# Patient Record
Sex: FEMALE | Race: WHITE | NOT HISPANIC OR LATINO | Employment: OTHER | URBAN - METROPOLITAN AREA
[De-identification: names, ages, dates, MRNs, and addresses within clinical notes are randomized per-mention and may not be internally consistent; named-entity substitution may affect disease eponyms.]

---

## 2017-01-23 ENCOUNTER — CONVERSION ENCOUNTER (OUTPATIENT)
Dept: FAMILY MEDICINE CLINIC | Facility: CLINIC | Age: 82
End: 2017-01-23

## 2017-01-23 ENCOUNTER — HOSPITAL ENCOUNTER (OUTPATIENT)
Dept: FAMILY MEDICINE CLINIC | Facility: CLINIC | Age: 82
Setting detail: SPECIMEN
Discharge: HOME | End: 2017-01-23
Attending: FAMILY MEDICINE | Admitting: FAMILY MEDICINE

## 2017-01-23 LAB
ALBUMIN SERPL-MCNC: 3.9 G/DL (ref 3.5–4.8)
ALBUMIN/GLOB SERPL: 1.3 {RATIO} (ref 1–1.7)
ALP SERPL-CCNC: 31 IU/L (ref 32–91)
ALT SERPL-CCNC: 13 IU/L (ref 14–54)
ANION GAP SERPL CALC-SCNC: 9.6 MMOL/L (ref 10–20)
AST SERPL-CCNC: 19 IU/L (ref 15–41)
BILIRUB SERPL-MCNC: 0.9 MG/DL (ref 0.3–1.2)
BUN SERPL-MCNC: 23 MG/DL (ref 8–20)
BUN/CREAT SERPL: 20.9 (ref 5.4–26.2)
CALCIUM SERPL-MCNC: 9.9 MG/DL (ref 8.9–10.3)
CHLORIDE SERPL-SCNC: 106 MMOL/L (ref 101–111)
CHOLEST SERPL-MCNC: 175 MG/DL
CHOLEST/HDLC SERPL: 3.3 {RATIO}
CONV CO2: 29 MMOL/L (ref 22–32)
CONV LDL CHOLESTEROL DIRECT: 96 MG/DL (ref 0–100)
CONV TOTAL PROTEIN: 6.9 G/DL (ref 6.1–7.9)
CREAT UR-MCNC: 1.1 MG/DL (ref 0.4–1)
GLOBULIN UR ELPH-MCNC: 3 G/DL (ref 2.5–3.8)
GLUCOSE SERPL-MCNC: 105 MG/DL (ref 65–99)
HDLC SERPL-MCNC: 53 MG/DL
LDLC/HDLC SERPL: 1.8 {RATIO}
LIPID INTERPRETATION: ABNORMAL
POTASSIUM SERPL-SCNC: 3.6 MMOL/L (ref 3.6–5.1)
SODIUM SERPL-SCNC: 141 MMOL/L (ref 136–144)
TRIGL SERPL-MCNC: 122 MG/DL
TSH SERPL-ACNC: 0.75 UIU/ML (ref 0.34–5.6)
VLDLC SERPL CALC-MCNC: 26 MG/DL

## 2017-07-21 ENCOUNTER — HOSPITAL ENCOUNTER (OUTPATIENT)
Dept: FAMILY MEDICINE CLINIC | Facility: CLINIC | Age: 82
Setting detail: SPECIMEN
Discharge: HOME | End: 2017-07-21
Attending: FAMILY MEDICINE | Admitting: FAMILY MEDICINE

## 2017-07-21 ENCOUNTER — CONVERSION ENCOUNTER (OUTPATIENT)
Dept: FAMILY MEDICINE CLINIC | Facility: CLINIC | Age: 82
End: 2017-07-21

## 2017-07-21 LAB
ALBUMIN SERPL-MCNC: 3.7 G/DL (ref 3.5–4.8)
ALBUMIN/GLOB SERPL: 1.2 {RATIO} (ref 1–1.7)
ALP SERPL-CCNC: 32 IU/L (ref 32–91)
ALT SERPL-CCNC: 13 IU/L (ref 14–54)
ANION GAP SERPL CALC-SCNC: 13.2 MMOL/L (ref 10–20)
AST SERPL-CCNC: 16 IU/L (ref 15–41)
BILIRUB SERPL-MCNC: 0.8 MG/DL (ref 0.3–1.2)
BUN SERPL-MCNC: 19 MG/DL (ref 8–20)
BUN/CREAT SERPL: 15.8 (ref 5.4–26.2)
CALCIUM SERPL-MCNC: 10.1 MG/DL (ref 8.9–10.3)
CHLORIDE SERPL-SCNC: 104 MMOL/L (ref 101–111)
CHOLEST SERPL-MCNC: 166 MG/DL
CHOLEST/HDLC SERPL: 3.1 {RATIO}
CONV CO2: 27 MMOL/L (ref 22–32)
CONV LDL CHOLESTEROL DIRECT: 93 MG/DL (ref 0–100)
CONV TOTAL PROTEIN: 6.9 G/DL (ref 6.1–7.9)
CREAT UR-MCNC: 1.2 MG/DL (ref 0.4–1)
GLOBULIN UR ELPH-MCNC: 3.2 G/DL (ref 2.5–3.8)
GLUCOSE SERPL-MCNC: 108 MG/DL (ref 65–99)
HDLC SERPL-MCNC: 54 MG/DL
LDLC/HDLC SERPL: 1.7 {RATIO}
LIPID INTERPRETATION: NORMAL
POTASSIUM SERPL-SCNC: 4.2 MMOL/L (ref 3.6–5.1)
SODIUM SERPL-SCNC: 140 MMOL/L (ref 136–144)
TRIGL SERPL-MCNC: 92 MG/DL
TSH SERPL-ACNC: 0.77 UIU/ML (ref 0.34–5.6)
VLDLC SERPL CALC-MCNC: 18.9 MG/DL

## 2017-08-11 ENCOUNTER — HOSPITAL ENCOUNTER (OUTPATIENT)
Dept: MAMMOGRAPHY | Facility: HOSPITAL | Age: 82
Discharge: HOME | End: 2017-08-11
Attending: FAMILY MEDICINE | Admitting: FAMILY MEDICINE

## 2017-08-17 ENCOUNTER — HOSPITAL ENCOUNTER (OUTPATIENT)
Dept: MAMMOGRAPHY | Facility: HOSPITAL | Age: 82
Discharge: HOME | End: 2017-08-17
Attending: FAMILY MEDICINE | Admitting: FAMILY MEDICINE

## 2017-08-21 ENCOUNTER — CONVERSION ENCOUNTER (OUTPATIENT)
Dept: FAMILY MEDICINE CLINIC | Facility: CLINIC | Age: 82
End: 2017-08-21

## 2017-08-29 ENCOUNTER — CONVERSION ENCOUNTER (OUTPATIENT)
Dept: FAMILY MEDICINE CLINIC | Facility: CLINIC | Age: 82
End: 2017-08-29

## 2017-09-13 ENCOUNTER — HOSPITAL ENCOUNTER (OUTPATIENT)
Dept: PREADMISSION TESTING | Facility: HOSPITAL | Age: 82
Discharge: HOME | End: 2017-09-13
Attending: SURGERY | Admitting: SURGERY

## 2017-10-03 ENCOUNTER — CONVERSION ENCOUNTER (OUTPATIENT)
Dept: FAMILY MEDICINE CLINIC | Facility: CLINIC | Age: 82
End: 2017-10-03

## 2017-10-12 ENCOUNTER — CONVERSION ENCOUNTER (OUTPATIENT)
Dept: FAMILY MEDICINE CLINIC | Facility: CLINIC | Age: 82
End: 2017-10-12

## 2017-10-19 ENCOUNTER — CONVERSION ENCOUNTER (OUTPATIENT)
Dept: FAMILY MEDICINE CLINIC | Facility: CLINIC | Age: 82
End: 2017-10-19

## 2017-10-26 ENCOUNTER — HOSPITAL ENCOUNTER (OUTPATIENT)
Dept: ONCOLOGY | Facility: CLINIC | Age: 82
Setting detail: INFUSION SERIES
Discharge: HOME | End: 2017-10-26
Attending: INTERNAL MEDICINE | Admitting: INTERNAL MEDICINE

## 2017-10-26 ENCOUNTER — HOSPITAL ENCOUNTER (OUTPATIENT)
Dept: ONCOLOGY | Facility: HOSPITAL | Age: 82
Discharge: HOME | End: 2017-10-26
Attending: INTERNAL MEDICINE | Admitting: INTERNAL MEDICINE

## 2017-10-26 ENCOUNTER — CLINICAL SUPPORT (OUTPATIENT)
Dept: ONCOLOGY | Facility: HOSPITAL | Age: 82
End: 2017-10-26

## 2017-10-26 NOTE — PROGRESS NOTES
PATIENTS ONCOLOGY RECORD LOCATED IN [facility]      Subjective     Name:  ABDON ROSS     Date:  10/26/2017  Address:  [address] [id number]  Home: [phone]  :  1935 AGE:  82 y.o.        RECORDS OBTAINED:  Patients Oncology Record is located in [facility]

## 2017-11-02 ENCOUNTER — CONVERSION ENCOUNTER (OUTPATIENT)
Dept: FAMILY MEDICINE CLINIC | Facility: CLINIC | Age: 82
End: 2017-11-02

## 2017-12-14 ENCOUNTER — CONVERSION ENCOUNTER (OUTPATIENT)
Dept: FAMILY MEDICINE CLINIC | Facility: CLINIC | Age: 82
End: 2017-12-14

## 2018-01-22 ENCOUNTER — HOSPITAL ENCOUNTER (OUTPATIENT)
Dept: FAMILY MEDICINE CLINIC | Facility: CLINIC | Age: 83
Setting detail: SPECIMEN
Discharge: HOME | End: 2018-01-22
Attending: FAMILY MEDICINE | Admitting: FAMILY MEDICINE

## 2018-01-22 ENCOUNTER — CONVERSION ENCOUNTER (OUTPATIENT)
Dept: FAMILY MEDICINE CLINIC | Facility: CLINIC | Age: 83
End: 2018-01-22

## 2018-01-22 LAB
ALBUMIN SERPL-MCNC: 3.7 G/DL (ref 3.5–4.8)
ALBUMIN/GLOB SERPL: 1.2 {RATIO} (ref 1–1.7)
ALP SERPL-CCNC: 33 IU/L (ref 32–91)
ALT SERPL-CCNC: 13 IU/L (ref 14–54)
ANION GAP SERPL CALC-SCNC: 12.1 MMOL/L (ref 10–20)
AST SERPL-CCNC: 19 IU/L (ref 15–41)
BILIRUB SERPL-MCNC: 0.7 MG/DL (ref 0.3–1.2)
BUN SERPL-MCNC: 20 MG/DL (ref 8–20)
BUN/CREAT SERPL: 18.2 (ref 5.4–26.2)
CALCIUM SERPL-MCNC: 10.2 MG/DL (ref 8.9–10.3)
CHLORIDE SERPL-SCNC: 102 MMOL/L (ref 101–111)
CHOLEST SERPL-MCNC: 176 MG/DL
CHOLEST/HDLC SERPL: 3.2 {RATIO}
CONV CO2: 28 MMOL/L (ref 22–32)
CONV LDL CHOLESTEROL DIRECT: 105 MG/DL (ref 0–100)
CONV TOTAL PROTEIN: 6.8 G/DL (ref 6.1–7.9)
CREAT UR-MCNC: 1.1 MG/DL (ref 0.4–1)
GLOBULIN UR ELPH-MCNC: 3.1 G/DL (ref 2.5–3.8)
GLUCOSE SERPL-MCNC: 98 MG/DL (ref 65–99)
HDLC SERPL-MCNC: 54 MG/DL
LDLC/HDLC SERPL: 1.9 {RATIO}
LIPID INTERPRETATION: ABNORMAL
POTASSIUM SERPL-SCNC: 4.1 MMOL/L (ref 3.6–5.1)
SODIUM SERPL-SCNC: 138 MMOL/L (ref 136–144)
TRIGL SERPL-MCNC: 86 MG/DL
VLDLC SERPL CALC-MCNC: 17 MG/DL

## 2018-04-26 ENCOUNTER — CLINICAL SUPPORT (OUTPATIENT)
Dept: ONCOLOGY | Facility: HOSPITAL | Age: 83
End: 2018-04-26

## 2018-04-26 ENCOUNTER — HOSPITAL ENCOUNTER (OUTPATIENT)
Dept: ONCOLOGY | Facility: CLINIC | Age: 83
Setting detail: INFUSION SERIES
Discharge: HOME | End: 2018-04-26
Attending: INTERNAL MEDICINE | Admitting: INTERNAL MEDICINE

## 2018-04-26 NOTE — PROGRESS NOTES
PATIENTS ONCOLOGY RECORD LOCATED IN [facility]      Subjective     Name:  ABDON ROSS     Date:  2018  Address:  [address] [id number]  Home: [phone]  :  1935 AGE:  82 y.o.        RECORDS OBTAINED:  Patients Oncology Record is located in [facility]

## 2018-07-20 ENCOUNTER — HOSPITAL ENCOUNTER (OUTPATIENT)
Dept: FAMILY MEDICINE CLINIC | Facility: CLINIC | Age: 83
Setting detail: SPECIMEN
Discharge: HOME | End: 2018-07-20
Attending: FAMILY MEDICINE | Admitting: FAMILY MEDICINE

## 2018-07-20 ENCOUNTER — CONVERSION ENCOUNTER (OUTPATIENT)
Dept: FAMILY MEDICINE CLINIC | Facility: CLINIC | Age: 83
End: 2018-07-20

## 2018-07-20 LAB
ALBUMIN SERPL-MCNC: 3.9 G/DL (ref 3.5–4.8)
ALBUMIN/GLOB SERPL: 1.3 {RATIO} (ref 1–1.7)
ALP SERPL-CCNC: 32 IU/L (ref 32–91)
ALT SERPL-CCNC: 14 IU/L (ref 14–54)
ANION GAP SERPL CALC-SCNC: 11.6 MMOL/L (ref 10–20)
AST SERPL-CCNC: 17 IU/L (ref 15–41)
BASOPHILS # BLD AUTO: 0 10*3/UL (ref 0–0.2)
BASOPHILS NFR BLD AUTO: 1 % (ref 0–2)
BILIRUB SERPL-MCNC: 0.7 MG/DL (ref 0.3–1.2)
BUN SERPL-MCNC: 15 MG/DL (ref 8–20)
BUN/CREAT SERPL: 13.6 (ref 5.4–26.2)
CALCIUM SERPL-MCNC: 10.3 MG/DL (ref 8.9–10.3)
CHLORIDE SERPL-SCNC: 102 MMOL/L (ref 101–111)
CHOLEST SERPL-MCNC: 160 MG/DL
CHOLEST/HDLC SERPL: 3.1 {RATIO}
CONV CO2: 28 MMOL/L (ref 22–32)
CONV LDL CHOLESTEROL DIRECT: 82 MG/DL (ref 0–100)
CONV TOTAL PROTEIN: 6.9 G/DL (ref 6.1–7.9)
CREAT UR-MCNC: 1.1 MG/DL (ref 0.4–1)
DIFFERENTIAL METHOD BLD: (no result)
EOSINOPHIL # BLD AUTO: 0.2 10*3/UL (ref 0–0.3)
EOSINOPHIL # BLD AUTO: 2 % (ref 0–3)
ERYTHROCYTE [DISTWIDTH] IN BLOOD BY AUTOMATED COUNT: 12.9 % (ref 11.5–14.5)
GLOBULIN UR ELPH-MCNC: 3 G/DL (ref 2.5–3.8)
GLUCOSE SERPL-MCNC: 85 MG/DL (ref 65–99)
HCT VFR BLD AUTO: 38.2 % (ref 35–49)
HDLC SERPL-MCNC: 52 MG/DL
HGB BLD-MCNC: 13.2 G/DL (ref 12–15)
LDLC/HDLC SERPL: 1.6 {RATIO}
LIPID INTERPRETATION: ABNORMAL
LYMPHOCYTES # BLD AUTO: 2.6 10*3/UL (ref 0.8–4.8)
LYMPHOCYTES NFR BLD AUTO: 37 % (ref 18–42)
MCH RBC QN AUTO: 32.3 PG (ref 26–32)
MCHC RBC AUTO-ENTMCNC: 34.5 G/DL (ref 32–36)
MCV RBC AUTO: 93.6 FL (ref 80–94)
MONOCYTES # BLD AUTO: 0.6 10*3/UL (ref 0.1–1.3)
MONOCYTES NFR BLD AUTO: 8 % (ref 2–11)
NEUTROPHILS # BLD AUTO: 3.8 10*3/UL (ref 2.3–8.6)
NEUTROPHILS NFR BLD AUTO: 52 % (ref 50–75)
NRBC BLD AUTO-RTO: 0 /100{WBCS}
NRBC/RBC NFR BLD MANUAL: 0 10*3/UL
PLATELET # BLD AUTO: 178 10*3/UL (ref 150–450)
PMV BLD AUTO: 12.1 FL (ref 7.4–10.4)
POTASSIUM SERPL-SCNC: 3.6 MMOL/L (ref 3.6–5.1)
RBC # BLD AUTO: 4.09 10*6/UL (ref 4–5.4)
SODIUM SERPL-SCNC: 138 MMOL/L (ref 136–144)
TRIGL SERPL-MCNC: 122 MG/DL
VLDLC SERPL CALC-MCNC: 26.6 MG/DL
WBC # BLD AUTO: 7.2 10*3/UL (ref 4.5–11.5)

## 2018-08-30 ENCOUNTER — CONVERSION ENCOUNTER (OUTPATIENT)
Dept: FAMILY MEDICINE CLINIC | Facility: CLINIC | Age: 83
End: 2018-08-30

## 2018-10-26 ENCOUNTER — CONVERSION ENCOUNTER (OUTPATIENT)
Dept: FAMILY MEDICINE CLINIC | Facility: CLINIC | Age: 83
End: 2018-10-26

## 2019-01-18 ENCOUNTER — HOSPITAL ENCOUNTER (OUTPATIENT)
Dept: FAMILY MEDICINE CLINIC | Facility: CLINIC | Age: 84
Setting detail: SPECIMEN
Discharge: HOME | End: 2019-01-18
Attending: FAMILY MEDICINE | Admitting: FAMILY MEDICINE

## 2019-01-18 ENCOUNTER — CONVERSION ENCOUNTER (OUTPATIENT)
Dept: FAMILY MEDICINE CLINIC | Facility: CLINIC | Age: 84
End: 2019-01-18

## 2019-01-18 LAB
ALBUMIN SERPL-MCNC: 3.5 G/DL (ref 3.5–4.8)
ALBUMIN/GLOB SERPL: 1.3 {RATIO} (ref 1–1.7)
ALP SERPL-CCNC: 30 IU/L (ref 32–91)
ALT SERPL-CCNC: 13 IU/L (ref 14–54)
ANION GAP SERPL CALC-SCNC: 12.4 MMOL/L (ref 10–20)
AST SERPL-CCNC: 16 IU/L (ref 15–41)
BASOPHILS # BLD AUTO: 0 10*3/UL (ref 0–0.2)
BASOPHILS NFR BLD AUTO: 0 % (ref 0–2)
BILIRUB SERPL-MCNC: 0.7 MG/DL (ref 0.3–1.2)
BUN SERPL-MCNC: 23 MG/DL (ref 8–20)
BUN/CREAT SERPL: 20.9 (ref 5.4–26.2)
CALCIUM SERPL-MCNC: 9.9 MG/DL (ref 8.9–10.3)
CHLORIDE SERPL-SCNC: 104 MMOL/L (ref 101–111)
CHOLEST SERPL-MCNC: 157 MG/DL
CHOLEST/HDLC SERPL: 2.9 {RATIO}
CONV CO2: 25 MMOL/L (ref 22–32)
CONV LDL CHOLESTEROL DIRECT: 83 MG/DL (ref 0–100)
CONV TOTAL PROTEIN: 6.3 G/DL (ref 6.1–7.9)
CREAT UR-MCNC: 1.1 MG/DL (ref 0.4–1)
DIFFERENTIAL METHOD BLD: (no result)
EOSINOPHIL # BLD AUTO: 0.1 10*3/UL (ref 0–0.3)
EOSINOPHIL # BLD AUTO: 2 % (ref 0–3)
ERYTHROCYTE [DISTWIDTH] IN BLOOD BY AUTOMATED COUNT: 13.2 % (ref 11.5–14.5)
GLOBULIN UR ELPH-MCNC: 2.8 G/DL (ref 2.5–3.8)
GLUCOSE SERPL-MCNC: 96 MG/DL (ref 65–99)
HCT VFR BLD AUTO: 36.5 % (ref 35–49)
HDLC SERPL-MCNC: 54 MG/DL
HGB BLD-MCNC: 12.2 G/DL (ref 12–15)
LDLC/HDLC SERPL: 1.5 {RATIO}
LIPID INTERPRETATION: NORMAL
LYMPHOCYTES # BLD AUTO: 2.6 10*3/UL (ref 0.8–4.8)
LYMPHOCYTES NFR BLD AUTO: 36 % (ref 18–42)
MCH RBC QN AUTO: 32.1 PG (ref 26–32)
MCHC RBC AUTO-ENTMCNC: 33.4 G/DL (ref 32–36)
MCV RBC AUTO: 96.1 FL (ref 80–94)
MONOCYTES # BLD AUTO: 0.6 10*3/UL (ref 0.1–1.3)
MONOCYTES NFR BLD AUTO: 8 % (ref 2–11)
NEUTROPHILS # BLD AUTO: 3.8 10*3/UL (ref 2.3–8.6)
NEUTROPHILS NFR BLD AUTO: 54 % (ref 50–75)
NRBC BLD AUTO-RTO: 0 /100{WBCS}
NRBC/RBC NFR BLD MANUAL: 0 10*3/UL
PLATELET # BLD AUTO: 173 10*3/UL (ref 150–450)
PMV BLD AUTO: 12.1 FL (ref 7.4–10.4)
POTASSIUM SERPL-SCNC: 3.4 MMOL/L (ref 3.6–5.1)
RBC # BLD AUTO: 3.8 10*6/UL (ref 4–5.4)
SODIUM SERPL-SCNC: 138 MMOL/L (ref 136–144)
TRIGL SERPL-MCNC: 88 MG/DL
VLDLC SERPL CALC-MCNC: 19.8 MG/DL
WBC # BLD AUTO: 7.1 10*3/UL (ref 4.5–11.5)

## 2019-03-12 ENCOUNTER — HOSPITAL ENCOUNTER (OUTPATIENT)
Dept: MAMMOGRAPHY | Facility: HOSPITAL | Age: 84
Discharge: HOME | End: 2019-03-12
Attending: FAMILY MEDICINE | Admitting: FAMILY MEDICINE

## 2019-05-03 ENCOUNTER — CONVERSION ENCOUNTER (OUTPATIENT)
Dept: FAMILY MEDICINE CLINIC | Facility: CLINIC | Age: 84
End: 2019-05-03

## 2019-06-04 VITALS
HEIGHT: 63 IN | BODY MASS INDEX: 31.08 KG/M2 | SYSTOLIC BLOOD PRESSURE: 135 MMHG | WEIGHT: 184 LBS | WEIGHT: 184 LBS | RESPIRATION RATE: 16 BRPM | BODY MASS INDEX: 31.25 KG/M2 | HEART RATE: 63 BPM | OXYGEN SATURATION: 98 % | DIASTOLIC BLOOD PRESSURE: 84 MMHG | SYSTOLIC BLOOD PRESSURE: 147 MMHG | WEIGHT: 176.4 LBS | HEART RATE: 55 BPM | WEIGHT: 178 LBS | DIASTOLIC BLOOD PRESSURE: 65 MMHG | HEIGHT: 63 IN | OXYGEN SATURATION: 96 % | HEIGHT: 63 IN | HEART RATE: 58 BPM | WEIGHT: 183 LBS | HEART RATE: 58 BPM | BODY MASS INDEX: 32.6 KG/M2 | RESPIRATION RATE: 20 BRPM | HEIGHT: 63 IN | BODY MASS INDEX: 31.71 KG/M2 | OXYGEN SATURATION: 98 % | OXYGEN SATURATION: 98 % | DIASTOLIC BLOOD PRESSURE: 77 MMHG | OXYGEN SATURATION: 99 % | RESPIRATION RATE: 20 BRPM | BODY MASS INDEX: 31.54 KG/M2 | WEIGHT: 176 LBS | BODY MASS INDEX: 31.54 KG/M2 | HEART RATE: 64 BPM | OXYGEN SATURATION: 95 % | OXYGEN SATURATION: 97 % | BODY MASS INDEX: 32.16 KG/M2 | DIASTOLIC BLOOD PRESSURE: 80 MMHG | WEIGHT: 184 LBS | OXYGEN SATURATION: 97 % | SYSTOLIC BLOOD PRESSURE: 133 MMHG | HEART RATE: 56 BPM | WEIGHT: 175.8 LBS | BODY MASS INDEX: 31.15 KG/M2 | HEIGHT: 63 IN | SYSTOLIC BLOOD PRESSURE: 151 MMHG | WEIGHT: 184 LBS | HEIGHT: 63 IN | DIASTOLIC BLOOD PRESSURE: 85 MMHG | OXYGEN SATURATION: 97 % | SYSTOLIC BLOOD PRESSURE: 127 MMHG | HEIGHT: 63 IN | SYSTOLIC BLOOD PRESSURE: 135 MMHG | SYSTOLIC BLOOD PRESSURE: 119 MMHG | HEART RATE: 52 BPM | SYSTOLIC BLOOD PRESSURE: 131 MMHG | BODY MASS INDEX: 31.43 KG/M2 | HEART RATE: 60 BPM | DIASTOLIC BLOOD PRESSURE: 76 MMHG | SYSTOLIC BLOOD PRESSURE: 131 MMHG | OXYGEN SATURATION: 96 % | HEIGHT: 63 IN | WEIGHT: 175.4 LBS | OXYGEN SATURATION: 95 % | HEART RATE: 58 BPM | WEIGHT: 179 LBS | HEIGHT: 63 IN | SYSTOLIC BLOOD PRESSURE: 130 MMHG | DIASTOLIC BLOOD PRESSURE: 74 MMHG | WEIGHT: 177.4 LBS | HEART RATE: 72 BPM | WEIGHT: 178 LBS | BODY MASS INDEX: 31.18 KG/M2 | DIASTOLIC BLOOD PRESSURE: 79 MMHG | DIASTOLIC BLOOD PRESSURE: 77 MMHG | RESPIRATION RATE: 20 BRPM | SYSTOLIC BLOOD PRESSURE: 110 MMHG | HEART RATE: 54 BPM | HEIGHT: 63 IN | DIASTOLIC BLOOD PRESSURE: 80 MMHG | RESPIRATION RATE: 14 BRPM | WEIGHT: 181.5 LBS | DIASTOLIC BLOOD PRESSURE: 72 MMHG | SYSTOLIC BLOOD PRESSURE: 166 MMHG | HEART RATE: 64 BPM | DIASTOLIC BLOOD PRESSURE: 78 MMHG | SYSTOLIC BLOOD PRESSURE: 126 MMHG | DIASTOLIC BLOOD PRESSURE: 67 MMHG | HEART RATE: 61 BPM | OXYGEN SATURATION: 98 % | OXYGEN SATURATION: 100 %

## 2019-06-04 VITALS
SYSTOLIC BLOOD PRESSURE: 172 MMHG | HEART RATE: 62 BPM | WEIGHT: 176 LBS | DIASTOLIC BLOOD PRESSURE: 85 MMHG | BODY MASS INDEX: 31.18 KG/M2 | HEIGHT: 63 IN | OXYGEN SATURATION: 96 %

## 2019-06-06 NOTE — PROGRESS NOTES
Visit Type:  Follow-up Visit  Referring Provider:  Matt Arana MD  Primary Provider:  Sumit Gutierrez MD    CC:  f/u from [facility] and dizziness.    History of Present Illness:         This is an 83 years old female who presents with ER f/u on dizziness.  The patient seen at [facility] ER 1 week ago with dizziness and  spinning sensation, but has no history of fullness in ear, imbalance and fainting spells.  The episodes of dizziness occur   isolated event.  When the episodes occur, they last for a few minutes.  The episodes are brought on or made worse with standing up and looking up.  nausea, vomiting, unsteady gate, falls, headaches and vision changes.        Past Medical History:     Reviewed history from 2015 and no changes required:        hemorrhoids        varicose veins        sinus        breast lumps        Hyperlipidemia        Hypertension        Hypothyroidism        Measles        Mumps        Chickenpox        Whooping Cough    Family History Summary:      Reviewed history Last on 2019 and no changes required:2019  Other Family Member - Has FH Hypertension - Entered On: 2016  Other Family Member - Has FH Heart Disease - Entered On: 2016    General Comments - FH:  father AMI @ 67 years old  mother 92   brother ruptured anuerism  FH for AAA  Family History of Hypertension  Family History of Diabetes      Social History:     Reviewed history from 2017 and no changes required:        Patient has never smoked.        Passive Smoke: N        Alcohol Use: N        Drug Use: N        HIV/High Risk: N        Regular Exercise: N                Risk Factors:     Smoked Tobacco Use:  Never smoker      Review of Systems     General       Complains of fatigue and malaise.    CV       Complains of lightheadedness.       Denies near fainting, shortness of breath with exertion and palpitations.    Resp       Denies shortness of breath.    GI       Denies nausea, vomiting and  abdominal pain.    Neuro       Complains of sensation of room spinning and dizziness.       Denies poor balance, falling down, visual disturbances and seizures.    Psych       Complains of anxiety.      Vital Signs:    Patient Profile:    83 Years Old Female  Height:     63 inches  Weight:     176 pounds  BMI:        31.17     O2 Sat:     96 %  Temp:       97.2 degrees F oral  Pulse rate: 62 / minute  BP Sittin / 85  (left arm)    Cuff size:  regular      Problems: Active problems were reviewed with the patient during this visit.  Medications: Medications were reviewed with the patient during this visit.  Allergies: Allergies were reviewed with the patient during this visit.        Vitals Entered By: Deborah TREVIZO (May  3, 2019 12:46 PM)      Physical Exam    General:      well developed, well nourished, in no acute distress.    Eyes:      PERRL/EOM intact, conjunctiva and sclera clear with out nystagmus.    Ears:      TM's intact and clear with normal canals with grossly normal hearing.    Lungs:      clear bilaterally to auscultation.    Heart:      non-displaced PMI, chest non-tender; regular rate and rhythm, S1, S2 without murmurs, rubs, or gallops  Abdomen:      non-tender.    Neurologic:      no focal deficits, cranial nerves II-XII grossly intact with normal sensation, reflexes, coordination, muscle strength and tone.    Psych:      alert and cooperative; normal mood and affect; normal attention span and concentration.        Blood Pressure:  Today's BP: 172/85 mm Hg    Labwork:   Most Recent Lab Results:   LDL: 83 mg/dL 2019    Active Medications (reviewed today):  BENZONATATE 200 MG ORAL CAPSULE (BENZONATATE) Take 1 capsule three times daily as needed for cough  ZITHROMAX 250 MG ORAL TABLET (AZITHROMYCIN) Take 2 tablets by mouth for 1 day, then Take 1 tablet by mouth daily for 4 days  TYLENOL CAPSULE (ACETAMINOPHEN CAPS)   FISH OIL CAPSULE (OMEGA-3 FATTY ACIDS CAPS)   GLUCOSAMINE CAPSULE  (GLUCOSAMINE SULFATE CAPS)   POTASSIUM CHLORIDE ER 20 MEQ TABLET EXTENDED RELEASE (POTASSIUM CHLORIDE DEONDRE ER) TAKE 1 TABLET EVERY DAY  FOLIC ACID 400 MCG ORAL TABLET (FOLIC ACID) daily  PRAVASTATIN SODIUM 40 MG ORAL TABLET (PRAVASTATIN SODIUM) TAKE 1 TABLET EVERY DAY  HYDROCHLOROTHIAZIDE 25 MG TABLET (HYDROCHLOROTHIAZIDE) TAKE 1 TABLET EVERY DAY  LOSARTAN POTASSIUM 50 MG ORAL TABLET (LOSARTAN POTASSIUM) TAKE 1 TABLET EVERY DAY  SYNTHROID 100 MCG TABLET (LEVOTHYROXINE SODIUM) TAKE 1 TABLET EVERY DAY    Current Allergies (reviewed today):  CODEINE SULFATE (Critical)        Impression & Recommendations:    Problem # 1:  Dizziness (ICD-780.4) ([id number])  Assessment: New  ER reord reveiwed, Sx improving. Demonstrated maneuvers to self-treat vertigo.   Patient to call to be seen if no improvement in 10-14 days, sooner if worse.     Medications Added to Medication List This Visit:  1)  Losartan Potassium-hctz 100-12.5 Mg Oral Tablet (Losartan potassium-hctz) .... Take one (1) tablet by mouth daily.        Patient Instructions:  1)  Please schedule a follow-up appointment prn.          Medications:  LOSARTAN POTASSIUM-HCTZ 100-12.5 MG ORAL TABLET (LOSARTAN POTASSIUM-HCTZ) Take one (1) tablet by mouth daily.  #30[Tablet] x 3      Entered and Authorized by:  Matt Arana MD      Electronically signed by:   Matt Arana MD on 05/03/2019      Method used:    Electronically to               [id number]* (retail)              [address] [place] [address]              Ph: [phone]              Fax: [phone]      Note to Pharmacy: Route: ORAL;please note change in Losartan and HCTZ  dose                           RxID:   [id number]                Medication Administration    Orders Added:  1)  Ofc Vst, Est Level III [67633]  ]      Electronically signed by Matt Arana MD on 05/27/2019 at 12:29  PM  ________________________________________________________________________       Disclaimer: Converted Note message may not contain all data elements that existed in the legacy source system. Please see [facility] for the original note details.

## 2019-06-14 ENCOUNTER — CONVERSION ENCOUNTER (OUTPATIENT)
Dept: FAMILY MEDICINE CLINIC | Facility: CLINIC | Age: 84
End: 2019-06-14

## 2019-06-17 ENCOUNTER — TELEPHONE (OUTPATIENT)
Dept: FAMILY MEDICINE CLINIC | Facility: CLINIC | Age: 84
End: 2019-06-17

## 2019-06-26 VITALS
BODY MASS INDEX: 31.18 KG/M2 | HEART RATE: 61 BPM | HEIGHT: 63 IN | OXYGEN SATURATION: 98 % | WEIGHT: 176 LBS | DIASTOLIC BLOOD PRESSURE: 84 MMHG | SYSTOLIC BLOOD PRESSURE: 145 MMHG

## 2019-06-27 NOTE — PROGRESS NOTES
Visit Type:  Follow-up Visit  Referring Provider:  Matt Arana MD  Primary Provider:  Sumit Gutierrez MD    CC:  f/u on dizziness and HTN.    History of Present Illness:         This is an 83 years old female who presents with hypertension and hypothyroidism f/u.  The patient complains of fatigue, but denies headache, visual disturbance, dizziness, syncope, palpitations, abdominal pain and chest pain.  The patient understands   dietary restrictions and is compliant with medications.        Past Medical History:     Reviewed history from 2015 and no changes required:        hemorrhoids        varicose veins        sinus        breast lumps        Hyperlipidemia        Hypertension        Hypothyroidism        Measles        Mumps        Chickenpox        Whooping Cough        Social History:     Reviewed history from 2017 and no changes required:        Patient has never smoked.        Passive Smoke: N        Alcohol Use: N        Drug Use: N        HIV/High Risk: N        Regular Exercise: N                Risk Factors:     Smoked Tobacco Use:  Never smoker      Review of Systems     General       Complains of fatigue.       Denies sleep disorder.    CV       Denies chest pain or discomfort.    Resp       Denies shortness of breath and wheezing.    GI       Denies abdominal pain.    Neuro       Denies headaches and dizziness.      Vital Signs:    Patient Profile:    83 Years Old Female  Height:     63 inches  Weight:     176 pounds  BMI:        31.17     O2 Sat:     98 %  Temp:       97.1 degrees F oral  Pulse rate: 61 / minute  BP Sittin / 84  (left arm)    Cuff size:  regular      Problems: Active problems were reviewed with the patient during this visit.  Medications: Medications were reviewed with the patient during this visit.  Allergies: Allergies were reviewed with the patient during this visit.        Vitals Entered By: Deborah TREVIZO (2019 12:30 PM)      Physical  Exam    General:      well developed, well nourished, in no acute distress.    Lungs:      clear bilaterally to auscultation.    Heart:      non-displaced PMI, chest non-tender; regular rate and rhythm, S1, S2 without murmurs, rubs, or gallops  Abdomen:      non-tender.    Extremities:      no clubbing, cyanosis, edema, or deformity noted with normal full range of motion of joints of all four extremities   Neurologic:      no focal deficits, cranial nerves II-XII grossly intact with normal sensation, reflexes, coordination, muscle strength and tone.        Blood Pressure:  Today's BP: 145/84 mm Hg    Labwork:   Most Recent Lab Results:   LDL: 83 mg/dL 01/18/2019    Active Medications (reviewed today):  TYLENOL CAPSULE (ACETAMINOPHEN CAPS)   FISH OIL CAPSULE (OMEGA-3 FATTY ACIDS CAPS)   GLUCOSAMINE CAPSULE (GLUCOSAMINE SULFATE CAPS)   POTASSIUM CHLORIDE ER 20 MEQ TABLET EXTENDED RELEASE (POTASSIUM CHLORIDE DEONDRE ER) TAKE 1 TABLET EVERY DAY  FOLIC ACID 400 MCG ORAL TABLET (FOLIC ACID) daily  PRAVASTATIN SODIUM 40 MG ORAL TABLET (PRAVASTATIN SODIUM) TAKE 1 TABLET EVERY DAY  LOSARTAN/HCTZ 100/12.5MG TABLETS (LOSARTAN POTASSIUM-HCTZ) TAKE 1 TABLET BY MOUTH EVERY DAY  SYNTHROID 100 MCG TABLET (LEVOTHYROXINE SODIUM) TAKE 1 TABLET EVERY DAY    Current Allergies (reviewed today):  CODEINE SULFATE (Critical)        Impression & Recommendations:    Problem # 1:  Hypothyroidism (ICD-244.9) (BZF55-L84.9)  Assessment: Improved    Her updated medication list for this problem includes:     Synthroid 100 Mcg Tablet (Levothyroxine sodium) ..... Take 1 tablet every day    Labs Reviewed:  TSH: 0.61 (01/18/2019)     Chol: 157 (01/18/2019)   HDL: 54 (01/18/2019)   LDL: 83 (01/18/2019)       Orders:  [facility] THYROID STIMULATING HORMONE (TSH) (TSH)      Problem # 2:  Hypertension (ICD-401.9) (HTP83-L77)  Assessment: Improved    Her updated medication list for this problem includes:     Losartan/hctz 100/12.5mg Tablets (Losartan potassium-hctz)  ..... Take 1 tablet by mouth every day    BP today: 145/84  Prior BP: 172/85 (05/03/2019)    Labs Reviewed:  Creat: 1.1 (01/18/2019)  Chol: 157 (01/18/2019)   HDL: 54 (01/18/2019)   LDL: 83 (01/18/2019)       Orders:  [facility] BASIC METABOLIC PANEL (BMP) (MPB)          Patient Instructions:  1)  Please schedule a follow-up appointment in 6 months.                      Medication Administration    Orders Added:  1)  [facility] THYROID STIMULATING HORMONE (TSH) [TSH]  2)  [facility] BASIC METABOLIC PANEL (BMP) [MPB]  3)  Ofc Vst, Est Level III [76164]  ]      Electronically signed by Matt Arana MD on 06/26/2019 at 9:54 PM  ________________________________________________________________________       Disclaimer: Converted Note message may not contain all data elements that existed in the legacy source system. Please see [facility] for the original note details.

## 2019-07-16 RX ORDER — PRAVASTATIN SODIUM 40 MG
TABLET ORAL
Qty: 90 TABLET | Refills: 3 | Status: SHIPPED | OUTPATIENT
Start: 2019-07-16 | End: 2020-06-05

## 2019-08-29 RX ORDER — LOSARTAN POTASSIUM AND HYDROCHLOROTHIAZIDE 12.5; 1 MG/1; MG/1
TABLET ORAL
Qty: 90 TABLET | Refills: 0 | Status: SHIPPED | OUTPATIENT
Start: 2019-08-29 | End: 2019-12-04 | Stop reason: SDUPTHER

## 2019-09-03 RX ORDER — POTASSIUM CHLORIDE 20 MEQ/1
TABLET, EXTENDED RELEASE ORAL
Qty: 90 TABLET | Refills: 3 | Status: SHIPPED | OUTPATIENT
Start: 2019-09-03 | End: 2020-06-14

## 2019-09-03 RX ORDER — LEVOTHYROXINE SODIUM 100 MCG
TABLET ORAL
Qty: 90 TABLET | Refills: 3 | Status: SHIPPED | OUTPATIENT
Start: 2019-09-03 | End: 2020-06-14

## 2019-12-05 RX ORDER — LOSARTAN POTASSIUM AND HYDROCHLOROTHIAZIDE 12.5; 1 MG/1; MG/1
TABLET ORAL
Qty: 90 TABLET | Refills: 3 | Status: SHIPPED | OUTPATIENT
Start: 2019-12-05 | End: 2020-08-10 | Stop reason: SDUPTHER

## 2020-01-23 ENCOUNTER — LAB (OUTPATIENT)
Dept: FAMILY MEDICINE CLINIC | Facility: CLINIC | Age: 85
End: 2020-01-23

## 2020-01-23 ENCOUNTER — OFFICE VISIT (OUTPATIENT)
Dept: FAMILY MEDICINE CLINIC | Facility: CLINIC | Age: 85
End: 2020-01-23

## 2020-01-23 VITALS
BODY MASS INDEX: 28.66 KG/M2 | DIASTOLIC BLOOD PRESSURE: 78 MMHG | WEIGHT: 172 LBS | HEIGHT: 65 IN | TEMPERATURE: 96.9 F | SYSTOLIC BLOOD PRESSURE: 122 MMHG | OXYGEN SATURATION: 98 % | HEART RATE: 54 BPM

## 2020-01-23 DIAGNOSIS — I10 ESSENTIAL HYPERTENSION: ICD-10-CM

## 2020-01-23 DIAGNOSIS — E03.9 HYPOTHYROIDISM, UNSPECIFIED TYPE: Primary | ICD-10-CM

## 2020-01-23 DIAGNOSIS — E78.2 MIXED HYPERLIPIDEMIA: ICD-10-CM

## 2020-01-23 DIAGNOSIS — E03.9 HYPOTHYROIDISM, UNSPECIFIED TYPE: ICD-10-CM

## 2020-01-23 PROBLEM — E78.5 HYPERLIPIDEMIA: Status: ACTIVE | Noted: 2020-01-23

## 2020-01-23 LAB
ALBUMIN SERPL-MCNC: 4.1 G/DL (ref 3.5–5.2)
ALBUMIN/GLOB SERPL: 1.3 G/DL
ALP SERPL-CCNC: 41 U/L (ref 39–117)
ALT SERPL W P-5'-P-CCNC: 9 U/L (ref 1–33)
ANION GAP SERPL CALCULATED.3IONS-SCNC: 13.2 MMOL/L (ref 5–15)
AST SERPL-CCNC: 12 U/L (ref 1–32)
BILIRUB SERPL-MCNC: 0.7 MG/DL (ref 0.2–1.2)
BUN BLD-MCNC: 19 MG/DL (ref 8–23)
BUN/CREAT SERPL: 16.5 (ref 7–25)
CALCIUM SPEC-SCNC: 9.9 MG/DL (ref 8.6–10.5)
CHLORIDE SERPL-SCNC: 105 MMOL/L (ref 98–107)
CHOLEST SERPL-MCNC: 147 MG/DL (ref 0–200)
CO2 SERPL-SCNC: 23.8 MMOL/L (ref 22–29)
CREAT BLD-MCNC: 1.15 MG/DL (ref 0.57–1)
DEPRECATED RDW RBC AUTO: 41.2 FL (ref 37–54)
ERYTHROCYTE [DISTWIDTH] IN BLOOD BY AUTOMATED COUNT: 11.9 % (ref 12.3–15.4)
GFR SERPL CREATININE-BSD FRML MDRD: 45 ML/MIN/1.73
GLOBULIN UR ELPH-MCNC: 3.1 GM/DL
GLUCOSE BLD-MCNC: 92 MG/DL (ref 65–99)
HCT VFR BLD AUTO: 38.9 % (ref 34–46.6)
HDLC SERPL-MCNC: 43 MG/DL (ref 40–60)
HGB BLD-MCNC: 13 G/DL (ref 12–15.9)
LDLC SERPL CALC-MCNC: 82 MG/DL (ref 0–100)
LDLC/HDLC SERPL: 1.91 {RATIO}
LYMPHOCYTES # BLD MANUAL: 1.69 10*3/MM3 (ref 0.7–3.1)
LYMPHOCYTES NFR BLD MANUAL: 42.3 % (ref 19.6–45.3)
LYMPHOCYTES NFR BLD MANUAL: 8.2 % (ref 5–12)
MCH RBC QN AUTO: 31.3 PG (ref 26.6–33)
MCHC RBC AUTO-ENTMCNC: 33.4 G/DL (ref 31.5–35.7)
MCV RBC AUTO: 93.5 FL (ref 79–97)
MONOCYTES # BLD AUTO: 0.33 10*3/MM3 (ref 0.1–0.9)
NEUTROPHILS # BLD AUTO: 1.98 10*3/MM3 (ref 1.7–7)
NEUTROPHILS NFR BLD MANUAL: 49.5 % (ref 42.7–76)
PLAT MORPH BLD: NORMAL
PLATELET # BLD AUTO: 149 10*3/MM3 (ref 140–450)
PMV BLD AUTO: 14.4 FL (ref 6–12)
POTASSIUM BLD-SCNC: 3.9 MMOL/L (ref 3.5–5.2)
PROT SERPL-MCNC: 7.2 G/DL (ref 6–8.5)
RBC # BLD AUTO: 4.16 10*6/MM3 (ref 3.77–5.28)
RBC MORPH BLD: NORMAL
SMUDGE CELLS BLD QL SMEAR: NORMAL
SODIUM BLD-SCNC: 142 MMOL/L (ref 136–145)
TRIGL SERPL-MCNC: 109 MG/DL (ref 0–150)
TSH SERPL DL<=0.05 MIU/L-ACNC: 0.66 UIU/ML (ref 0.27–4.2)
VLDLC SERPL-MCNC: 21.8 MG/DL (ref 5–40)
WBC NRBC COR # BLD: 3.99 10*3/MM3 (ref 3.4–10.8)

## 2020-01-23 PROCEDURE — 99214 OFFICE O/P EST MOD 30 MIN: CPT | Performed by: FAMILY MEDICINE

## 2020-01-23 PROCEDURE — 84443 ASSAY THYROID STIM HORMONE: CPT | Performed by: FAMILY MEDICINE

## 2020-01-23 PROCEDURE — 36415 COLL VENOUS BLD VENIPUNCTURE: CPT

## 2020-01-23 PROCEDURE — 80053 COMPREHEN METABOLIC PANEL: CPT | Performed by: FAMILY MEDICINE

## 2020-01-23 PROCEDURE — 85025 COMPLETE CBC W/AUTO DIFF WBC: CPT | Performed by: FAMILY MEDICINE

## 2020-01-23 PROCEDURE — 80061 LIPID PANEL: CPT | Performed by: FAMILY MEDICINE

## 2020-01-23 PROCEDURE — 85007 BL SMEAR W/DIFF WBC COUNT: CPT

## 2020-02-05 NOTE — ASSESSMENT & PLAN NOTE
Symptoms are improving - continue current treatment regimen.  We will check lab today.   Follow-up in 6 months.

## 2020-06-05 RX ORDER — PRAVASTATIN SODIUM 40 MG
TABLET ORAL
Qty: 90 TABLET | Refills: 3 | Status: SHIPPED | OUTPATIENT
Start: 2020-06-05 | End: 2021-03-18

## 2020-06-14 RX ORDER — POTASSIUM CHLORIDE 20 MEQ/1
TABLET, EXTENDED RELEASE ORAL
Qty: 90 TABLET | Refills: 3 | Status: SHIPPED | OUTPATIENT
Start: 2020-06-14 | End: 2021-04-09

## 2020-06-14 RX ORDER — LEVOTHYROXINE SODIUM 100 MCG
TABLET ORAL
Qty: 90 TABLET | Refills: 3 | Status: SHIPPED | OUTPATIENT
Start: 2020-06-14 | End: 2021-04-09

## 2020-08-10 ENCOUNTER — LAB (OUTPATIENT)
Dept: FAMILY MEDICINE CLINIC | Facility: CLINIC | Age: 85
End: 2020-08-10

## 2020-08-10 ENCOUNTER — OFFICE VISIT (OUTPATIENT)
Dept: FAMILY MEDICINE CLINIC | Facility: CLINIC | Age: 85
End: 2020-08-10

## 2020-08-10 VITALS
SYSTOLIC BLOOD PRESSURE: 138 MMHG | BODY MASS INDEX: 28.16 KG/M2 | TEMPERATURE: 98.2 F | HEIGHT: 65 IN | WEIGHT: 169 LBS | OXYGEN SATURATION: 94 % | HEART RATE: 57 BPM | DIASTOLIC BLOOD PRESSURE: 84 MMHG

## 2020-08-10 DIAGNOSIS — E03.9 HYPOTHYROIDISM, UNSPECIFIED TYPE: Primary | ICD-10-CM

## 2020-08-10 DIAGNOSIS — I10 ESSENTIAL HYPERTENSION: ICD-10-CM

## 2020-08-10 DIAGNOSIS — E78.2 MIXED HYPERLIPIDEMIA: ICD-10-CM

## 2020-08-10 LAB
ALBUMIN SERPL-MCNC: 4.2 G/DL (ref 3.5–5.2)
ALBUMIN/GLOB SERPL: 1.5 G/DL
ALP SERPL-CCNC: 41 U/L (ref 39–117)
ALT SERPL W P-5'-P-CCNC: 14 U/L (ref 1–33)
ANION GAP SERPL CALCULATED.3IONS-SCNC: 9.7 MMOL/L (ref 5–15)
AST SERPL-CCNC: 16 U/L (ref 1–32)
BILIRUB SERPL-MCNC: 0.6 MG/DL (ref 0–1.2)
BUN SERPL-MCNC: 14 MG/DL (ref 8–23)
BUN/CREAT SERPL: 12.7 (ref 7–25)
CALCIUM SPEC-SCNC: 10.5 MG/DL (ref 8.6–10.5)
CHLORIDE SERPL-SCNC: 102 MMOL/L (ref 98–107)
CHOLEST SERPL-MCNC: 172 MG/DL (ref 0–200)
CO2 SERPL-SCNC: 27.3 MMOL/L (ref 22–29)
CREAT SERPL-MCNC: 1.1 MG/DL (ref 0.57–1)
GFR SERPL CREATININE-BSD FRML MDRD: 47 ML/MIN/1.73
GLOBULIN UR ELPH-MCNC: 2.8 GM/DL
GLUCOSE SERPL-MCNC: 99 MG/DL (ref 65–99)
HDLC SERPL-MCNC: 50 MG/DL (ref 40–60)
LDLC SERPL CALC-MCNC: 102 MG/DL (ref 0–100)
LDLC/HDLC SERPL: 2.04 {RATIO}
POTASSIUM SERPL-SCNC: 4.4 MMOL/L (ref 3.5–5.2)
PROT SERPL-MCNC: 7 G/DL (ref 6–8.5)
SODIUM SERPL-SCNC: 139 MMOL/L (ref 136–145)
TRIGL SERPL-MCNC: 101 MG/DL (ref 0–150)
TSH SERPL DL<=0.05 MIU/L-ACNC: 1.12 UIU/ML (ref 0.27–4.2)
VLDLC SERPL-MCNC: 20.2 MG/DL (ref 5–40)

## 2020-08-10 PROCEDURE — 84443 ASSAY THYROID STIM HORMONE: CPT | Performed by: FAMILY MEDICINE

## 2020-08-10 PROCEDURE — 99214 OFFICE O/P EST MOD 30 MIN: CPT | Performed by: FAMILY MEDICINE

## 2020-08-10 PROCEDURE — 80053 COMPREHEN METABOLIC PANEL: CPT | Performed by: FAMILY MEDICINE

## 2020-08-10 PROCEDURE — 36415 COLL VENOUS BLD VENIPUNCTURE: CPT | Performed by: FAMILY MEDICINE

## 2020-08-10 PROCEDURE — 80061 LIPID PANEL: CPT | Performed by: FAMILY MEDICINE

## 2020-08-10 RX ORDER — LOSARTAN POTASSIUM AND HYDROCHLOROTHIAZIDE 12.5; 1 MG/1; MG/1
1 TABLET ORAL DAILY
Qty: 90 TABLET | Refills: 3 | Status: SHIPPED | OUTPATIENT
Start: 2020-08-10 | End: 2021-04-26 | Stop reason: HOSPADM

## 2020-08-10 NOTE — ASSESSMENT & PLAN NOTE
Hypertension is improving continue losartan- HCTZ.  Advised to follow low-salt diet and exercise.  We will check fasting lipid panel and CMP.

## 2020-08-10 NOTE — PROGRESS NOTES
Subjective   Aparna Matson is a 85 y.o. female.     History of Present Illness     Hypothyroidism  The patient  presents with 6 months f/u on  hypothyroidism.  She complains of fatigue but denies chest pain, palpitations, shortness of breath, trouble swallowing, anxiety, nervousness, dry skin and hair loss.  Since the last visit, the patient states   she is taking medication as directed   HTN  The patient present for six-month follow-up on hypertension and hyperlipidemia. The problem has been gradually improving since onset. The problem is controlled. Pertinent negatives include no anxiety, blurred vision, chest pain, palpitations, peripheral edema or shortness of breath.  Current antihypertension treatment includes ACE inhibitors, diuretics and lifestyle changes.     The following portions of the patient's history were reviewed and updated as appropriate: past medical history, past social history, past surgical history and problem list.    Review of Systems   Constitutional: Positive for fatigue. Negative for fever.   HENT: Negative for ear pain, sinus pressure, sore throat and trouble swallowing.    Eyes: Negative for blurred vision.   Respiratory: Negative for cough, shortness of breath and wheezing.    Cardiovascular: Negative for chest pain and palpitations.   Gastrointestinal: Negative for abdominal pain, vomiting and indigestion.   Endocrine: Negative for cold intolerance, polyphagia and polyuria.   Genitourinary: Negative for difficulty urinating.   Skin: Negative for rash.   Neurological: Negative for dizziness, weakness and headache.   Psychiatric/Behavioral: Negative for sleep disturbance and depressed mood. The patient is not nervous/anxious.        Objective   Physical Exam   Constitutional: She is oriented to person, place, and time. She appears well-developed.   Eyes: Pupils are equal, round, and reactive to light. EOM are normal.   Neck: Normal range of motion. Neck supple. No thyromegaly present.    Cardiovascular: Normal rate, regular rhythm, normal heart sounds and intact distal pulses.   Pulmonary/Chest: Effort normal and breath sounds normal. She has no wheezes.   Abdominal: Soft. Bowel sounds are normal. There is no tenderness.   Neurological: She is alert and oriented to person, place, and time.   Psychiatric: She has a normal mood and affect.   Vitals reviewed.    Vitals:    08/10/20 1121   BP: 138/84   Pulse: 57   Temp: 98.2 °F (36.8 °C)   SpO2: 94%     Current Outpatient Medications on File Prior to Visit   Medication Sig Dispense Refill   • Acetaminophen 500 MG coapsule Take 1 capsule by mouth As Needed.     • Glucosamine HCl (GLUCOSAMINE PO) Take 1 capsule by mouth Daily.     • Omega-3 Fatty Acids (FISH OIL PO) Take 1 capsule by mouth Daily.     • potassium chloride (K-DUR,KLOR-CON) 20 MEQ CR tablet TAKE 1 TABLET EVERY DAY 90 tablet 3   • pravastatin (PRAVACHOL) 40 MG tablet TAKE 1 TABLET EVERY DAY 90 tablet 3   • SYNTHROID 100 MCG tablet TAKE 1 TABLET EVERY DAY 90 tablet 3   • [DISCONTINUED] losartan-hydrochlorothiazide (HYZAAR) 100-12.5 MG per tablet TAKE 1 TABLET BY MOUTH EVERY DAY 90 tablet 3     No current facility-administered medications on file prior to visit.            Assessment/Plan   Problems Addressed this Visit        Cardiovascular and Mediastinum    Hypertension     Hypertension is improving continue losartan- HCTZ.  Advised to follow low-salt diet and exercise.  We will check fasting lipid panel and CMP.         Relevant Medications    losartan-hydrochlorothiazide (HYZAAR) 100-12.5 MG per tablet    Other Relevant Orders    Lipid panel    Comprehensive metabolic panel    Hyperlipidemia     Continue Pravastatin advised to follow low-fat diet and exercise.         Relevant Orders    Lipid panel    Comprehensive metabolic panel    TSH       Endocrine    Hypothyroidism - Primary     We will check TSH level continue current dose of Synthyroid.         Relevant Orders    Lipid panel     Comprehensive metabolic panel    TSH

## 2021-03-18 RX ORDER — PRAVASTATIN SODIUM 40 MG
TABLET ORAL
Qty: 90 TABLET | Refills: 3 | Status: SHIPPED | OUTPATIENT
Start: 2021-03-18 | End: 2021-12-22

## 2021-04-09 RX ORDER — LEVOTHYROXINE SODIUM 100 MCG
TABLET ORAL
Qty: 90 TABLET | Refills: 3 | Status: SHIPPED | OUTPATIENT
Start: 2021-04-09 | End: 2022-02-09

## 2021-04-09 RX ORDER — POTASSIUM CHLORIDE 20 MEQ/1
TABLET, EXTENDED RELEASE ORAL
Qty: 90 TABLET | Refills: 3 | Status: SHIPPED | OUTPATIENT
Start: 2021-04-09 | End: 2021-05-11

## 2021-04-20 ENCOUNTER — TELEPHONE (OUTPATIENT)
Dept: FAMILY MEDICINE CLINIC | Facility: CLINIC | Age: 86
End: 2021-04-20

## 2021-04-20 NOTE — TELEPHONE ENCOUNTER
Caller: ANDREW CHATMAN     DAUGHTER CALLED BACK. HUB RELAYED MESSAGE, DAUGHTER EXPRESSED UNDERSTANDING AND WILL CALL AGAIN IF PATIENT IS TAKEN TO ED.

## 2021-04-20 NOTE — TELEPHONE ENCOUNTER
OK hub to share:  IF SYMPTOMS NO BETTER GO TO ER.  Tried calling daughter back not on hippa form, no answer.  Tried calling patient phone picked up but no one spoke.

## 2021-04-20 NOTE — TELEPHONE ENCOUNTER
"THE PERSON THAT CALLED TODAY IS NOT LISTED ON THE  VERBAL. PLEASE ADVISE.    PATIENT'S DAUGHTER, ANDREW CHATMAN IS CALLING TODAY IN REGARDS TO THE PATIENT'S NEW ONSET OF SYMPTOMS. PATIENT HAS BEEN EXPERIENCING THE FOLLOWING SYMPTOMS.    DAUGHTER STATES PATIENT \"LOOKS BAD\"    WEAKNESS    COUGHING    NO MOTIVATION    WILL SIT IN BATHROOM FOR LONG PERIODS OF TIME AND FAMILY DOESN'T KNOW IF SHE IS REALLY USING THE BATHROOM OR NOT.    DAUGHTER STATED THE PATIENT IS NOT AGREEABLE TO ANY TYPE OF COVID VACCINE, TEST, ETC.      DAUGHTER IS REQUESTING GUIDANCE ON WHAT TO DO TO HELP PATIENT.    CALL BACK: [phone]    "

## 2021-04-21 ENCOUNTER — HOSPITAL ENCOUNTER (OUTPATIENT)
Facility: HOSPITAL | Age: 86
Setting detail: OBSERVATION
Discharge: HOME HEALTH | End: 2021-04-26
Attending: EMERGENCY MEDICINE | Admitting: INTERNAL MEDICINE

## 2021-04-21 ENCOUNTER — TELEPHONE (OUTPATIENT)
Dept: FAMILY MEDICINE CLINIC | Facility: CLINIC | Age: 86
End: 2021-04-21

## 2021-04-21 ENCOUNTER — APPOINTMENT (OUTPATIENT)
Dept: GENERAL RADIOLOGY | Facility: HOSPITAL | Age: 86
End: 2021-04-21

## 2021-04-21 DIAGNOSIS — N17.9 ACUTE KIDNEY INJURY (HCC): ICD-10-CM

## 2021-04-21 DIAGNOSIS — J12.82 PNEUMONIA DUE TO COVID-19 VIRUS: Primary | ICD-10-CM

## 2021-04-21 DIAGNOSIS — U07.1 PNEUMONIA DUE TO COVID-19 VIRUS: Primary | ICD-10-CM

## 2021-04-21 PROBLEM — H25.10 SENILE NUCLEAR SCLEROSIS: Status: ACTIVE | Noted: 2021-04-21

## 2021-04-21 PROBLEM — H35.30 MACULAR DEGENERATION (SENILE) OF RETINA: Status: ACTIVE | Noted: 2021-04-21

## 2021-04-21 PROBLEM — Z78.0 POSTMENOPAUSAL STATUS: Status: ACTIVE | Noted: 2018-07-20

## 2021-04-21 PROBLEM — Z90.11: Status: ACTIVE | Noted: 2018-08-30

## 2021-04-21 PROBLEM — Z96.1 LENS REPLACED BY OTHER MEANS: Status: ACTIVE | Noted: 2021-04-21

## 2021-04-21 PROBLEM — D05.10 DUCTAL CARCINOMA IN SITU (DCIS) OF BREAST: Status: ACTIVE | Noted: 2017-08-21

## 2021-04-21 PROBLEM — H01.009 BLEPHARITIS: Status: ACTIVE | Noted: 2021-04-21

## 2021-04-21 PROBLEM — R41.3 MEMORY IMPAIRMENT: Status: ACTIVE | Noted: 2018-07-20

## 2021-04-21 LAB
ALBUMIN SERPL-MCNC: 4.1 G/DL (ref 3.5–5.2)
ALBUMIN/GLOB SERPL: 1.1 G/DL
ALP SERPL-CCNC: 36 U/L (ref 39–117)
ALT SERPL W P-5'-P-CCNC: 14 U/L (ref 1–33)
ANION GAP SERPL CALCULATED.3IONS-SCNC: 18 MMOL/L (ref 5–15)
APTT PPP: 25.9 SECONDS (ref 24–31)
AST SERPL-CCNC: 30 U/L (ref 1–32)
BILIRUB SERPL-MCNC: 0.8 MG/DL (ref 0–1.2)
BUN SERPL-MCNC: 58 MG/DL (ref 8–23)
BUN/CREAT SERPL: 23.4 (ref 7–25)
CALCIUM SPEC-SCNC: 10.1 MG/DL (ref 8.6–10.5)
CHLORIDE SERPL-SCNC: 97 MMOL/L (ref 98–107)
CK SERPL-CCNC: 104 U/L (ref 20–180)
CO2 SERPL-SCNC: 25 MMOL/L (ref 22–29)
CREAT SERPL-MCNC: 2.48 MG/DL (ref 0.57–1)
D-LACTATE SERPL-SCNC: 1.3 MMOL/L (ref 0.5–2)
DEPRECATED RDW RBC AUTO: 42.9 FL (ref 37–54)
ERYTHROCYTE [DISTWIDTH] IN BLOOD BY AUTOMATED COUNT: 13.4 % (ref 12.3–15.4)
GFR SERPL CREATININE-BSD FRML MDRD: 19 ML/MIN/1.73
GIANT PLATELETS: ABNORMAL
GLOBULIN UR ELPH-MCNC: 3.8 GM/DL
GLUCOSE SERPL-MCNC: 104 MG/DL (ref 65–99)
HCT VFR BLD AUTO: 45 % (ref 34–46.6)
HGB BLD-MCNC: 15.6 G/DL (ref 12–15.9)
INR PPP: 1 (ref 0.93–1.1)
LYMPHOCYTES # BLD MANUAL: 1.42 10*3/MM3 (ref 0.7–3.1)
LYMPHOCYTES NFR BLD MANUAL: 23 % (ref 19.6–45.3)
LYMPHOCYTES NFR BLD MANUAL: 9 % (ref 5–12)
MAGNESIUM SERPL-MCNC: 2.3 MG/DL (ref 1.6–2.4)
MCH RBC QN AUTO: 31.9 PG (ref 26.6–33)
MCHC RBC AUTO-ENTMCNC: 34.6 G/DL (ref 31.5–35.7)
MCV RBC AUTO: 92.1 FL (ref 79–97)
MONOCYTES # BLD AUTO: 0.53 10*3/MM3 (ref 0.1–0.9)
NEUTROPHILS # BLD AUTO: 3.95 10*3/MM3 (ref 1.7–7)
NEUTROPHILS NFR BLD MANUAL: 59 % (ref 42.7–76)
NEUTS BAND NFR BLD MANUAL: 8 % (ref 0–5)
NT-PROBNP SERPL-MCNC: 428.1 PG/ML (ref 0–1800)
PLATELET # BLD AUTO: 122 10*3/MM3 (ref 140–450)
PMV BLD AUTO: 12.7 FL (ref 6–12)
POTASSIUM SERPL-SCNC: 3.4 MMOL/L (ref 3.5–5.2)
PROT SERPL-MCNC: 7.9 G/DL (ref 6–8.5)
PROTHROMBIN TIME: 11 SECONDS (ref 9.6–11.7)
RBC # BLD AUTO: 4.89 10*6/MM3 (ref 3.77–5.28)
RBC MORPH BLD: NORMAL
SARS-COV-2 RNA PNL SPEC NAA+PROBE: DETECTED
SCAN SLIDE: NORMAL
SMALL PLATELETS BLD QL SMEAR: ABNORMAL
SODIUM SERPL-SCNC: 140 MMOL/L (ref 136–145)
TROPONIN T SERPL-MCNC: 0.01 NG/ML (ref 0–0.03)
TROPONIN T SERPL-MCNC: 0.01 NG/ML (ref 0–0.03)
VARIANT LYMPHS NFR BLD MANUAL: 1 % (ref 0–5)
WBC # BLD AUTO: 5.9 10*3/MM3 (ref 3.4–10.8)
WBC MORPH BLD: NORMAL

## 2021-04-21 PROCEDURE — 83735 ASSAY OF MAGNESIUM: CPT | Performed by: EMERGENCY MEDICINE

## 2021-04-21 PROCEDURE — G0378 HOSPITAL OBSERVATION PER HR: HCPCS

## 2021-04-21 PROCEDURE — 99219 PR INITIAL OBSERVATION CARE/DAY 50 MINUTES: CPT | Performed by: PHYSICIAN ASSISTANT

## 2021-04-21 PROCEDURE — 82550 ASSAY OF CK (CPK): CPT | Performed by: EMERGENCY MEDICINE

## 2021-04-21 PROCEDURE — 99284 EMERGENCY DEPT VISIT MOD MDM: CPT

## 2021-04-21 PROCEDURE — 71045 X-RAY EXAM CHEST 1 VIEW: CPT

## 2021-04-21 PROCEDURE — 87040 BLOOD CULTURE FOR BACTERIA: CPT | Performed by: EMERGENCY MEDICINE

## 2021-04-21 PROCEDURE — 25010000002 DEXAMETHASONE SODIUM PHOSPHATE 10 MG/ML SOLUTION: Performed by: EMERGENCY MEDICINE

## 2021-04-21 PROCEDURE — 83605 ASSAY OF LACTIC ACID: CPT

## 2021-04-21 PROCEDURE — 85610 PROTHROMBIN TIME: CPT | Performed by: EMERGENCY MEDICINE

## 2021-04-21 PROCEDURE — 93005 ELECTROCARDIOGRAM TRACING: CPT | Performed by: EMERGENCY MEDICINE

## 2021-04-21 PROCEDURE — 85007 BL SMEAR W/DIFF WBC COUNT: CPT | Performed by: EMERGENCY MEDICINE

## 2021-04-21 PROCEDURE — C9803 HOPD COVID-19 SPEC COLLECT: HCPCS

## 2021-04-21 PROCEDURE — 96375 TX/PRO/DX INJ NEW DRUG ADDON: CPT

## 2021-04-21 PROCEDURE — 84484 ASSAY OF TROPONIN QUANT: CPT | Performed by: EMERGENCY MEDICINE

## 2021-04-21 PROCEDURE — 83880 ASSAY OF NATRIURETIC PEPTIDE: CPT | Performed by: EMERGENCY MEDICINE

## 2021-04-21 PROCEDURE — 85730 THROMBOPLASTIN TIME PARTIAL: CPT | Performed by: EMERGENCY MEDICINE

## 2021-04-21 PROCEDURE — 85025 COMPLETE CBC W/AUTO DIFF WBC: CPT | Performed by: EMERGENCY MEDICINE

## 2021-04-21 PROCEDURE — 80053 COMPREHEN METABOLIC PANEL: CPT | Performed by: EMERGENCY MEDICINE

## 2021-04-21 PROCEDURE — U0003 INFECTIOUS AGENT DETECTION BY NUCLEIC ACID (DNA OR RNA); SEVERE ACUTE RESPIRATORY SYNDROME CORONAVIRUS 2 (SARS-COV-2) (CORONAVIRUS DISEASE [COVID-19]), AMPLIFIED PROBE TECHNIQUE, MAKING USE OF HIGH THROUGHPUT TECHNOLOGIES AS DESCRIBED BY CMS-2020-01-R: HCPCS | Performed by: EMERGENCY MEDICINE

## 2021-04-21 RX ORDER — ONDANSETRON 2 MG/ML
4 INJECTION INTRAMUSCULAR; INTRAVENOUS EVERY 6 HOURS PRN
Status: DISCONTINUED | OUTPATIENT
Start: 2021-04-21 | End: 2021-04-26 | Stop reason: HOSPADM

## 2021-04-21 RX ORDER — SODIUM CHLORIDE 0.9 % (FLUSH) 0.9 %
3-10 SYRINGE (ML) INJECTION AS NEEDED
Status: DISCONTINUED | OUTPATIENT
Start: 2021-04-21 | End: 2021-04-26 | Stop reason: HOSPADM

## 2021-04-21 RX ORDER — POTASSIUM CHLORIDE 20 MEQ/1
20 TABLET, EXTENDED RELEASE ORAL DAILY
Status: DISCONTINUED | OUTPATIENT
Start: 2021-04-22 | End: 2021-04-26 | Stop reason: HOSPADM

## 2021-04-21 RX ORDER — CHOLECALCIFEROL (VITAMIN D3) 125 MCG
5 CAPSULE ORAL NIGHTLY PRN
Status: DISCONTINUED | OUTPATIENT
Start: 2021-04-21 | End: 2021-04-26 | Stop reason: HOSPADM

## 2021-04-21 RX ORDER — SODIUM CHLORIDE 0.9 % (FLUSH) 0.9 %
10 SYRINGE (ML) INJECTION AS NEEDED
Status: DISCONTINUED | OUTPATIENT
Start: 2021-04-21 | End: 2021-04-26 | Stop reason: HOSPADM

## 2021-04-21 RX ORDER — LEVOTHYROXINE SODIUM 0.1 MG/1
100 TABLET ORAL
Status: DISCONTINUED | OUTPATIENT
Start: 2021-04-22 | End: 2021-04-25

## 2021-04-21 RX ORDER — ALBUTEROL SULFATE 2.5 MG/3ML
2.5 SOLUTION RESPIRATORY (INHALATION) EVERY 6 HOURS PRN
Status: DISCONTINUED | OUTPATIENT
Start: 2021-04-21 | End: 2021-04-26 | Stop reason: HOSPADM

## 2021-04-21 RX ORDER — HYDRALAZINE HYDROCHLORIDE 25 MG/1
25 TABLET, FILM COATED ORAL EVERY 6 HOURS PRN
Status: DISCONTINUED | OUTPATIENT
Start: 2021-04-21 | End: 2021-04-24

## 2021-04-21 RX ORDER — DEXAMETHASONE 4 MG/1
4 TABLET ORAL
Status: DISCONTINUED | OUTPATIENT
Start: 2021-04-22 | End: 2021-04-26 | Stop reason: HOSPADM

## 2021-04-21 RX ORDER — SODIUM CHLORIDE 0.9 % (FLUSH) 0.9 %
3 SYRINGE (ML) INJECTION EVERY 12 HOURS SCHEDULED
Status: DISCONTINUED | OUTPATIENT
Start: 2021-04-21 | End: 2021-04-26 | Stop reason: HOSPADM

## 2021-04-21 RX ORDER — ATORVASTATIN CALCIUM 10 MG/1
10 TABLET, FILM COATED ORAL DAILY
Refills: 3 | Status: DISCONTINUED | OUTPATIENT
Start: 2021-04-22 | End: 2021-04-26 | Stop reason: HOSPADM

## 2021-04-21 RX ORDER — ONDANSETRON 4 MG/1
4 TABLET, FILM COATED ORAL EVERY 6 HOURS PRN
Status: DISCONTINUED | OUTPATIENT
Start: 2021-04-21 | End: 2021-04-26 | Stop reason: HOSPADM

## 2021-04-21 RX ORDER — SODIUM CHLORIDE 9 MG/ML
75 INJECTION, SOLUTION INTRAVENOUS ONCE
Status: COMPLETED | OUTPATIENT
Start: 2021-04-21 | End: 2021-04-22

## 2021-04-21 RX ORDER — DEXAMETHASONE SODIUM PHOSPHATE 10 MG/ML
6 INJECTION, SOLUTION INTRAMUSCULAR; INTRAVENOUS ONCE
Status: COMPLETED | OUTPATIENT
Start: 2021-04-21 | End: 2021-04-21

## 2021-04-21 RX ORDER — ACETAMINOPHEN 500 MG
500 TABLET ORAL EVERY 6 HOURS PRN
Status: DISCONTINUED | OUTPATIENT
Start: 2021-04-21 | End: 2021-04-26 | Stop reason: HOSPADM

## 2021-04-21 RX ORDER — ALUMINA, MAGNESIA, AND SIMETHICONE 2400; 2400; 240 MG/30ML; MG/30ML; MG/30ML
15 SUSPENSION ORAL EVERY 6 HOURS PRN
Status: DISCONTINUED | OUTPATIENT
Start: 2021-04-21 | End: 2021-04-26 | Stop reason: HOSPADM

## 2021-04-21 RX ORDER — ACETAMINOPHEN 500 MG
500 TABLET ORAL EVERY 6 HOURS PRN
COMMUNITY

## 2021-04-21 RX ADMIN — SODIUM CHLORIDE 500 ML: 9 INJECTION, SOLUTION INTRAVENOUS at 18:06

## 2021-04-21 RX ADMIN — DEXAMETHASONE SODIUM PHOSPHATE 6 MG: 10 INJECTION, SOLUTION INTRAMUSCULAR; INTRAVENOUS at 21:35

## 2021-04-21 NOTE — TELEPHONE ENCOUNTER
Called patient spoke with  daughter Lorraine lovelace, she states that patient has not eatings last 2 to 3 days and she is only taking sips of fluids.  She feels weak, fatigue and also not taking any of her routine medications.  Patient has known exposure for Covid her daughter and grandson was tested  positive a week ago the patient refused to test for COVID-19 at that time.  Advised to take patient to ER for further evaluation and management.

## 2021-04-21 NOTE — TELEPHONE ENCOUNTER
PATIENTS DAUGHTER ANDREW CHATMAN WHO IS NOT ON THE VERBAL SO I ONLY OBTAINED INFO, DID NOT GIVER ANY OUT... IS CALLING BACK IN REFERENCE TO LAST MESSAGE THAT WAS SIGNED OFF ON YESTERDAY.... PATIENT IS LOOKING BAD, WEAK, COUGHING, NO MOTIVATION ETC.... SHE NOW IS NOT TAKING HER MEDICINE, SITTING AND SITTING ON THE TOILET... ANDREW CHATMAN THE DAUGHTER IS REQUESTING DR. GILL TO CALL AND SPEAK WITH PATIENT TO DETERMINE THE BEST OPTION FOR HER, PATIENT WOULD LIKE TO DISCUSS WHAT SHE IS FEELING WITH DR. GILL BEFORE UTILIZING THE [facility] OR ED..       PLEASE CALL PATIENT TO DISCUSS HER SYMPTOMS AND OPTIONS AS SHE HAS REQUESTED AT: [phone]

## 2021-04-22 ENCOUNTER — TELEPHONE (OUTPATIENT)
Dept: FAMILY MEDICINE CLINIC | Facility: CLINIC | Age: 86
End: 2021-04-22

## 2021-04-22 LAB
ANION GAP SERPL CALCULATED.3IONS-SCNC: 16 MMOL/L (ref 5–15)
BASOPHILS # BLD AUTO: 0 10*3/MM3 (ref 0–0.2)
BASOPHILS NFR BLD AUTO: 0.4 % (ref 0–1.5)
BUN SERPL-MCNC: 57 MG/DL (ref 8–23)
BUN/CREAT SERPL: 28.1 (ref 7–25)
CALCIUM SPEC-SCNC: 10.3 MG/DL (ref 8.6–10.5)
CHLORIDE SERPL-SCNC: 104 MMOL/L (ref 98–107)
CO2 SERPL-SCNC: 22 MMOL/L (ref 22–29)
CREAT SERPL-MCNC: 2.03 MG/DL (ref 0.57–1)
D DIMER PPP FEU-MCNC: 4 MG/L (FEU) (ref 0–0.59)
DEPRECATED RDW RBC AUTO: 41.6 FL (ref 37–54)
EOSINOPHIL # BLD AUTO: 0 10*3/MM3 (ref 0–0.4)
EOSINOPHIL NFR BLD AUTO: 0 % (ref 0.3–6.2)
ERYTHROCYTE [DISTWIDTH] IN BLOOD BY AUTOMATED COUNT: 12.9 % (ref 12.3–15.4)
FERRITIN SERPL-MCNC: 1126 NG/ML (ref 13–150)
GFR SERPL CREATININE-BSD FRML MDRD: 23 ML/MIN/1.73
GIANT PLATELETS: NORMAL
GLUCOSE SERPL-MCNC: 137 MG/DL (ref 65–99)
HCT VFR BLD AUTO: 40.5 % (ref 34–46.6)
HGB BLD-MCNC: 13.8 G/DL (ref 12–15.9)
LDH SERPL-CCNC: 252 U/L (ref 135–214)
LYMPHOCYTES # BLD AUTO: 0.8 10*3/MM3 (ref 0.7–3.1)
LYMPHOCYTES NFR BLD AUTO: 21.6 % (ref 19.6–45.3)
MCH RBC QN AUTO: 31.1 PG (ref 26.6–33)
MCHC RBC AUTO-ENTMCNC: 34.1 G/DL (ref 31.5–35.7)
MCV RBC AUTO: 91.1 FL (ref 79–97)
MONOCYTES # BLD AUTO: 0.2 10*3/MM3 (ref 0.1–0.9)
MONOCYTES NFR BLD AUTO: 6 % (ref 5–12)
NEUTROPHILS NFR BLD AUTO: 2.7 10*3/MM3 (ref 1.7–7)
NEUTROPHILS NFR BLD AUTO: 72 % (ref 42.7–76)
NRBC BLD AUTO-RTO: 0.3 /100 WBC (ref 0–0.2)
PLATELET # BLD AUTO: 124 10*3/MM3 (ref 140–450)
PMV BLD AUTO: 12.7 FL (ref 6–12)
POTASSIUM SERPL-SCNC: 3.8 MMOL/L (ref 3.5–5.2)
PROCALCITONIN SERPL-MCNC: 0.1 NG/ML (ref 0–0.25)
QT INTERVAL: 398 MS
RBC # BLD AUTO: 4.45 10*6/MM3 (ref 3.77–5.28)
RBC MORPH BLD: NORMAL
SODIUM SERPL-SCNC: 142 MMOL/L (ref 136–145)
TSH SERPL DL<=0.05 MIU/L-ACNC: 0.28 UIU/ML (ref 0.27–4.2)
WBC # BLD AUTO: 3.7 10*3/MM3 (ref 3.4–10.8)
WBC MORPH BLD: NORMAL

## 2021-04-22 PROCEDURE — G0378 HOSPITAL OBSERVATION PER HR: HCPCS

## 2021-04-22 PROCEDURE — 80048 BASIC METABOLIC PNL TOTAL CA: CPT | Performed by: PHYSICIAN ASSISTANT

## 2021-04-22 PROCEDURE — 99225 PR SBSQ OBSERVATION CARE/DAY 25 MINUTES: CPT | Performed by: INTERNAL MEDICINE

## 2021-04-22 PROCEDURE — 97162 PT EVAL MOD COMPLEX 30 MIN: CPT

## 2021-04-22 PROCEDURE — 36410 VNPNXR 3YR/> PHY/QHP DX/THER: CPT

## 2021-04-22 PROCEDURE — 82728 ASSAY OF FERRITIN: CPT | Performed by: INTERNAL MEDICINE

## 2021-04-22 PROCEDURE — 97535 SELF CARE MNGMENT TRAINING: CPT

## 2021-04-22 PROCEDURE — 85007 BL SMEAR W/DIFF WBC COUNT: CPT | Performed by: PHYSICIAN ASSISTANT

## 2021-04-22 PROCEDURE — 84145 PROCALCITONIN (PCT): CPT | Performed by: INTERNAL MEDICINE

## 2021-04-22 PROCEDURE — 84443 ASSAY THYROID STIM HORMONE: CPT | Performed by: PHYSICIAN ASSISTANT

## 2021-04-22 PROCEDURE — 25010000002 HEPARIN (PORCINE) PER 1000 UNITS: Performed by: INTERNAL MEDICINE

## 2021-04-22 PROCEDURE — 85025 COMPLETE CBC W/AUTO DIFF WBC: CPT | Performed by: PHYSICIAN ASSISTANT

## 2021-04-22 PROCEDURE — 85379 FIBRIN DEGRADATION QUANT: CPT | Performed by: INTERNAL MEDICINE

## 2021-04-22 PROCEDURE — 63710000001 DEXAMETHASONE PER 0.25 MG: Performed by: PHYSICIAN ASSISTANT

## 2021-04-22 PROCEDURE — 96372 THER/PROPH/DIAG INJ SC/IM: CPT

## 2021-04-22 PROCEDURE — 97165 OT EVAL LOW COMPLEX 30 MIN: CPT

## 2021-04-22 PROCEDURE — C1751 CATH, INF, PER/CENT/MIDLINE: HCPCS

## 2021-04-22 PROCEDURE — 83615 LACTATE (LD) (LDH) ENZYME: CPT | Performed by: INTERNAL MEDICINE

## 2021-04-22 RX ORDER — MELATONIN
1000 DAILY
Status: DISCONTINUED | OUTPATIENT
Start: 2021-04-22 | End: 2021-04-26 | Stop reason: HOSPADM

## 2021-04-22 RX ORDER — FAMOTIDINE 20 MG/1
20 TABLET, FILM COATED ORAL
Status: DISCONTINUED | OUTPATIENT
Start: 2021-04-22 | End: 2021-04-23

## 2021-04-22 RX ORDER — ASCORBIC ACID 500 MG
500 TABLET ORAL DAILY
Status: DISCONTINUED | OUTPATIENT
Start: 2021-04-22 | End: 2021-04-26 | Stop reason: HOSPADM

## 2021-04-22 RX ORDER — HEPARIN SODIUM 5000 [USP'U]/ML
5000 INJECTION, SOLUTION INTRAVENOUS; SUBCUTANEOUS EVERY 12 HOURS SCHEDULED
Status: DISCONTINUED | OUTPATIENT
Start: 2021-04-22 | End: 2021-04-23

## 2021-04-22 RX ORDER — SODIUM CHLORIDE 0.9 % (FLUSH) 0.9 %
10 SYRINGE (ML) INJECTION EVERY 12 HOURS SCHEDULED
Status: DISCONTINUED | OUTPATIENT
Start: 2021-04-22 | End: 2021-04-26 | Stop reason: HOSPADM

## 2021-04-22 RX ORDER — SODIUM CHLORIDE 0.9 % (FLUSH) 0.9 %
10 SYRINGE (ML) INJECTION AS NEEDED
Status: DISCONTINUED | OUTPATIENT
Start: 2021-04-22 | End: 2021-04-26 | Stop reason: HOSPADM

## 2021-04-22 RX ADMIN — Medication 10 ML: at 20:21

## 2021-04-22 RX ADMIN — Medication 3 ML: at 00:12

## 2021-04-22 RX ADMIN — DEXAMETHASONE 4 MG: 4 TABLET ORAL at 08:17

## 2021-04-22 RX ADMIN — FAMOTIDINE 20 MG: 20 TABLET ORAL at 17:58

## 2021-04-22 RX ADMIN — HEPARIN SODIUM 5000 UNITS: 5000 INJECTION INTRAVENOUS; SUBCUTANEOUS at 20:20

## 2021-04-22 RX ADMIN — Medication 1000 UNITS: at 17:57

## 2021-04-22 RX ADMIN — Medication 3 ML: at 20:21

## 2021-04-22 RX ADMIN — SODIUM CHLORIDE 75 ML/HR: 9 INJECTION, SOLUTION INTRAVENOUS at 00:12

## 2021-04-22 RX ADMIN — OXYCODONE HYDROCHLORIDE AND ACETAMINOPHEN 500 MG: 500 TABLET ORAL at 17:58

## 2021-04-22 RX ADMIN — POTASSIUM CHLORIDE 20 MEQ: 1500 TABLET, EXTENDED RELEASE ORAL at 08:17

## 2021-04-22 RX ADMIN — LEVOTHYROXINE SODIUM 100 MCG: 0.1 TABLET ORAL at 05:12

## 2021-04-22 RX ADMIN — ATORVASTATIN CALCIUM 10 MG: 10 TABLET, FILM COATED ORAL at 08:17

## 2021-04-22 NOTE — TELEPHONE ENCOUNTER
I have already spoke to the daughter yesterday for 15-20 minutes.  The patient is admitted at [facility] if she has any question concern she should talk to the hospital doctor's  who is taking care of patient currently.

## 2021-04-22 NOTE — TELEPHONE ENCOUNTER
Called and spoke with daughter.  She just wanted to give Dr. Arana an update that her mother was in the hospital and was positive for Covid and pneumonia.

## 2021-04-22 NOTE — TELEPHONE ENCOUNTER
PATIENTS DAUGHTER REQUESTED TO SPEAK TO DR. GILL.  PATIENT WAS TRANSPORTED TO [facility] IN [place].  SHE WAS ADMITTED TO THE HOSPITAL.  THE DIAGNOSIS: COVID AND PNEUMONIA, POSSIBLE KIDNEY FAILURE DUE TO DEHYDRATION.  PLEASE ADVISE    CALL BACK #: [phone]     * PLEASE NOTE THAT DAUGHTER IS NO ON RELEASE FORM

## 2021-04-23 LAB
ALBUMIN SERPL-MCNC: 3.5 G/DL (ref 3.5–5.2)
ALBUMIN/GLOB SERPL: 1.1 G/DL
ALP SERPL-CCNC: 30 U/L (ref 39–117)
ALT SERPL W P-5'-P-CCNC: 11 U/L (ref 1–33)
ANION GAP SERPL CALCULATED.3IONS-SCNC: 10 MMOL/L (ref 5–15)
AST SERPL-CCNC: 26 U/L (ref 1–32)
BASOPHILS # BLD AUTO: 0.1 10*3/MM3 (ref 0–0.2)
BASOPHILS NFR BLD AUTO: 0.7 % (ref 0–1.5)
BILIRUB SERPL-MCNC: 0.7 MG/DL (ref 0–1.2)
BILIRUB UR QL STRIP: NEGATIVE
BUN SERPL-MCNC: 57 MG/DL (ref 8–23)
BUN/CREAT SERPL: 35.2 (ref 7–25)
CALCIUM SPEC-SCNC: 10.3 MG/DL (ref 8.6–10.5)
CHLORIDE SERPL-SCNC: 106 MMOL/L (ref 98–107)
CK SERPL-CCNC: 103 U/L (ref 20–180)
CLARITY UR: CLEAR
CO2 SERPL-SCNC: 26 MMOL/L (ref 22–29)
COLOR UR: YELLOW
CREAT SERPL-MCNC: 1.62 MG/DL (ref 0.57–1)
CRP SERPL-MCNC: 1.28 MG/DL (ref 0–0.5)
D DIMER PPP FEU-MCNC: 3.77 MG/L (FEU) (ref 0–0.59)
DEPRECATED RDW RBC AUTO: 42.4 FL (ref 37–54)
EOSINOPHIL # BLD AUTO: 0 10*3/MM3 (ref 0–0.4)
EOSINOPHIL NFR BLD AUTO: 0 % (ref 0.3–6.2)
ERYTHROCYTE [DISTWIDTH] IN BLOOD BY AUTOMATED COUNT: 13.2 % (ref 12.3–15.4)
FERRITIN SERPL-MCNC: 971.2 NG/ML (ref 13–150)
FIBRINOGEN PPP-MCNC: 373 MG/DL (ref 210–450)
GFR SERPL CREATININE-BSD FRML MDRD: 30 ML/MIN/1.73
GIANT PLATELETS: NORMAL
GLOBULIN UR ELPH-MCNC: 3.1 GM/DL
GLUCOSE SERPL-MCNC: 105 MG/DL (ref 65–99)
GLUCOSE UR STRIP-MCNC: NEGATIVE MG/DL
HCT VFR BLD AUTO: 38.3 % (ref 34–46.6)
HGB BLD-MCNC: 13.1 G/DL (ref 12–15.9)
HGB UR QL STRIP.AUTO: NEGATIVE
KETONES UR QL STRIP: ABNORMAL
LDH SERPL-CCNC: 289 U/L (ref 135–214)
LEUKOCYTE ESTERASE UR QL STRIP.AUTO: NEGATIVE
LYMPHOCYTES # BLD AUTO: 1.5 10*3/MM3 (ref 0.7–3.1)
LYMPHOCYTES NFR BLD AUTO: 16.8 % (ref 19.6–45.3)
MCH RBC QN AUTO: 31.2 PG (ref 26.6–33)
MCHC RBC AUTO-ENTMCNC: 34.1 G/DL (ref 31.5–35.7)
MCV RBC AUTO: 91.5 FL (ref 79–97)
MONOCYTES # BLD AUTO: 1 10*3/MM3 (ref 0.1–0.9)
MONOCYTES NFR BLD AUTO: 10.9 % (ref 5–12)
NEUTROPHILS NFR BLD AUTO: 6.5 10*3/MM3 (ref 1.7–7)
NEUTROPHILS NFR BLD AUTO: 71.6 % (ref 42.7–76)
NITRITE UR QL STRIP: NEGATIVE
NRBC BLD AUTO-RTO: 0.1 /100 WBC (ref 0–0.2)
PH UR STRIP.AUTO: 5.5 [PH] (ref 5–8)
PLATELET # BLD AUTO: 117 10*3/MM3 (ref 140–450)
PMV BLD AUTO: 11.7 FL (ref 6–12)
POTASSIUM SERPL-SCNC: 4 MMOL/L (ref 3.5–5.2)
PROT SERPL-MCNC: 6.6 G/DL (ref 6–8.5)
PROT UR QL STRIP: NEGATIVE
RBC # BLD AUTO: 4.19 10*6/MM3 (ref 3.77–5.28)
RBC MORPH BLD: NORMAL
SODIUM SERPL-SCNC: 142 MMOL/L (ref 136–145)
SP GR UR STRIP: 1.02 (ref 1–1.03)
UROBILINOGEN UR QL STRIP: ABNORMAL
WBC # BLD AUTO: 9 10*3/MM3 (ref 3.4–10.8)
WBC MORPH BLD: NORMAL

## 2021-04-23 PROCEDURE — 99225 PR SBSQ OBSERVATION CARE/DAY 25 MINUTES: CPT | Performed by: INTERNAL MEDICINE

## 2021-04-23 PROCEDURE — 25010000002 HEPARIN (PORCINE) PER 1000 UNITS: Performed by: INTERNAL MEDICINE

## 2021-04-23 PROCEDURE — 97530 THERAPEUTIC ACTIVITIES: CPT

## 2021-04-23 PROCEDURE — 81003 URINALYSIS AUTO W/O SCOPE: CPT | Performed by: PHYSICIAN ASSISTANT

## 2021-04-23 PROCEDURE — G0378 HOSPITAL OBSERVATION PER HR: HCPCS

## 2021-04-23 PROCEDURE — 83615 LACTATE (LD) (LDH) ENZYME: CPT | Performed by: INTERNAL MEDICINE

## 2021-04-23 PROCEDURE — 85379 FIBRIN DEGRADATION QUANT: CPT | Performed by: INTERNAL MEDICINE

## 2021-04-23 PROCEDURE — 82728 ASSAY OF FERRITIN: CPT | Performed by: INTERNAL MEDICINE

## 2021-04-23 PROCEDURE — 85007 BL SMEAR W/DIFF WBC COUNT: CPT | Performed by: PHYSICIAN ASSISTANT

## 2021-04-23 PROCEDURE — 97116 GAIT TRAINING THERAPY: CPT

## 2021-04-23 PROCEDURE — 85025 COMPLETE CBC W/AUTO DIFF WBC: CPT | Performed by: PHYSICIAN ASSISTANT

## 2021-04-23 PROCEDURE — 80053 COMPREHEN METABOLIC PANEL: CPT | Performed by: INTERNAL MEDICINE

## 2021-04-23 PROCEDURE — 96372 THER/PROPH/DIAG INJ SC/IM: CPT

## 2021-04-23 PROCEDURE — 85384 FIBRINOGEN ACTIVITY: CPT | Performed by: INTERNAL MEDICINE

## 2021-04-23 PROCEDURE — 93010 ELECTROCARDIOGRAM REPORT: CPT | Performed by: INTERNAL MEDICINE

## 2021-04-23 PROCEDURE — 93005 ELECTROCARDIOGRAM TRACING: CPT | Performed by: INTERNAL MEDICINE

## 2021-04-23 PROCEDURE — 82550 ASSAY OF CK (CPK): CPT | Performed by: INTERNAL MEDICINE

## 2021-04-23 PROCEDURE — 86140 C-REACTIVE PROTEIN: CPT | Performed by: INTERNAL MEDICINE

## 2021-04-23 PROCEDURE — 63710000001 DEXAMETHASONE PER 0.25 MG: Performed by: PHYSICIAN ASSISTANT

## 2021-04-23 RX ORDER — HEPARIN SODIUM 5000 [USP'U]/ML
7500 INJECTION, SOLUTION INTRAVENOUS; SUBCUTANEOUS EVERY 8 HOURS SCHEDULED
Status: DISCONTINUED | OUTPATIENT
Start: 2021-04-23 | End: 2021-04-26 | Stop reason: HOSPADM

## 2021-04-23 RX ORDER — DILTIAZEM HYDROCHLORIDE 5 MG/ML
10 INJECTION INTRAVENOUS ONCE
Status: COMPLETED | OUTPATIENT
Start: 2021-04-24 | End: 2021-04-24

## 2021-04-23 RX ORDER — FAMOTIDINE 20 MG/1
20 TABLET, FILM COATED ORAL DAILY
Status: DISCONTINUED | OUTPATIENT
Start: 2021-04-24 | End: 2021-04-26 | Stop reason: HOSPADM

## 2021-04-23 RX ADMIN — DEXAMETHASONE 4 MG: 4 TABLET ORAL at 09:31

## 2021-04-23 RX ADMIN — POTASSIUM CHLORIDE 20 MEQ: 1500 TABLET, EXTENDED RELEASE ORAL at 09:31

## 2021-04-23 RX ADMIN — LEVOTHYROXINE SODIUM 100 MCG: 0.1 TABLET ORAL at 06:00

## 2021-04-23 RX ADMIN — OXYCODONE HYDROCHLORIDE AND ACETAMINOPHEN 500 MG: 500 TABLET ORAL at 09:31

## 2021-04-23 RX ADMIN — FAMOTIDINE 20 MG: 20 TABLET ORAL at 09:31

## 2021-04-23 RX ADMIN — Medication 3 ML: at 20:40

## 2021-04-23 RX ADMIN — Medication 3 ML: at 09:32

## 2021-04-23 RX ADMIN — Medication 1000 UNITS: at 09:31

## 2021-04-23 RX ADMIN — HEPARIN SODIUM 7500 UNITS: 5000 INJECTION INTRAVENOUS; SUBCUTANEOUS at 09:31

## 2021-04-23 RX ADMIN — Medication 10 ML: at 20:40

## 2021-04-23 RX ADMIN — ATORVASTATIN CALCIUM 10 MG: 10 TABLET, FILM COATED ORAL at 09:31

## 2021-04-23 RX ADMIN — HEPARIN SODIUM 7500 UNITS: 5000 INJECTION INTRAVENOUS; SUBCUTANEOUS at 21:24

## 2021-04-24 ENCOUNTER — APPOINTMENT (OUTPATIENT)
Dept: CARDIOLOGY | Facility: HOSPITAL | Age: 86
End: 2021-04-24

## 2021-04-24 LAB
ALBUMIN SERPL-MCNC: 3.8 G/DL (ref 3.5–5.2)
ALBUMIN/GLOB SERPL: 1.1 G/DL
ALP SERPL-CCNC: 33 U/L (ref 39–117)
ALT SERPL W P-5'-P-CCNC: 15 U/L (ref 1–33)
ANION GAP SERPL CALCULATED.3IONS-SCNC: 12 MMOL/L (ref 5–15)
AST SERPL-CCNC: 33 U/L (ref 1–32)
BASOPHILS # BLD AUTO: 0 10*3/MM3 (ref 0–0.2)
BASOPHILS NFR BLD AUTO: 0.3 % (ref 0–1.5)
BILIRUB SERPL-MCNC: 0.7 MG/DL (ref 0–1.2)
BUN SERPL-MCNC: 52 MG/DL (ref 8–23)
BUN/CREAT SERPL: 31.7 (ref 7–25)
CALCIUM SPEC-SCNC: 10.6 MG/DL (ref 8.6–10.5)
CHLORIDE SERPL-SCNC: 105 MMOL/L (ref 98–107)
CK SERPL-CCNC: 133 U/L (ref 20–180)
CO2 SERPL-SCNC: 26 MMOL/L (ref 22–29)
CREAT SERPL-MCNC: 1.64 MG/DL (ref 0.57–1)
CRP SERPL-MCNC: 0.79 MG/DL (ref 0–0.5)
D DIMER PPP FEU-MCNC: 2.35 MG/L (FEU) (ref 0–0.59)
DEPRECATED RDW RBC AUTO: 42.4 FL (ref 37–54)
EOSINOPHIL # BLD AUTO: 0 10*3/MM3 (ref 0–0.4)
EOSINOPHIL NFR BLD AUTO: 0 % (ref 0.3–6.2)
ERYTHROCYTE [DISTWIDTH] IN BLOOD BY AUTOMATED COUNT: 13.3 % (ref 12.3–15.4)
FERRITIN SERPL-MCNC: 1072 NG/ML (ref 13–150)
GFR SERPL CREATININE-BSD FRML MDRD: 30 ML/MIN/1.73
GIANT PLATELETS: NORMAL
GLOBULIN UR ELPH-MCNC: 3.5 GM/DL
GLUCOSE SERPL-MCNC: 103 MG/DL (ref 65–99)
HCT VFR BLD AUTO: 42.3 % (ref 34–46.6)
HGB BLD-MCNC: 14.5 G/DL (ref 12–15.9)
LYMPHOCYTES # BLD AUTO: 1.1 10*3/MM3 (ref 0.7–3.1)
LYMPHOCYTES NFR BLD AUTO: 12.6 % (ref 19.6–45.3)
MCH RBC QN AUTO: 31.3 PG (ref 26.6–33)
MCHC RBC AUTO-ENTMCNC: 34.3 G/DL (ref 31.5–35.7)
MCV RBC AUTO: 91.1 FL (ref 79–97)
MONOCYTES # BLD AUTO: 1.1 10*3/MM3 (ref 0.1–0.9)
MONOCYTES NFR BLD AUTO: 12.2 % (ref 5–12)
NEUTROPHILS NFR BLD AUTO: 6.5 10*3/MM3 (ref 1.7–7)
NEUTROPHILS NFR BLD AUTO: 74.9 % (ref 42.7–76)
NRBC BLD AUTO-RTO: 0.3 /100 WBC (ref 0–0.2)
PLATELET # BLD AUTO: 157 10*3/MM3 (ref 140–450)
PMV BLD AUTO: 12.4 FL (ref 6–12)
POTASSIUM SERPL-SCNC: 3.4 MMOL/L (ref 3.5–5.2)
PROT SERPL-MCNC: 7.3 G/DL (ref 6–8.5)
RBC # BLD AUTO: 4.65 10*6/MM3 (ref 3.77–5.28)
RBC MORPH BLD: NORMAL
SODIUM SERPL-SCNC: 143 MMOL/L (ref 136–145)
T3FREE SERPL-MCNC: 1.56 PG/ML (ref 2–4.4)
T4 FREE SERPL-MCNC: 2.44 NG/DL (ref 0.93–1.7)
TROPONIN T SERPL-MCNC: 0.01 NG/ML (ref 0–0.03)
TROPONIN T SERPL-MCNC: 0.01 NG/ML (ref 0–0.03)
TROPONIN T SERPL-MCNC: <0.01 NG/ML (ref 0–0.03)
TSH SERPL DL<=0.05 MIU/L-ACNC: 0.45 UIU/ML (ref 0.27–4.2)
WBC # BLD AUTO: 8.6 10*3/MM3 (ref 3.4–10.8)
WBC MORPH BLD: NORMAL

## 2021-04-24 PROCEDURE — 84439 ASSAY OF FREE THYROXINE: CPT | Performed by: INTERNAL MEDICINE

## 2021-04-24 PROCEDURE — 85379 FIBRIN DEGRADATION QUANT: CPT | Performed by: INTERNAL MEDICINE

## 2021-04-24 PROCEDURE — 96372 THER/PROPH/DIAG INJ SC/IM: CPT

## 2021-04-24 PROCEDURE — 80053 COMPREHEN METABOLIC PANEL: CPT | Performed by: INTERNAL MEDICINE

## 2021-04-24 PROCEDURE — 84443 ASSAY THYROID STIM HORMONE: CPT | Performed by: INTERNAL MEDICINE

## 2021-04-24 PROCEDURE — 93308 TTE F-UP OR LMTD: CPT

## 2021-04-24 PROCEDURE — 93325 DOPPLER ECHO COLOR FLOW MAPG: CPT

## 2021-04-24 PROCEDURE — 82728 ASSAY OF FERRITIN: CPT | Performed by: INTERNAL MEDICINE

## 2021-04-24 PROCEDURE — 93005 ELECTROCARDIOGRAM TRACING: CPT | Performed by: INTERNAL MEDICINE

## 2021-04-24 PROCEDURE — 96365 THER/PROPH/DIAG IV INF INIT: CPT

## 2021-04-24 PROCEDURE — 84481 FREE ASSAY (FT-3): CPT | Performed by: INTERNAL MEDICINE

## 2021-04-24 PROCEDURE — 96367 TX/PROPH/DG ADDL SEQ IV INF: CPT

## 2021-04-24 PROCEDURE — 96361 HYDRATE IV INFUSION ADD-ON: CPT

## 2021-04-24 PROCEDURE — 99226 PR SBSQ OBSERVATION CARE/DAY 35 MINUTES: CPT | Performed by: INTERNAL MEDICINE

## 2021-04-24 PROCEDURE — 82550 ASSAY OF CK (CPK): CPT | Performed by: INTERNAL MEDICINE

## 2021-04-24 PROCEDURE — 96376 TX/PRO/DX INJ SAME DRUG ADON: CPT

## 2021-04-24 PROCEDURE — 85007 BL SMEAR W/DIFF WBC COUNT: CPT | Performed by: PHYSICIAN ASSISTANT

## 2021-04-24 PROCEDURE — 93325 DOPPLER ECHO COLOR FLOW MAPG: CPT | Performed by: INTERNAL MEDICINE

## 2021-04-24 PROCEDURE — 84484 ASSAY OF TROPONIN QUANT: CPT | Performed by: INTERNAL MEDICINE

## 2021-04-24 PROCEDURE — 96366 THER/PROPH/DIAG IV INF ADDON: CPT

## 2021-04-24 PROCEDURE — G0378 HOSPITAL OBSERVATION PER HR: HCPCS

## 2021-04-24 PROCEDURE — 25010000002 AMIODARONE IN DEXTROSE 5% 150-4.21 MG/100ML-% SOLUTION: Performed by: NURSE PRACTITIONER

## 2021-04-24 PROCEDURE — 86140 C-REACTIVE PROTEIN: CPT | Performed by: INTERNAL MEDICINE

## 2021-04-24 PROCEDURE — 85025 COMPLETE CBC W/AUTO DIFF WBC: CPT | Performed by: PHYSICIAN ASSISTANT

## 2021-04-24 PROCEDURE — 99204 OFFICE O/P NEW MOD 45 MIN: CPT | Performed by: INTERNAL MEDICINE

## 2021-04-24 PROCEDURE — 25010000002 HEPARIN (PORCINE) PER 1000 UNITS: Performed by: INTERNAL MEDICINE

## 2021-04-24 PROCEDURE — 93010 ELECTROCARDIOGRAM REPORT: CPT | Performed by: INTERNAL MEDICINE

## 2021-04-24 PROCEDURE — 93308 TTE F-UP OR LMTD: CPT | Performed by: INTERNAL MEDICINE

## 2021-04-24 PROCEDURE — 63710000001 DEXAMETHASONE PER 0.25 MG: Performed by: PHYSICIAN ASSISTANT

## 2021-04-24 RX ORDER — DILTIAZEM HYDROCHLORIDE 5 MG/ML
INJECTION INTRAVENOUS
Status: COMPLETED
Start: 2021-04-24 | End: 2021-04-24

## 2021-04-24 RX ORDER — DILTIAZEM HYDROCHLORIDE 5 MG/ML
10 INJECTION INTRAVENOUS ONCE
Status: COMPLETED | OUTPATIENT
Start: 2021-04-24 | End: 2021-04-24

## 2021-04-24 RX ORDER — AMIODARONE HYDROCHLORIDE 200 MG/1
200 TABLET ORAL 2 TIMES DAILY
Status: DISCONTINUED | OUTPATIENT
Start: 2021-04-24 | End: 2021-04-25

## 2021-04-24 RX ADMIN — AMIODARONE HYDROCHLORIDE 150 MG: 1.5 INJECTION, SOLUTION INTRAVENOUS at 00:43

## 2021-04-24 RX ADMIN — SODIUM CHLORIDE 1000 ML: 9 INJECTION, SOLUTION INTRAVENOUS at 20:37

## 2021-04-24 RX ADMIN — Medication 10 ML: at 08:12

## 2021-04-24 RX ADMIN — Medication 1000 UNITS: at 08:13

## 2021-04-24 RX ADMIN — DILTIAZEM HYDROCHLORIDE 10 MG: 5 INJECTION INTRAVENOUS at 02:58

## 2021-04-24 RX ADMIN — LEVOTHYROXINE SODIUM 100 MCG: 0.1 TABLET ORAL at 06:04

## 2021-04-24 RX ADMIN — Medication 3 ML: at 08:12

## 2021-04-24 RX ADMIN — HEPARIN SODIUM 7500 UNITS: 5000 INJECTION INTRAVENOUS; SUBCUTANEOUS at 06:04

## 2021-04-24 RX ADMIN — HEPARIN SODIUM 7500 UNITS: 5000 INJECTION INTRAVENOUS; SUBCUTANEOUS at 14:46

## 2021-04-24 RX ADMIN — HEPARIN SODIUM 7500 UNITS: 5000 INJECTION INTRAVENOUS; SUBCUTANEOUS at 20:43

## 2021-04-24 RX ADMIN — SODIUM CHLORIDE 5 MG/HR: 900 INJECTION, SOLUTION INTRAVENOUS at 03:06

## 2021-04-24 RX ADMIN — OXYCODONE HYDROCHLORIDE AND ACETAMINOPHEN 500 MG: 500 TABLET ORAL at 08:13

## 2021-04-24 RX ADMIN — ATORVASTATIN CALCIUM 10 MG: 10 TABLET, FILM COATED ORAL at 08:13

## 2021-04-24 RX ADMIN — POTASSIUM CHLORIDE 20 MEQ: 1500 TABLET, EXTENDED RELEASE ORAL at 08:13

## 2021-04-24 RX ADMIN — FAMOTIDINE 20 MG: 20 TABLET ORAL at 08:12

## 2021-04-24 RX ADMIN — Medication 10 ML: at 20:37

## 2021-04-24 RX ADMIN — DEXAMETHASONE 4 MG: 4 TABLET ORAL at 08:13

## 2021-04-24 RX ADMIN — DILTIAZEM HYDROCHLORIDE 10 MG: 5 INJECTION INTRAVENOUS at 00:00

## 2021-04-25 LAB
ALBUMIN SERPL-MCNC: 3.3 G/DL (ref 3.5–5.2)
ALBUMIN/GLOB SERPL: 1.1 G/DL
ALP SERPL-CCNC: 31 U/L (ref 39–117)
ALT SERPL W P-5'-P-CCNC: 14 U/L (ref 1–33)
ANION GAP SERPL CALCULATED.3IONS-SCNC: 11 MMOL/L (ref 5–15)
AST SERPL-CCNC: 23 U/L (ref 1–32)
BASOPHILS # BLD AUTO: 0 10*3/MM3 (ref 0–0.2)
BASOPHILS NFR BLD AUTO: 0.3 % (ref 0–1.5)
BILIRUB SERPL-MCNC: 0.7 MG/DL (ref 0–1.2)
BUN SERPL-MCNC: 48 MG/DL (ref 8–23)
BUN/CREAT SERPL: 34.5 (ref 7–25)
CALCIUM SPEC-SCNC: 9.8 MG/DL (ref 8.6–10.5)
CHLORIDE SERPL-SCNC: 108 MMOL/L (ref 98–107)
CK SERPL-CCNC: 82 U/L (ref 20–180)
CO2 SERPL-SCNC: 23 MMOL/L (ref 22–29)
CREAT SERPL-MCNC: 1.39 MG/DL (ref 0.57–1)
CRP SERPL-MCNC: 0.45 MG/DL (ref 0–0.5)
D DIMER PPP FEU-MCNC: 2.23 MG/L (FEU) (ref 0–0.59)
DEPRECATED RDW RBC AUTO: 41.1 FL (ref 37–54)
EOSINOPHIL # BLD AUTO: 0 10*3/MM3 (ref 0–0.4)
EOSINOPHIL NFR BLD AUTO: 0 % (ref 0.3–6.2)
ERYTHROCYTE [DISTWIDTH] IN BLOOD BY AUTOMATED COUNT: 13.1 % (ref 12.3–15.4)
FERRITIN SERPL-MCNC: 1058 NG/ML (ref 13–150)
FIBRINOGEN PPP-MCNC: 297 MG/DL (ref 210–450)
GFR SERPL CREATININE-BSD FRML MDRD: 36 ML/MIN/1.73
GIANT PLATELETS: NORMAL
GLOBULIN UR ELPH-MCNC: 2.9 GM/DL
GLUCOSE SERPL-MCNC: 97 MG/DL (ref 65–99)
HCT VFR BLD AUTO: 36.6 % (ref 34–46.6)
HGB BLD-MCNC: 12.5 G/DL (ref 12–15.9)
LDH SERPL-CCNC: 356 U/L (ref 135–214)
LYMPHOCYTES # BLD AUTO: 0.9 10*3/MM3 (ref 0.7–3.1)
LYMPHOCYTES NFR BLD AUTO: 12.3 % (ref 19.6–45.3)
MCH RBC QN AUTO: 30.9 PG (ref 26.6–33)
MCHC RBC AUTO-ENTMCNC: 34 G/DL (ref 31.5–35.7)
MCV RBC AUTO: 90.9 FL (ref 79–97)
MONOCYTES # BLD AUTO: 1.1 10*3/MM3 (ref 0.1–0.9)
MONOCYTES NFR BLD AUTO: 14.5 % (ref 5–12)
NEUTROPHILS NFR BLD AUTO: 5.5 10*3/MM3 (ref 1.7–7)
NEUTROPHILS NFR BLD AUTO: 72.9 % (ref 42.7–76)
NRBC BLD AUTO-RTO: 0 /100 WBC (ref 0–0.2)
PLATELET # BLD AUTO: 142 10*3/MM3 (ref 140–450)
PMV BLD AUTO: 12 FL (ref 6–12)
POTASSIUM SERPL-SCNC: 4 MMOL/L (ref 3.5–5.2)
PROT SERPL-MCNC: 6.2 G/DL (ref 6–8.5)
RBC # BLD AUTO: 4.03 10*6/MM3 (ref 3.77–5.28)
RBC MORPH BLD: NORMAL
SODIUM SERPL-SCNC: 142 MMOL/L (ref 136–145)
WBC # BLD AUTO: 7.5 10*3/MM3 (ref 3.4–10.8)
WBC MORPH BLD: NORMAL

## 2021-04-25 PROCEDURE — 96375 TX/PRO/DX INJ NEW DRUG ADDON: CPT

## 2021-04-25 PROCEDURE — 85007 BL SMEAR W/DIFF WBC COUNT: CPT | Performed by: PHYSICIAN ASSISTANT

## 2021-04-25 PROCEDURE — 96372 THER/PROPH/DIAG INJ SC/IM: CPT

## 2021-04-25 PROCEDURE — 25010000002 HEPARIN (PORCINE) PER 1000 UNITS: Performed by: INTERNAL MEDICINE

## 2021-04-25 PROCEDURE — 80053 COMPREHEN METABOLIC PANEL: CPT | Performed by: INTERNAL MEDICINE

## 2021-04-25 PROCEDURE — 82728 ASSAY OF FERRITIN: CPT | Performed by: INTERNAL MEDICINE

## 2021-04-25 PROCEDURE — 83615 LACTATE (LD) (LDH) ENZYME: CPT | Performed by: INTERNAL MEDICINE

## 2021-04-25 PROCEDURE — 25010000002 HALOPERIDOL LACTATE PER 5 MG: Performed by: NURSE PRACTITIONER

## 2021-04-25 PROCEDURE — 96361 HYDRATE IV INFUSION ADD-ON: CPT

## 2021-04-25 PROCEDURE — 85384 FIBRINOGEN ACTIVITY: CPT | Performed by: INTERNAL MEDICINE

## 2021-04-25 PROCEDURE — 86140 C-REACTIVE PROTEIN: CPT | Performed by: INTERNAL MEDICINE

## 2021-04-25 PROCEDURE — 85025 COMPLETE CBC W/AUTO DIFF WBC: CPT | Performed by: PHYSICIAN ASSISTANT

## 2021-04-25 PROCEDURE — 99214 OFFICE O/P EST MOD 30 MIN: CPT | Performed by: INTERNAL MEDICINE

## 2021-04-25 PROCEDURE — 63710000001 DEXAMETHASONE PER 0.25 MG: Performed by: PHYSICIAN ASSISTANT

## 2021-04-25 PROCEDURE — 82550 ASSAY OF CK (CPK): CPT | Performed by: INTERNAL MEDICINE

## 2021-04-25 PROCEDURE — 85379 FIBRIN DEGRADATION QUANT: CPT | Performed by: INTERNAL MEDICINE

## 2021-04-25 PROCEDURE — G0378 HOSPITAL OBSERVATION PER HR: HCPCS

## 2021-04-25 PROCEDURE — 99225 PR SBSQ OBSERVATION CARE/DAY 25 MINUTES: CPT | Performed by: INTERNAL MEDICINE

## 2021-04-25 RX ORDER — HALOPERIDOL 5 MG/ML
2 INJECTION INTRAMUSCULAR ONCE
Status: COMPLETED | OUTPATIENT
Start: 2021-04-25 | End: 2021-04-25

## 2021-04-25 RX ADMIN — Medication 10 ML: at 08:25

## 2021-04-25 RX ADMIN — FAMOTIDINE 20 MG: 20 TABLET ORAL at 08:25

## 2021-04-25 RX ADMIN — HEPARIN SODIUM 7500 UNITS: 5000 INJECTION INTRAVENOUS; SUBCUTANEOUS at 14:51

## 2021-04-25 RX ADMIN — DILTIAZEM HYDROCHLORIDE 30 MG: 30 TABLET ORAL at 14:52

## 2021-04-25 RX ADMIN — DILTIAZEM HYDROCHLORIDE 30 MG: 30 TABLET ORAL at 22:51

## 2021-04-25 RX ADMIN — ATORVASTATIN CALCIUM 10 MG: 10 TABLET, FILM COATED ORAL at 08:25

## 2021-04-25 RX ADMIN — DEXAMETHASONE 4 MG: 4 TABLET ORAL at 08:25

## 2021-04-25 RX ADMIN — LEVOTHYROXINE SODIUM 100 MCG: 0.1 TABLET ORAL at 06:00

## 2021-04-25 RX ADMIN — Medication 1000 UNITS: at 08:25

## 2021-04-25 RX ADMIN — Medication 5 MG: at 22:51

## 2021-04-25 RX ADMIN — OXYCODONE HYDROCHLORIDE AND ACETAMINOPHEN 500 MG: 500 TABLET ORAL at 08:25

## 2021-04-25 RX ADMIN — POTASSIUM CHLORIDE 20 MEQ: 1500 TABLET, EXTENDED RELEASE ORAL at 08:26

## 2021-04-25 RX ADMIN — Medication 10 ML: at 22:53

## 2021-04-25 RX ADMIN — Medication 3 ML: at 08:26

## 2021-04-25 RX ADMIN — HALOPERIDOL LACTATE 2 MG: 5 INJECTION INTRAMUSCULAR at 01:10

## 2021-04-25 RX ADMIN — HEPARIN SODIUM 7500 UNITS: 5000 INJECTION INTRAVENOUS; SUBCUTANEOUS at 22:52

## 2021-04-25 RX ADMIN — HEPARIN SODIUM 7500 UNITS: 5000 INJECTION INTRAVENOUS; SUBCUTANEOUS at 06:00

## 2021-04-25 RX ADMIN — DILTIAZEM HYDROCHLORIDE 30 MG: 30 TABLET ORAL at 08:25

## 2021-04-26 ENCOUNTER — TELEPHONE (OUTPATIENT)
Dept: FAMILY MEDICINE CLINIC | Facility: CLINIC | Age: 86
End: 2021-04-26

## 2021-04-26 ENCOUNTER — APPOINTMENT (OUTPATIENT)
Dept: RESPIRATORY THERAPY | Facility: HOSPITAL | Age: 86
End: 2021-04-26

## 2021-04-26 ENCOUNTER — READMISSION MANAGEMENT (OUTPATIENT)
Dept: CALL CENTER | Facility: HOSPITAL | Age: 86
End: 2021-04-26

## 2021-04-26 VITALS
TEMPERATURE: 97 F | HEIGHT: 65 IN | DIASTOLIC BLOOD PRESSURE: 74 MMHG | HEART RATE: 72 BPM | SYSTOLIC BLOOD PRESSURE: 144 MMHG | BODY MASS INDEX: 25.12 KG/M2 | WEIGHT: 150.79 LBS | OXYGEN SATURATION: 95 % | RESPIRATION RATE: 20 BRPM

## 2021-04-26 PROBLEM — I48.0 PAROXYSMAL ATRIAL FIBRILLATION: Status: ACTIVE | Noted: 2021-04-26

## 2021-04-26 LAB
ALBUMIN SERPL-MCNC: 3.7 G/DL (ref 3.5–5.2)
ALBUMIN/GLOB SERPL: 1.3 G/DL
ALP SERPL-CCNC: 29 U/L (ref 39–117)
ALT SERPL W P-5'-P-CCNC: 19 U/L (ref 1–33)
ANION GAP SERPL CALCULATED.3IONS-SCNC: 12 MMOL/L (ref 5–15)
AST SERPL-CCNC: 24 U/L (ref 1–32)
BACTERIA SPEC AEROBE CULT: NORMAL
BACTERIA SPEC AEROBE CULT: NORMAL
BASOPHILS # BLD AUTO: 0.1 10*3/MM3 (ref 0–0.2)
BASOPHILS NFR BLD AUTO: 0.7 % (ref 0–1.5)
BILIRUB SERPL-MCNC: 0.9 MG/DL (ref 0–1.2)
BUN SERPL-MCNC: 39 MG/DL (ref 8–23)
BUN/CREAT SERPL: 31 (ref 7–25)
CALCIUM SPEC-SCNC: 10.4 MG/DL (ref 8.6–10.5)
CHLORIDE SERPL-SCNC: 108 MMOL/L (ref 98–107)
CK SERPL-CCNC: 53 U/L (ref 20–180)
CO2 SERPL-SCNC: 23 MMOL/L (ref 22–29)
CREAT SERPL-MCNC: 1.26 MG/DL (ref 0.57–1)
CRP SERPL-MCNC: <0.3 MG/DL (ref 0–0.5)
DEPRECATED RDW RBC AUTO: 41.6 FL (ref 37–54)
EOSINOPHIL # BLD AUTO: 0 10*3/MM3 (ref 0–0.4)
EOSINOPHIL NFR BLD AUTO: 0 % (ref 0.3–6.2)
ERYTHROCYTE [DISTWIDTH] IN BLOOD BY AUTOMATED COUNT: 13 % (ref 12.3–15.4)
FERRITIN SERPL-MCNC: 1434 NG/ML (ref 13–150)
GFR SERPL CREATININE-BSD FRML MDRD: 41 ML/MIN/1.73
GLOBULIN UR ELPH-MCNC: 2.8 GM/DL
GLUCOSE SERPL-MCNC: 101 MG/DL (ref 65–99)
HCT VFR BLD AUTO: 38.8 % (ref 34–46.6)
HGB BLD-MCNC: 13.3 G/DL (ref 12–15.9)
LARGE PLATELETS: NORMAL
LYMPHOCYTES # BLD AUTO: 1.6 10*3/MM3 (ref 0.7–3.1)
LYMPHOCYTES NFR BLD AUTO: 15.1 % (ref 19.6–45.3)
MCH RBC QN AUTO: 31.5 PG (ref 26.6–33)
MCHC RBC AUTO-ENTMCNC: 34.2 G/DL (ref 31.5–35.7)
MCV RBC AUTO: 92.1 FL (ref 79–97)
MONOCYTES # BLD AUTO: 1.3 10*3/MM3 (ref 0.1–0.9)
MONOCYTES NFR BLD AUTO: 12.2 % (ref 5–12)
NEUTROPHILS NFR BLD AUTO: 7.8 10*3/MM3 (ref 1.7–7)
NEUTROPHILS NFR BLD AUTO: 72 % (ref 42.7–76)
NRBC BLD AUTO-RTO: 0.1 /100 WBC (ref 0–0.2)
PLATELET # BLD AUTO: 160 10*3/MM3 (ref 140–450)
PMV BLD AUTO: 12.5 FL (ref 6–12)
POTASSIUM SERPL-SCNC: 4 MMOL/L (ref 3.5–5.2)
PROT SERPL-MCNC: 6.5 G/DL (ref 6–8.5)
QT INTERVAL: 325 MS
QT INTERVAL: 436 MS
RBC # BLD AUTO: 4.22 10*6/MM3 (ref 3.77–5.28)
RBC MORPH BLD: NORMAL
SODIUM SERPL-SCNC: 143 MMOL/L (ref 136–145)
WBC # BLD AUTO: 10.8 10*3/MM3 (ref 3.4–10.8)
WBC MORPH BLD: NORMAL

## 2021-04-26 PROCEDURE — 25010000002 HEPARIN (PORCINE) PER 1000 UNITS: Performed by: INTERNAL MEDICINE

## 2021-04-26 PROCEDURE — 99217 PR OBSERVATION CARE DISCHARGE MANAGEMENT: CPT | Performed by: INTERNAL MEDICINE

## 2021-04-26 PROCEDURE — 82728 ASSAY OF FERRITIN: CPT | Performed by: INTERNAL MEDICINE

## 2021-04-26 PROCEDURE — 82550 ASSAY OF CK (CPK): CPT | Performed by: INTERNAL MEDICINE

## 2021-04-26 PROCEDURE — 96372 THER/PROPH/DIAG INJ SC/IM: CPT

## 2021-04-26 PROCEDURE — 63710000001 DEXAMETHASONE PER 0.25 MG: Performed by: PHYSICIAN ASSISTANT

## 2021-04-26 PROCEDURE — 85007 BL SMEAR W/DIFF WBC COUNT: CPT | Performed by: PHYSICIAN ASSISTANT

## 2021-04-26 PROCEDURE — G0378 HOSPITAL OBSERVATION PER HR: HCPCS

## 2021-04-26 PROCEDURE — 85025 COMPLETE CBC W/AUTO DIFF WBC: CPT | Performed by: PHYSICIAN ASSISTANT

## 2021-04-26 PROCEDURE — 86140 C-REACTIVE PROTEIN: CPT | Performed by: INTERNAL MEDICINE

## 2021-04-26 PROCEDURE — 99213 OFFICE O/P EST LOW 20 MIN: CPT | Performed by: INTERNAL MEDICINE

## 2021-04-26 PROCEDURE — 80053 COMPREHEN METABOLIC PANEL: CPT | Performed by: INTERNAL MEDICINE

## 2021-04-26 RX ORDER — MELATONIN
1000 DAILY
Start: 2021-04-27

## 2021-04-26 RX ORDER — DILTIAZEM HYDROCHLORIDE 180 MG/1
180 CAPSULE, COATED, EXTENDED RELEASE ORAL
Status: DISCONTINUED | OUTPATIENT
Start: 2021-04-26 | End: 2021-04-26

## 2021-04-26 RX ORDER — DILTIAZEM HYDROCHLORIDE 120 MG/1
120 CAPSULE, COATED, EXTENDED RELEASE ORAL
Qty: 30 CAPSULE | Refills: 0 | Status: SHIPPED | OUTPATIENT
Start: 2021-04-27 | End: 2021-05-11 | Stop reason: SDUPTHER

## 2021-04-26 RX ORDER — DILTIAZEM HYDROCHLORIDE 120 MG/1
120 CAPSULE, COATED, EXTENDED RELEASE ORAL
Status: DISCONTINUED | OUTPATIENT
Start: 2021-04-26 | End: 2021-04-26 | Stop reason: HOSPADM

## 2021-04-26 RX ORDER — ASCORBIC ACID 500 MG
500 TABLET ORAL DAILY
Start: 2021-04-27 | End: 2021-05-11

## 2021-04-26 RX ADMIN — Medication 1000 UNITS: at 08:42

## 2021-04-26 RX ADMIN — HEPARIN SODIUM 7500 UNITS: 5000 INJECTION INTRAVENOUS; SUBCUTANEOUS at 06:34

## 2021-04-26 RX ADMIN — DILTIAZEM HYDROCHLORIDE 30 MG: 30 TABLET ORAL at 06:34

## 2021-04-26 RX ADMIN — Medication 10 ML: at 08:46

## 2021-04-26 RX ADMIN — OXYCODONE HYDROCHLORIDE AND ACETAMINOPHEN 500 MG: 500 TABLET ORAL at 08:42

## 2021-04-26 RX ADMIN — ATORVASTATIN CALCIUM 10 MG: 10 TABLET, FILM COATED ORAL at 08:42

## 2021-04-26 RX ADMIN — DEXAMETHASONE 4 MG: 4 TABLET ORAL at 08:51

## 2021-04-26 RX ADMIN — FAMOTIDINE 20 MG: 20 TABLET ORAL at 08:42

## 2021-04-26 RX ADMIN — POTASSIUM CHLORIDE 20 MEQ: 1500 TABLET, EXTENDED RELEASE ORAL at 08:42

## 2021-04-26 NOTE — OUTREACH NOTE
Prep Survey      Responses   Religion facility patient discharged from?  [facility]   Is LACE score < 7 ?  Yes   Emergency Room discharge w/ pulse ox?  No   Eligibility  [facility]   Date of Admission  04/21/21   Date of Discharge  04/26/21   [place] Disposition  [place] [facility]   Discharge diagnosis  Acute kidney injury Pneumonia due to COVID-19    Does the patient have one of the following disease processes/diagnoses(primary or secondary)?  COVID-19   Does the patient have Home health ordered?  Yes   What is the Home health agency?   [facility]   Is there a DME ordered?  No   Prep survey completed?  Yes          Yolanda Paez RN

## 2021-04-26 NOTE — TELEPHONE ENCOUNTER
Caller: GEOVANI    Relationship to patient: Other (Hindu YESY NURSE)    Best call back number: [phone]    New or established patient?  [] New  [x] Established    Date of discharge: 04/26/2021    Facility discharged from: [facility]    Diagnosis/Symptoms: COVID-19 POSITIVE    Length of stay (If applicable): 5 DAYS    THE PATIENT'S PCP WAS NOT AVAILABLE WITHIN THE 7-DAY TIME FRAME NEEDED, SO THE PATIENT WAS SCHEDULED WITH DR. JEWELL INSTEAD.

## 2021-04-27 ENCOUNTER — TRANSITIONAL CARE MANAGEMENT TELEPHONE ENCOUNTER (OUTPATIENT)
Dept: CALL CENTER | Facility: HOSPITAL | Age: 86
End: 2021-04-27

## 2021-04-27 NOTE — OUTREACH NOTE
Call Center TCM Note      Responses   [facility] patient discharged from?  [facility]   Does the patient have one of the following disease processes/diagnoses(primary or secondary)?  COVID-19   COVID-19 underlying condition?  None   TCM attempt successful?  Yes   Call start time  1551   Call end time  1600   Discharge diagnosis  Acute kidney injury Pneumonia due to COVID-19    Is patient permission given to speak with other caregiver?  Yes   List who call center can speak with  Kylah-daughter    Person spoke with today (if not patient) and relationship  Patient and Kylah-daughter    Meds reviewed with patient/caregiver?  Yes   Is the patient having any side effects they believe may be caused by any medication additions or changes?  No   Does the patient have all medications ordered at discharge?  Yes   Is the patient taking all medications as directed (includes completed medication regime)?  Yes   Comments regarding appointments  Appt with thiago Zimmerman, is on 5/11/21   Does the patient have a primary care provider?   Yes   Comments regarding PCP  Hospital d/c f/u appt is on 5/6/21 at 11:30 am    Does the patient have an appointment with their PCP or specialist within 7 days of discharge?  Greater than 7 days   What is preventing the patient from scheduling follow up appointments within 7 days of discharge?  -- [Pt tested positive with COVID 4/21/21]   Nursing Interventions  Verified appointment date/time/provider   Has the patient kept scheduled appointments due by today?  N/A   What is the Home health agency?   [facility]   Has home health visited the patient within 72 hours of discharge?  Call prior to 72 hours   Home health comments   should visit on 4/28/21   What DME was ordered?  Pt is wearing a heart monitor.    Psychosocial issues?  No   Did the patient receive a copy of their discharge instructions?  Yes   Did the patient receive a copy of COVID-19 specific instructions?  Yes   Nursing interventions   Reviewed instructions with patient   What is the patient's perception of their health status since discharge?  Improving   Does the patient have any of the following symptoms?  Cough [Pt had cough prior to COVID ]   Nursing Interventions  Nurse provided patient education   Pulse Ox monitoring  None   Is the patient/caregiver able to teach back steps to recovery at home?  Eat a well-balance diet, Rest and rebuild strength, gradually increase activity   If the patient is a current smoker, are they able to teach back resources for cessation?  Not a smoker   Is the patient/caregiver able to teach back the hierarchy of who to call/visit for symptoms/problems? PCP, Specialist, Home health nurse, Urgent Care, ED, 911  Yes   TCM call completed?  Yes          Karlene Greenberg RN    4/27/2021, 16:00 EDT

## 2021-04-28 ENCOUNTER — READMISSION MANAGEMENT (OUTPATIENT)
Dept: CALL CENTER | Facility: HOSPITAL | Age: 86
End: 2021-04-28

## 2021-04-28 NOTE — OUTREACH NOTE
COVID-19 Week 1 Survey      Responses   [facility] patient discharged from?  [facility]   Does the patient have one of the following disease processes/diagnoses(primary or secondary)?  COVID-19   COVID-19 underlying condition?  None   Call Number  Call 2   Week 1 Call successful?  Yes   Call start time  1321   Call end time  1327   Discharge diagnosis  Acute kidney injury Pneumonia due to COVID-19    List who call center can speak with  Martín    Meds reviewed with patient/caregiver?  Yes   Is the patient having any side effects they believe may be caused by any medication additions or changes?  No   Does the patient have all medications ordered at discharge?  Yes   Is the patient taking all medications as directed (includes completed medication regime)?  Yes   Does the patient have a primary care provider?   Yes   Does the patient have an appointment with their PCP or specialist within 7 days of discharge?  Greater than 7 days   What is preventing the patient from scheduling follow up appointments within 7 days of discharge?  Earlier appointment not available   Nursing Interventions  Verified appointment date/time/provider   Has the patient kept scheduled appointments due by today?  N/A   What is the Home health agency?   [facility]   Has home health visited the patient within 72 hours of discharge?  Yes   Psychosocial issues?  No   Did the patient receive a copy of their discharge instructions?  Yes   Did the patient receive a copy of COVID-19 specific instructions?  Yes   Nursing interventions  Reviewed instructions with patient   What is the patient's perception of their health status since discharge?  Improving   Does the patient have any of the following symptoms?  Cough [intermittently cough]   Nursing Interventions  Nurse provided patient education   Pulse Ox monitoring  None   Is the patient/caregiver able to teach back steps to recovery at home?  Set small, achievable goals for return to baseline  health, Rest and rebuild strength, gradually increase activity, Eat a well-balance diet   Is the patient/caregiver able to teach back the hierarchy of who to call/visit for symptoms/problems? PCP, Specialist, Home health nurse, Urgent Care, ED, 911  Yes   COVID-19 call completed?  Yes          Amalia Stephenson RN

## 2021-04-29 ENCOUNTER — TELEPHONE (OUTPATIENT)
Dept: FAMILY MEDICINE CLINIC | Facility: CLINIC | Age: 86
End: 2021-04-29

## 2021-04-29 ENCOUNTER — READMISSION MANAGEMENT (OUTPATIENT)
Dept: CALL CENTER | Facility: HOSPITAL | Age: 86
End: 2021-04-29

## 2021-04-29 ENCOUNTER — TELEPHONE (OUTPATIENT)
Dept: CARDIOLOGY | Facility: CLINIC | Age: 86
End: 2021-04-29

## 2021-04-29 NOTE — TELEPHONE ENCOUNTER
Caller: GONZALEZ MCLAIN-NOT ON  VERBAL    Relationship: Emergency Contact    Best call back number: [phone]    What medications are you currently taking:   Current Outpatient Medications on File Prior to Visit   Medication Sig Dispense Refill   • acetaminophen (TYLENOL) 500 MG tablet Take 500 mg by mouth Every 6 (Six) Hours As Needed for Mild Pain .     • ascorbic acid (VITAMIN C) 500 MG tablet Take 1 tablet by mouth Daily.     • cholecalciferol (VITAMIN D3) 25 MCG (1000 UT) tablet Take 1 tablet by mouth Daily.     • dilTIAZem CD (CARDIZEM CD) 120 MG 24 hr capsule Take 1 capsule by mouth Daily. 30 capsule 0   • Glucosamine HCl (GLUCOSAMINE PO) Take 1 capsule by mouth Daily.     • Omega-3 Fatty Acids (FISH OIL PO) Take 1 capsule by mouth Daily.     • potassium chloride (K-DUR,KLOR-CON) 20 MEQ CR tablet TAKE 1 TABLET EVERY DAY 90 tablet 3   • pravastatin (PRAVACHOL) 40 MG tablet TAKE 1 TABLET EVERY DAY 90 tablet 3   • Synthroid 100 MCG tablet TAKE 1 TABLET EVERY DAY 90 tablet 3     No current facility-administered medications on file prior to visit.        When did you start taking these medications: CALLER COULD NOT GIVE EXACT TIME    Which medication are you concerned about: POTASSIUM CHLORIDE-    Who prescribed you this medication: PCP    What are your concerns: PATIENT IS HAVING HARD TIME SWALLOWING THE PILL AND ALSO WANTS TO KNOW WHY PATIENT IS TAKING THIS MEDICATION    How long have you been taking these medications: CALLER DID NOT KNOW    How long have you had these concerns: SINCE GETTING OUT OF HOSPITAL LAST WEEK

## 2021-04-29 NOTE — OUTREACH NOTE
COVID-19 Week 1 Survey      Responses   [facility] patient discharged from?  [facility]   Does the patient have one of the following disease processes/diagnoses(primary or secondary)?  COVID-19   COVID-19 underlying condition?  None   Call Number  Call 3   Week 1 Call successful?  Yes   Call start time  0950   Call end time  0957   Discharge diagnosis  Acute kidney injury Pneumonia due to COVID-19    Is patient permission given to speak with other caregiver?  Yes   List who call center can speak with  Kylah-daughter    Person spoke with today (if not patient) and relationship  Patient and Kylah-daughter    Is the patient taking all medications as directed (includes completed medication regime)?  Yes   Comments  ziopatch in place--daughter will contact cards about it as it came off this morning   Has all DME been delivered?  Yes   Psychosocial issues?  No   Does the patient have any of the following symptoms?  Cough   Nursing Interventions  Nurse provided patient education   Pulse Ox monitoring  None   Is the patient/caregiver able to teach back the hierarchy of who to call/visit for symptoms/problems? PCP, Specialist, Home health nurse, Urgent Care, ED, 911  Yes   COVID-19 call completed?  Yes   Wrap up additional comments  Enc fluids and small, protein rich meals.          Jade Casanova RN

## 2021-04-29 NOTE — TELEPHONE ENCOUNTER
Patient discharged on potassium because her potassium was low when she admitted.  If she is unable to swallow the pill I would recommend eating more banana and diet rich in potassium.

## 2021-04-29 NOTE — TELEPHONE ENCOUNTER
Kylah (pt's daughter) called back to report that the monitor only stayed on for 1-2 day.  Kylah reports that pt will not leave anything along and she doesn't think pt will keep one on anyway. Kylah wants to wait to talk to Dr. Johnson on 5/11 before they have another one placed. Sindi

## 2021-04-29 NOTE — TELEPHONE ENCOUNTER
Alex PT    PT had ZIO placed in the hospital. She took a shower and it came off, it will no longer stick so they took it off. PT is asking what does DR hartley want to do? F/U APT is 5/11/21.

## 2021-05-05 ENCOUNTER — READMISSION MANAGEMENT (OUTPATIENT)
Dept: CALL CENTER | Facility: HOSPITAL | Age: 86
End: 2021-05-05

## 2021-05-05 NOTE — OUTREACH NOTE
COVID-19 Week 2 Survey      Responses   [facility] patient discharged from?  [facility]   Does the patient have one of the following disease processes/diagnoses(primary or secondary)?  COVID-19   COVID-19 underlying condition?  None   Call Number  Call 1   COVID-19 Week 2: Call 1 attempt successful?  Yes   Call start time  1123   Call end time  1125   Is patient permission given to speak with other caregiver?  Yes   Person spoke with today (if not patient) and relationship  Kylah-daughter   Meds reviewed with patient/caregiver?  Yes   Is the patient having any side effects they believe may be caused by any medication additions or changes?  No   Does the patient have all medications ordered at discharge?  Yes   Is the patient taking all medications as directed (includes completed medication regime)?  Yes   Does the patient have a primary care provider?   Yes   Does the patient have an appointment with their PCP or specialist within 7 days of discharge?  Yes   Has the patient kept scheduled appointments due by today?  N/A   Comments  Daughter states cardiologist advised to leave zio patch off for now.   Has home health visited the patient within 72 hours of discharge?  Yes   Has all DME been delivered?  Yes   Psychosocial issues?  No   What is the patient's perception of their health status since discharge?  Improving   Does the patient have any of the following symptoms?  Cough   Pulse Ox monitoring  None   Is the patient/caregiver able to teach back the hierarchy of who to call/visit for symptoms/problems? PCP, Specialist, Home health nurse, Urgent Care, ED, 911  Yes   COVID-19 call completed?  Yes   Wrap up additional comments  Daughter states patient is improving-good appetite. Denies any needs today.          Lina Rivera RN

## 2021-05-06 ENCOUNTER — LAB (OUTPATIENT)
Dept: FAMILY MEDICINE CLINIC | Facility: CLINIC | Age: 86
End: 2021-05-06

## 2021-05-06 ENCOUNTER — OFFICE VISIT (OUTPATIENT)
Dept: FAMILY MEDICINE CLINIC | Facility: CLINIC | Age: 86
End: 2021-05-06

## 2021-05-06 VITALS
BODY MASS INDEX: 25.33 KG/M2 | WEIGHT: 152 LBS | HEART RATE: 69 BPM | TEMPERATURE: 97.3 F | OXYGEN SATURATION: 92 % | SYSTOLIC BLOOD PRESSURE: 135 MMHG | DIASTOLIC BLOOD PRESSURE: 83 MMHG | HEIGHT: 65 IN

## 2021-05-06 DIAGNOSIS — I10 ESSENTIAL HYPERTENSION: ICD-10-CM

## 2021-05-06 DIAGNOSIS — E78.2 MIXED HYPERLIPIDEMIA: ICD-10-CM

## 2021-05-06 DIAGNOSIS — J12.82 PNEUMONIA DUE TO COVID-19 VIRUS: Primary | ICD-10-CM

## 2021-05-06 DIAGNOSIS — U07.1 PNEUMONIA DUE TO COVID-19 VIRUS: Primary | ICD-10-CM

## 2021-05-06 LAB
ALBUMIN SERPL-MCNC: 3.4 G/DL (ref 3.5–5.2)
ALBUMIN/GLOB SERPL: 1.1 G/DL
ALP SERPL-CCNC: 42 U/L (ref 39–117)
ALT SERPL W P-5'-P-CCNC: 8 U/L (ref 1–33)
ANION GAP SERPL CALCULATED.3IONS-SCNC: 8.9 MMOL/L (ref 5–15)
AST SERPL-CCNC: 13 U/L (ref 1–32)
BILIRUB SERPL-MCNC: 0.9 MG/DL (ref 0–1.2)
BUN SERPL-MCNC: 16 MG/DL (ref 8–23)
BUN/CREAT SERPL: 16.3 (ref 7–25)
CALCIUM SPEC-SCNC: 10 MG/DL (ref 8.6–10.5)
CHLORIDE SERPL-SCNC: 102 MMOL/L (ref 98–107)
CHOLEST SERPL-MCNC: 152 MG/DL (ref 0–200)
CO2 SERPL-SCNC: 26.1 MMOL/L (ref 22–29)
CREAT SERPL-MCNC: 0.98 MG/DL (ref 0.57–1)
GFR SERPL CREATININE-BSD FRML MDRD: 54 ML/MIN/1.73
GLOBULIN UR ELPH-MCNC: 3 GM/DL
GLUCOSE SERPL-MCNC: 88 MG/DL (ref 65–99)
HDLC SERPL-MCNC: 39 MG/DL (ref 40–60)
LDLC SERPL CALC-MCNC: 92 MG/DL (ref 0–100)
LDLC/HDLC SERPL: 2.32 {RATIO}
POTASSIUM SERPL-SCNC: 3.6 MMOL/L (ref 3.5–5.2)
PROT SERPL-MCNC: 6.4 G/DL (ref 6–8.5)
SODIUM SERPL-SCNC: 137 MMOL/L (ref 136–145)
TRIGL SERPL-MCNC: 113 MG/DL (ref 0–150)
VLDLC SERPL-MCNC: 21 MG/DL (ref 5–40)

## 2021-05-06 PROCEDURE — 36415 COLL VENOUS BLD VENIPUNCTURE: CPT

## 2021-05-06 PROCEDURE — 80061 LIPID PANEL: CPT | Performed by: FAMILY MEDICINE

## 2021-05-06 PROCEDURE — 80053 COMPREHEN METABOLIC PANEL: CPT | Performed by: FAMILY MEDICINE

## 2021-05-06 PROCEDURE — 99495 TRANSJ CARE MGMT MOD F2F 14D: CPT | Performed by: FAMILY MEDICINE

## 2021-05-11 ENCOUNTER — OFFICE VISIT (OUTPATIENT)
Dept: CARDIOLOGY | Facility: CLINIC | Age: 86
End: 2021-05-11

## 2021-05-11 VITALS
SYSTOLIC BLOOD PRESSURE: 132 MMHG | BODY MASS INDEX: 25.79 KG/M2 | WEIGHT: 154.8 LBS | HEART RATE: 58 BPM | HEIGHT: 65 IN | DIASTOLIC BLOOD PRESSURE: 78 MMHG | RESPIRATION RATE: 18 BRPM

## 2021-05-11 DIAGNOSIS — I48.0 PAROXYSMAL ATRIAL FIBRILLATION (HCC): ICD-10-CM

## 2021-05-11 DIAGNOSIS — E78.2 MIXED HYPERLIPIDEMIA: ICD-10-CM

## 2021-05-11 DIAGNOSIS — I10 ESSENTIAL HYPERTENSION: Primary | ICD-10-CM

## 2021-05-11 PROCEDURE — 99213 OFFICE O/P EST LOW 20 MIN: CPT | Performed by: INTERNAL MEDICINE

## 2021-05-11 RX ORDER — FUROSEMIDE 20 MG/1
20 TABLET ORAL DAILY PRN
Qty: 30 TABLET | Refills: 6 | Status: SHIPPED | OUTPATIENT
Start: 2021-05-11 | End: 2021-06-23 | Stop reason: SDUPTHER

## 2021-05-11 RX ORDER — DILTIAZEM HYDROCHLORIDE 120 MG/1
120 CAPSULE, COATED, EXTENDED RELEASE ORAL
Qty: 90 CAPSULE | Refills: 3 | Status: SHIPPED | OUTPATIENT
Start: 2021-05-11 | End: 2021-06-23 | Stop reason: SDUPTHER

## 2021-05-11 RX ORDER — ASPIRIN 81 MG/1
81 TABLET ORAL DAILY
Qty: 30 TABLET | Refills: 11 | Status: SHIPPED | OUTPATIENT
Start: 2021-05-11 | End: 2022-08-02

## 2021-05-11 NOTE — PROGRESS NOTES
Cardiology clinic note  Surya Johnson MD, PhD  Subjective:     Encounter Date:05/11/2021      Patient ID: Aparna Matson is a 85 y.o. female.    Chief Complaint:  Chief Complaint   Patient presents with   • Hospital Follow Up Visit       HPI:  History of Present Illness  At the pleasure to see this 85-year-old female in clinic he was hospitalized with Covid pneumonia and had an episode of A. fib RVR which quickly converted to sinus rhythm on diltiazem drip not requiring antiarrhythmic therapy.  She was changed ultimately to long-acting diltiazem and her amlodipine stopped as this would cover both antihypertensive and antiarrhythmic therapy.  She remains in sinus rhythm today with heart rates of 58-62 regular on my encounter.  She is well compensated.  She has preserved LV systolic function, she is on an aspirin daily and with otherwise debility was presumed to be increased bleeding risk from falls and she was not started on systemic anticoagulation, she was only in atrial fibrillation a few hours.  She has no prior history of this, no #prior history of MI or stroke although she does have history of iliac artery aneurysm essential hypertension hyperlipidemia.  No other complaints today.  No chest pain or shortness of breath she is doing well after hospitalization    Review of systems negative x14 point review of systems except as mentioned above    Historical data copied forward from previous encounters numerous unchanged    The following portions of the patient's history were reviewed and updated as appropriate: allergies, current medications, past family history, past medical history, past social history, past surgical history and problem list.    Problem List:  Patient Active Problem List   Diagnosis   • Hypertension   • Hyperlipidemia   • Hypothyroidism   • Pneumonia due to COVID-19 virus   • Abnormal blood chemistry level   • Aneurysm of iliac artery (CMS/HCC)   • Blepharitis   • Breast cyst, right   • Ductal  carcinoma in situ (DCIS) of breast   • Hearing loss   • Hx of mastectomy, right   • Lens replaced by other means   • Macular degeneration (senile) of retina   • Memory impairment   • Postmenopausal status   • Senile nuclear sclerosis   • Varicose veins of lower extremity   • Acute kidney injury (CMS/HCC)   • Paroxysmal atrial fibrillation (CMS/HCC)       Past Medical History:  Past Medical History:   Diagnosis Date   • Abnormal mammogram of right breast    • Breast cyst, right    • Breast lump     BREAST LUMPS   • Chickenpox    • Dizziness    • Ductal carcinoma in situ of right breast    • Elevated serum creatinine    • Hearing loss    • Hemorrhoids    • Hyperlipidemia    • Hypertension    • Hypothyroidism    • Iliac artery aneurysm, left (CMS/HCC)    • Knee pain, left    • Leg cramps    • Leg pain, left    • Mass of breast, right    • Measles    • Memory impairment    • Mumps    • Other specified local infections of the skin and subcutaneous tissue     SORE AT LEFT ANKLE    • Otitis media, acute     RIGHT    • Postmenopausal status    • Sinus disorder     SINUS   • Skin lesion of face     LEFT TEMPLE    • Skin lesion of wrist     LEFT FOREARM   • Varicose veins of both lower extremities    • Whooping cough        Past Surgical History:  Past Surgical History:   Procedure Laterality Date   • BREAST LUMPECTOMY Left 1994   • HYSTERECTOMY     • MASTECTOMY Right 09/25/2017    DR. FUNG   • THYROIDECTOMY, PARTIAL  1961       Social History:  Social History     Socioeconomic History   • Marital status:      Spouse name: Not on file   • Number of children: Not on file   • Years of education: Not on file   • Highest education level: Not on file   Tobacco Use   • Smoking status: Never Smoker   • Smokeless tobacco: Never Used   Substance and Sexual Activity   • Alcohol use: Not Currently   • Drug use: Not Currently   • Sexual activity: Defer       Allergies:  Allergies   Allergen Reactions   • Codeine Hallucinations  "      Immunizations:    There is no immunization history on file for this patient.    ROS:  ROS       Objective:         /78 (BP Location: Left arm, Patient Position: Sitting)   Pulse 58   Resp 18   Ht 165.1 cm (65\")   Wt 70.2 kg (154 lb 12.8 oz)   BMI 25.76 kg/m²     Physical Exam  Regular rate and rhythm no rubs gallops no heave no lift 106 stock ejection murmur unchanged from prior encounter  Left lower extremity pitting edema at the ankle 1+, trace right  Minimal JVD HJR  Clear to auscultation diminished bases clears with deep inspiration  No clubbing cyanosis  Varicosities present peripherally  No bruits  In-Office Procedure(s):  Procedures    ASCVD RIsk Score::  The ASCVD Risk score (Moi GARCIA Jr., et al., 2013) failed to calculate for the following reasons:    The 2013 ASCVD risk score is only valid for ages 40 to 79    Recent Radiology:  Imaging Results (Most Recent)     None          Lab Review:   Lab on 05/06/2021   Component Date Value   • Total Cholesterol 05/06/2021 152    • Triglycerides 05/06/2021 113    • HDL Cholesterol 05/06/2021 39*   • LDL Cholesterol  05/06/2021 92    • VLDL Cholesterol 05/06/2021 21    • LDL/HDL Ratio 05/06/2021 2.32    • Glucose 05/06/2021 88    • BUN 05/06/2021 16    • Creatinine 05/06/2021 0.98    • Sodium 05/06/2021 137    • Potassium 05/06/2021 3.6    • Chloride 05/06/2021 102    • CO2 05/06/2021 26.1    • Calcium 05/06/2021 10.0    • Total Protein 05/06/2021 6.4    • Albumin 05/06/2021 3.40*   • ALT (SGPT) 05/06/2021 8    • AST (SGOT) 05/06/2021 13    • Alkaline Phosphatase 05/06/2021 42    • Total Bilirubin 05/06/2021 0.9    • eGFR Non African Amer 05/06/2021 54*   • Globulin 05/06/2021 3.0    • A/G Ratio 05/06/2021 1.1    • BUN/Creatinine Ratio 05/06/2021 16.3    • Anion Gap 05/06/2021 8.9    No results displayed because visit has over 200 results.                   Assessment:         No diagnosis found.  Paroxysmal A. fib  Essential " pretension  Hyperlipidemia     Plan:      Acute kidney injury (CMS/HCC)    Hypertension    Hyperlipidemia    Hypothyroidism    Pneumonia due to COVID-19 virus     1) Afib, paroxysmal, no recurrence, occurred in the setting of Covid pneumonia  -Remains with sinus rhythm 60s  - 2D echo 4/24 showed EF = 61-65%  -Recommend aspirin 81 mg daily  If any recurrence may need anticoagulation  Continue diltiazem extended release at present dose       2) COVID PNA, improved     3) HTN, stable 130 systolic     4) HLD, stable    #5 lower extremity edema, trace, as needed Lasix 20 daily    See back in 6 months  Primary care in the interim    Surya Johnson MD, PhD         Surya Johnson MD  05/11/21  .

## 2021-05-11 NOTE — ASSESSMENT & PLAN NOTE
Hypertension is improving with treatment.  Dietary sodium restriction.  Regular aerobic exercise.  Continue current medications.  Blood pressure will be reassessed in 3 months.

## 2021-06-06 RX ORDER — LOSARTAN POTASSIUM AND HYDROCHLOROTHIAZIDE 12.5; 1 MG/1; MG/1
TABLET ORAL
Qty: 90 TABLET | Refills: 3 | Status: SHIPPED | OUTPATIENT
Start: 2021-06-06 | End: 2022-03-10

## 2021-06-25 RX ORDER — DILTIAZEM HYDROCHLORIDE 120 MG/1
120 CAPSULE, COATED, EXTENDED RELEASE ORAL
Qty: 90 CAPSULE | Refills: 3 | Status: SHIPPED | OUTPATIENT
Start: 2021-06-25 | End: 2022-05-04

## 2021-06-25 RX ORDER — FUROSEMIDE 20 MG/1
20 TABLET ORAL DAILY PRN
Qty: 90 TABLET | Refills: 1 | Status: SHIPPED | OUTPATIENT
Start: 2021-06-25 | End: 2021-11-10

## 2021-08-10 ENCOUNTER — OFFICE VISIT (OUTPATIENT)
Dept: FAMILY MEDICINE CLINIC | Facility: CLINIC | Age: 86
End: 2021-08-10

## 2021-08-10 ENCOUNTER — LAB (OUTPATIENT)
Dept: FAMILY MEDICINE CLINIC | Facility: CLINIC | Age: 86
End: 2021-08-10

## 2021-08-10 VITALS
TEMPERATURE: 97.5 F | HEART RATE: 80 BPM | SYSTOLIC BLOOD PRESSURE: 130 MMHG | BODY MASS INDEX: 25.49 KG/M2 | OXYGEN SATURATION: 95 % | DIASTOLIC BLOOD PRESSURE: 84 MMHG | WEIGHT: 153 LBS | HEIGHT: 65 IN | RESPIRATION RATE: 16 BRPM

## 2021-08-10 DIAGNOSIS — E78.2 MIXED HYPERLIPIDEMIA: ICD-10-CM

## 2021-08-10 DIAGNOSIS — Z00.00 MEDICARE ANNUAL WELLNESS VISIT, SUBSEQUENT: Primary | ICD-10-CM

## 2021-08-10 DIAGNOSIS — Z00.00 MEDICARE ANNUAL WELLNESS VISIT, SUBSEQUENT: ICD-10-CM

## 2021-08-10 LAB
ANION GAP SERPL CALCULATED.3IONS-SCNC: 9.5 MMOL/L (ref 5–15)
BUN SERPL-MCNC: 15 MG/DL (ref 8–23)
BUN/CREAT SERPL: 14 (ref 7–25)
CALCIUM SPEC-SCNC: 10.1 MG/DL (ref 8.6–10.5)
CHLORIDE SERPL-SCNC: 102 MMOL/L (ref 98–107)
CHOLEST SERPL-MCNC: 165 MG/DL (ref 0–200)
CO2 SERPL-SCNC: 29.5 MMOL/L (ref 22–29)
CREAT SERPL-MCNC: 1.07 MG/DL (ref 0.57–1)
GFR SERPL CREATININE-BSD FRML MDRD: 49 ML/MIN/1.73
GLUCOSE SERPL-MCNC: 96 MG/DL (ref 65–99)
HDLC SERPL-MCNC: 54 MG/DL (ref 40–60)
LDLC SERPL CALC-MCNC: 90 MG/DL (ref 0–100)
LDLC/HDLC SERPL: 1.63 {RATIO}
POTASSIUM SERPL-SCNC: 3.5 MMOL/L (ref 3.5–5.2)
SODIUM SERPL-SCNC: 141 MMOL/L (ref 136–145)
TRIGL SERPL-MCNC: 116 MG/DL (ref 0–150)
TSH SERPL DL<=0.05 MIU/L-ACNC: 0.74 UIU/ML (ref 0.27–4.2)
VLDLC SERPL-MCNC: 21 MG/DL (ref 5–40)

## 2021-08-10 PROCEDURE — 80048 BASIC METABOLIC PNL TOTAL CA: CPT | Performed by: FAMILY MEDICINE

## 2021-08-10 PROCEDURE — 36415 COLL VENOUS BLD VENIPUNCTURE: CPT

## 2021-08-10 PROCEDURE — 1170F FXNL STATUS ASSESSED: CPT | Performed by: FAMILY MEDICINE

## 2021-08-10 PROCEDURE — 80061 LIPID PANEL: CPT | Performed by: FAMILY MEDICINE

## 2021-08-10 PROCEDURE — G0439 PPPS, SUBSEQ VISIT: HCPCS | Performed by: FAMILY MEDICINE

## 2021-08-10 PROCEDURE — 1160F RVW MEDS BY RX/DR IN RCRD: CPT | Performed by: FAMILY MEDICINE

## 2021-08-10 PROCEDURE — 84443 ASSAY THYROID STIM HORMONE: CPT | Performed by: FAMILY MEDICINE

## 2021-08-10 PROCEDURE — 96160 PT-FOCUSED HLTH RISK ASSMT: CPT | Performed by: FAMILY MEDICINE

## 2021-08-10 NOTE — PATIENT INSTRUCTIONS
Medicare Wellness  Personal Prevention Plan of Service     Date of Office Visit:  08/10/2021  Encounter Provider:  Matt Arana MD  Place of Service:  [facility]  Patient Name: Aparna Matson  :  1935    As part of the Medicare Wellness portion of your visit today, we are providing you with this personalized preventive plan of services (PPPS). This plan is based upon recommendations of the [place] Preventive Services Task Force (USPSTF) and the Advisory Committee on Immunization Practices (ACIP).    This lists the preventive care services that should be considered, and provides dates of when you are due. Items listed as completed are up-to-date and do not require any further intervention.    Health Maintenance   Topic Date Due   • DXA SCAN  Never done   • TDAP/TD VACCINES (1 - Tdap) Never done   • ZOSTER VACCINE (1 of 2) Never done   • Pneumococcal Vaccine 65+ (1 of 1 - PPSV23) Never done   • MAMMOGRAM  2020   • COVID-19 Vaccine (1) 2021 (Originally 1947)   • INFLUENZA VACCINE  10/01/2021   • LIPID PANEL  2022   • ANNUAL WELLNESS VISIT  08/10/2022       No orders of the defined types were placed in this encounter.      Return in about 6 months (around 2/10/2022) for Recheck.          Advance Directive    Advance directives are legal documents that let you make choices ahead of time about your health care and medical treatment in case you become unable to communicate for yourself. Advance directives are a way for you to make known your wishes to family, friends, and health care providers. This can let others know about your end-of-life care if you become unable to communicate.  Discussing and writing advance directives should happen over time rather than all at once. Advance directives can be changed depending on your situation and what you want, even after you have signed the advance directives.  There are different types of advance  directives, such as:  · Medical power of .  · Living will.  · Do not resuscitate (DNR) or do not attempt resuscitation (DNAR) order.  Health care proxy and medical power of   A health care proxy is also called a health care agent. This is a person who is appointed to make medical decisions for you in cases where you are unable to make the decisions yourself. Generally, people choose someone they know well and trust to represent their preferences. Make sure to ask this person for an agreement to act as your proxy. A proxy may have to exercise judgment in the event of a medical decision for which your wishes are not known.  A medical power of  is a legal document that names your health care proxy. Depending on the laws in your state, after the document is written, it may also need to be:  · Signed.  · Notarized.  · Dated.  · Copied.  · Witnessed.  · Incorporated into your medical record.  You may also want to appoint someone to manage your money in a situation in which you are unable to do so. This is called a durable power of  for finances. It is a separate legal document from the durable power of  for health care. You may choose the same person or someone different from your health care proxy to act as your agent in money matters.  If you do not appoint a proxy, or if there is a concern that the proxy is not acting in your best interests, a court may appoint a guardian to act on your behalf.  Living will  A living will is a set of instructions that state your wishes about medical care when you cannot express them yourself. Health care providers should keep a copy of your living will in your medical record. You may want to give a copy to family members or friends. To alert caregivers in case of an emergency, you can place a card in your wallet to let them know that you have a living will and where they can find it. A living will is used if you become:  · Terminally  ill.  · Disabled.  · Unable to communicate or make decisions.  Items to consider in your living will include:  · To use or not to use life-support equipment, such as dialysis machines and breathing machines (ventilators).  · A DNR or DNAR order. This tells health care providers not to use cardiopulmonary resuscitation (CPR) if breathing or heartbeat stops.  · To use or not to use tube feeding.  · To be given or not to be given food and fluids.  · Comfort (palliative) care when the goal becomes comfort rather than a cure.  · Donation of organs and tissues.  A living will does not give instructions for distributing your money and property if you should pass away.  DNR or DNAR  A DNR or DNAR order is a request not to have CPR in the event that your heart stops beating or you stop breathing. If a DNR or DNAR order has not been made and shared, a health care provider will try to help any patient whose heart has stopped or who has stopped breathing. If you plan to have surgery, talk with your health care provider about how your DNR or DNAR order will be followed if problems occur.  What if I do not have an advance directive?  If you do not have an advance directive, some states assign family decision makers to act on your behalf based on how closely you are related to them. Each state has its own laws about advance directives. You may want to check with your health care provider, , or state representative about the laws in your state.  Summary  · Advance directives are the legal documents that allow you to make choices ahead of time about your health care and medical treatment in case you become unable to tell others about your care.  · The process of discussing and writing advance directives should happen over time. You can change the advance directives, even after you have signed them.  · Advance directives include DNR or DNAR orders, living [place], and designating an agent as your medical power of  .  This information is not intended to replace advice given to you by your health care provider. Make sure you discuss any questions you have with your health care provider.  Document Revised: 01/28/2021 Document Reviewed: 07/16/2020  Elsevier Patient Education © 2021 [employer]      Fall Prevention in the Home, Adult  Falls can cause injuries. They can happen to people of all ages. There are many things you can do to make your home safe and to help prevent falls. Ask for help when making these changes, if needed.  What actions can I take to prevent falls?  General Instructions  · Use good lighting in all rooms. Replace any light bulbs that burn out.  · Turn on the lights when you go into a dark area. Use night-lights.  · Keep items that you use often in easy-to-reach places. Lower the shelves around your home if necessary.  · Set up your furniture so you have a clear path. Avoid moving your furniture around.  · Do not have throw rugs and other things on the floor that can make you trip.  · Avoid walking on wet floors.  · If any of your floors are uneven, fix them.  · Add color or contrast paint or tape to clearly frances and help you see:  ? Any grab bars or handrails.  ? First and last steps of stairways.  ? Where the edge of each step is.  · If you use a stepladder:  ? Make sure that it is fully opened. Do not climb a closed stepladder.  ? Make sure that both sides of the stepladder are locked into place.  ? Ask someone to hold the stepladder for you while you use it.  · If there are any pets around you, be aware of where they are.  What can I do in the bathroom?         · Keep the floor dry. Clean up any water that spills onto the floor as soon as it happens.  · Remove soap buildup in the tub or shower regularly.  · Use non-skid mats or decals on the floor of the tub or shower.  · Attach bath mats securely with double-sided, non-slip rug tape.  · If you need to sit down in the shower, use a plastic,  non-slip stool.  · Install grab bars by the toilet and in the tub and shower. Do not use towel bars as grab bars.  What can I do in the bedroom?  · Make sure that you have a light by your bed that is easy to reach.  · Do not use any sheets or blankets that are too big for your bed. They should not hang down onto the floor.  · Have a firm chair that has side arms. You can use this for support while you get dressed.  What can I do in the kitchen?  · Clean up any spills right away.  · If you need to reach something above you, use a strong step stool that has a grab bar.  · Keep electrical cords out of the way.  · Do not use floor polish or wax that makes floors slippery. If you must use wax, use non-skid floor wax.  What can I do with my stairs?  · Do not leave any items on the stairs.  · Make sure that you have a light switch at the top of the stairs and the bottom of the stairs. If you do not have them, ask someone to add them for you.  · Make sure that there are handrails on both sides of the stairs, and use them. Fix handrails that are broken or loose. Make sure that handrails are as long as the stairways.  · Install non-slip stair treads on all stairs in your home.  · Avoid having throw rugs at the top or bottom of the stairs. If you do have throw rugs, attach them to the floor with carpet tape.  · Choose a carpet that does not hide the edge of the steps on the stairway.  · Check any carpeting to make sure that it is firmly attached to the stairs. Fix any carpet that is loose or worn.  What can I do on the outside of my home?  · Use bright outdoor lighting.  · Regularly fix the edges of walkways and driveways and fix any cracks.  · Remove anything that might make you trip as you walk through a door, such as a raised step or threshold.  · Trim any bushes or trees on the path to your home.  · Regularly check to see if handrails are loose or broken. Make sure that both sides of any steps have handrails.  · Install  guardrails along the edges of any raised decks and porches.  · Clear walking paths of anything that might make someone trip, such as tools or rocks.  · Have any leaves, snow, or ice cleared regularly.  · Use sand or salt on walking paths during winter.  · Clean up any spills in your garage right away. This includes grease or oil spills.  What other actions can I take?  · Wear shoes that:  ? Have a low heel. Do not wear high heels.  ? Have rubber bottoms.  ? Are comfortable and fit you well.  ? Are closed at the toe. Do not wear open-toe sandals.  · Use tools that help you move around (mobility aids) if they are needed. These include:  ? Canes.  ? Walkers.  ? Scooters.  ? Crutches.  · Review your medicines with your doctor. Some medicines can make you feel dizzy. This can increase your chance of falling.  Ask your doctor what other things you can do to help prevent falls.  Where to find more information  · Centers for Disease Control and Prevention, STEADI: https://cdc.gov  · National [place] on Aging: https://nl3zspt.dina.nih.gov  Contact a doctor if:  · You are afraid of falling at home.  · You feel weak, drowsy, or dizzy at home.  · You fall at home.  Summary  · There are many simple things that you can do to make your home safe and to help prevent falls.  · Ways to make your home safe include removing tripping hazards and installing grab bars in the bathroom.  · Ask for help when making these changes in your home.  This information is not intended to replace advice given to you by your health care provider. Make sure you discuss any questions you have with your health care provider.  Document Revised: 04/09/2020 Document Reviewed: 08/02/2018  Elsevier Patient Education © 2021 [employer]    Please check with your insurance and get Shingrix vaccination series at your local pharmacy

## 2021-08-10 NOTE — ASSESSMENT & PLAN NOTE
Discussed healthy diet and  importance of regular exercise and recommend starting or continuing a regular exercise program for good health.  Stressed importance of moderation in sodium/caffeine intake, saturated fat and cholesterol, caloric balance, sufficient intake of fresh fruits, vegetables, fiber, calcium and iron.  Discussed importance of regular tooth brushing, flossing, and dental visits.  Annual flu shots are recommended every year in the fall.  Please check with your insurance and get Shingrix vaccination series at your local pharmacy.

## 2021-08-10 NOTE — PROGRESS NOTES
The ABCs of the Annual Wellness Visit  Subsequent Medicare Wellness Visit    Chief Complaint   Patient presents with   • Medicare Wellness-subsequent       Subjective   History of Present Illness:  Aparna Matson is a 86 y.o. female who presents for a Subsequent Medicare Wellness Visit.    HEALTH RISK ASSESSMENT    Recent Hospitalizations:  Recently treated at the following:  [facility]     Current Medical Providers:  Patient Care Team:  Matt Arana MD as PCP - General (Family Medicine)  Matt Arana MD as PCP - Family Medicine  Surya Johnson MD as Consulting Physician (Cardiology)    Smoking Status:  Social History     Tobacco Use   Smoking Status Never Smoker   Smokeless Tobacco Never Used       Alcohol Consumption:  Social History     Substance and Sexual Activity   Alcohol Use Not Currently       Depression Screen:   PHQ-2/PHQ-9 Depression Screening 8/10/2021   Little interest or pleasure in doing things 0   Feeling down, depressed, or hopeless 0   Trouble falling or staying asleep, or sleeping too much -   Feeling tired or having little energy -   Poor appetite or overeating -   Feeling bad about yourself - or that you are a failure or have let yourself or your family down -   Trouble concentrating on things, such as reading the newspaper or watching television -   Moving or speaking so slowly that other people could have noticed. Or the opposite - being so fidgety or restless that you have been moving around a lot more than usual -   Thoughts that you would be better off dead, or of hurting yourself in some way -   Total Score 0       Fall Risk Screen:  STEADI Fall Risk Assessment was completed, and patient is at LOW risk for falls.Assessment completed on:8/10/2021    Health Habits and Functional and Cognitive Screening:  Functional & Cognitive Status 8/10/2021   Do you have difficulty preparing food and eating? No   Do you have difficulty bathing yourself, getting dressed or  grooming yourself? No   Do you have difficulty using the toilet? No   Do you have difficulty moving around from place to place? No   Do you have trouble with steps or getting out of a bed or a chair? No   Current Diet Well Balanced Diet   Dental Exam Not up to date   Eye Exam Not up to date   Exercise (times per week) 0 times per week   Current Exercises Include No Regular Exercise   Do you need help using the phone?  No   Are you deaf or do you have serious difficulty hearing?  No   Do you need help with transportation? Yes   Do you need help shopping? Yes   Do you need help preparing meals?  No   Do you need help with housework?  No   Do you need help with laundry? No   Do you need help taking your medications? Yes   Do you need help managing money? No   Do you ever drive or ride in a car without wearing a seat belt? No   Have you felt unusual stress, anger or loneliness in the last month? No   Who do you live with? Other   If you need help, do you have trouble finding someone available to you? No   Have you been bothered in the last four weeks by sexual problems? No   Do you have difficulty concentrating, remembering or making decisions? Yes         Does the patient have evidence of cognitive impairment? No    Asprin use counseling:Taking ASA appropriately as indicated    Age-appropriate Screening Schedule:  Refer to the list below for future screening recommendations based on patient's age, sex and/or medical conditions. Orders for these recommended tests are listed in the plan section. The patient has been provided with a written plan.    Health Maintenance   Topic Date Due   • DXA SCAN  Never done   • TDAP/TD VACCINES (1 - Tdap) Never done   • ZOSTER VACCINE (1 of 2) Never done   • MAMMOGRAM  03/12/2020   • INFLUENZA VACCINE  10/01/2021   • LIPID PANEL  05/06/2022          The following portions of the patient's history were reviewed and updated as appropriate: past medical history, past social history, past  surgical history and problem list.    Outpatient Medications Prior to Visit   Medication Sig Dispense Refill   • acetaminophen (TYLENOL) 500 MG tablet Take 500 mg by mouth Every 6 (Six) Hours As Needed for Mild Pain .     • aspirin (aspirin) 81 MG EC tablet Take 1 tablet by mouth Daily. 30 tablet 11   • cholecalciferol (VITAMIN D3) 25 MCG (1000 UT) tablet Take 1 tablet by mouth Daily.     • dilTIAZem CD (CARDIZEM CD) 120 MG 24 hr capsule Take 1 capsule by mouth Daily. 90 capsule 3   • furosemide (LASIX) 20 MG tablet Take 1 tablet by mouth Daily As Needed (edema). 90 tablet 1   • losartan-hydrochlorothiazide (HYZAAR) 100-12.5 MG per tablet TAKE 1 TABLET EVERY DAY 90 tablet 3   • pravastatin (PRAVACHOL) 40 MG tablet TAKE 1 TABLET EVERY DAY 90 tablet 3   • Synthroid 100 MCG tablet TAKE 1 TABLET EVERY DAY 90 tablet 3     No facility-administered medications prior to visit.       Patient Active Problem List   Diagnosis   • Hypertension   • Hyperlipidemia   • Hypothyroidism   • Pneumonia due to COVID-19 virus   • Abnormal blood chemistry level   • Aneurysm of iliac artery (CMS/HCC)   • Blepharitis   • Breast cyst, right   • Ductal carcinoma in situ (DCIS) of breast   • Hearing loss   • Hx of mastectomy, right   • Lens replaced by other means   • Macular degeneration (senile) of retina   • Memory impairment   • Postmenopausal status   • Senile nuclear sclerosis   • Varicose veins of lower extremity   • Acute kidney injury (CMS/HCC)   • Paroxysmal atrial fibrillation (CMS/HCC)   • Medicare annual wellness visit, subsequent       Advanced Care Planning:  ACP discussion was held with the patient during this visit. Patient does not have an advance directive, declines further assistance.    Review of Systems   Constitutional: Positive for fatigue. Negative for appetite change.   HENT: Negative for trouble swallowing.    Respiratory: Negative for shortness of breath and wheezing.    Gastrointestinal: Negative for abdominal pain.  "  Endocrine: Negative for cold intolerance, polydipsia and polyuria.   Genitourinary: Negative for dysuria and hematuria.   Neurological: Negative for dizziness and headaches.   Psychiatric/Behavioral: Negative for sleep disturbance. The patient is not nervous/anxious.        Compared to one year ago, the patient feels her physical health is the same.  Compared to one year ago, the patient feels her mental health is the same.    Reviewed chart for potential of high risk medication in the elderly: yes  Reviewed chart for potential of harmful drug interactions in the elderly:yes    Objective         Vitals:    08/10/21 0958 08/10/21 1033   BP: (!) 182/82 130/84   Pulse: (!) 49 80   Resp: 16    Temp: 97.5 °F (36.4 °C)    SpO2: 95%    Weight: 69.4 kg (153 lb)    Height: 165.1 cm (65\")        Body mass index is 25.46 kg/m².  Discussed the patient's BMI with her. The BMI is in the acceptable range.    Physical Exam  Vitals reviewed.   Constitutional:       Appearance: She is well-developed.   Neck:      Thyroid: No thyromegaly.   Pulmonary:      Effort: Pulmonary effort is normal.      Breath sounds: Normal breath sounds. No wheezing.   Abdominal:      General: Bowel sounds are normal.      Palpations: Abdomen is soft.   Neurological:      Mental Status: She is alert and oriented to person, place, and time.   Psychiatric:         Mood and Affect: Mood normal.               Assessment/Plan   Medicare Risks and Personalized Health Plan  CMS Preventative Services Quick Reference  Advance Directive Discussion  Depression/Dysphoria  Fall Risk  Immunizations Discussed/Encouraged (specific immunizations; Shingrix and COVID19 )  Osteoporosis Risk    The above risks/problems have been discussed with the patient.  Pertinent information has been shared with the patient in the After Visit Summary.  Follow up plans and orders are seen below in the Assessment/Plan Section.    Diagnoses and all orders for this visit:    1. Medicare " annual wellness visit, subsequent (Primary)  Assessment & Plan:  Discussed healthy diet and  importance of regular exercise and recommend starting or continuing a regular exercise program for good health.  Stressed importance of moderation in sodium/caffeine intake, saturated fat and cholesterol, caloric balance, sufficient intake of fresh fruits, vegetables, fiber, calcium and iron.  Discussed importance of regular tooth brushing, flossing, and dental visits.  Annual flu shots are recommended every year in the fall.  Please check with your insurance and get Shingrix vaccination series at your local pharmacy.    Orders:  -     Lipid panel; Future  -     Basic metabolic panel; Future  -     TSH; Future    2. Mixed hyperlipidemia  -     Lipid panel; Future    Follow Up:  Return in about 6 months (around 2/10/2022) for Recheck.     An After Visit Summary and PPPS were given to the patient.

## 2021-11-10 RX ORDER — FUROSEMIDE 20 MG/1
20 TABLET ORAL DAILY PRN
Qty: 90 TABLET | Refills: 1 | Status: SHIPPED | OUTPATIENT
Start: 2021-11-10 | End: 2022-05-02

## 2021-12-22 RX ORDER — PRAVASTATIN SODIUM 40 MG
TABLET ORAL
Qty: 90 TABLET | Refills: 3 | Status: SHIPPED | OUTPATIENT
Start: 2021-12-22 | End: 2023-01-04

## 2022-02-09 RX ORDER — POTASSIUM CHLORIDE 20 MEQ/1
TABLET, EXTENDED RELEASE ORAL
Qty: 90 TABLET | Refills: 3 | Status: SHIPPED | OUTPATIENT
Start: 2022-02-09

## 2022-02-09 RX ORDER — LEVOTHYROXINE SODIUM 100 MCG
TABLET ORAL
Qty: 90 TABLET | Refills: 3 | Status: SHIPPED | OUTPATIENT
Start: 2022-02-09 | End: 2022-12-07

## 2022-03-10 RX ORDER — LOSARTAN POTASSIUM AND HYDROCHLOROTHIAZIDE 12.5; 1 MG/1; MG/1
TABLET ORAL
Qty: 90 TABLET | Refills: 3 | Status: SHIPPED | OUTPATIENT
Start: 2022-03-10 | End: 2022-05-02

## 2022-05-02 ENCOUNTER — OFFICE VISIT (OUTPATIENT)
Dept: FAMILY MEDICINE CLINIC | Facility: CLINIC | Age: 87
End: 2022-05-02

## 2022-05-02 ENCOUNTER — LAB (OUTPATIENT)
Dept: LAB | Facility: HOSPITAL | Age: 87
End: 2022-05-02

## 2022-05-02 VITALS
HEART RATE: 49 BPM | TEMPERATURE: 97.7 F | BODY MASS INDEX: 23.82 KG/M2 | DIASTOLIC BLOOD PRESSURE: 67 MMHG | WEIGHT: 148.2 LBS | OXYGEN SATURATION: 95 % | HEIGHT: 66 IN | RESPIRATION RATE: 16 BRPM | SYSTOLIC BLOOD PRESSURE: 104 MMHG

## 2022-05-02 DIAGNOSIS — R53.83 FATIGUE, UNSPECIFIED TYPE: ICD-10-CM

## 2022-05-02 DIAGNOSIS — I10 PRIMARY HYPERTENSION: ICD-10-CM

## 2022-05-02 DIAGNOSIS — I10 PRIMARY HYPERTENSION: Primary | ICD-10-CM

## 2022-05-02 LAB
ANION GAP SERPL CALCULATED.3IONS-SCNC: 13.7 MMOL/L (ref 5–15)
BASOPHILS # BLD AUTO: 0.06 10*3/MM3 (ref 0–0.2)
BASOPHILS NFR BLD AUTO: 0.7 % (ref 0–1.5)
BUN SERPL-MCNC: 27 MG/DL (ref 8–23)
BUN/CREAT SERPL: 17.3 (ref 7–25)
CALCIUM SPEC-SCNC: 10.8 MG/DL (ref 8.6–10.5)
CHLORIDE SERPL-SCNC: 99 MMOL/L (ref 98–107)
CO2 SERPL-SCNC: 30.3 MMOL/L (ref 22–29)
CREAT SERPL-MCNC: 1.56 MG/DL (ref 0.57–1)
DEPRECATED RDW RBC AUTO: 41 FL (ref 37–54)
EGFRCR SERPLBLD CKD-EPI 2021: 32.6 ML/MIN/1.73
EOSINOPHIL # BLD AUTO: 0.12 10*3/MM3 (ref 0–0.4)
EOSINOPHIL NFR BLD AUTO: 1.4 % (ref 0.3–6.2)
ERYTHROCYTE [DISTWIDTH] IN BLOOD BY AUTOMATED COUNT: 12.1 % (ref 12.3–15.4)
GLUCOSE SERPL-MCNC: 99 MG/DL (ref 65–99)
HCT VFR BLD AUTO: 41.2 % (ref 34–46.6)
HGB BLD-MCNC: 13.9 G/DL (ref 12–15.9)
LYMPHOCYTES # BLD AUTO: 2.59 10*3/MM3 (ref 0.7–3.1)
LYMPHOCYTES NFR BLD AUTO: 30.2 % (ref 19.6–45.3)
MCH RBC QN AUTO: 31.2 PG (ref 26.6–33)
MCHC RBC AUTO-ENTMCNC: 33.7 G/DL (ref 31.5–35.7)
MCV RBC AUTO: 92.4 FL (ref 79–97)
MONOCYTES # BLD AUTO: 0.66 10*3/MM3 (ref 0.1–0.9)
MONOCYTES NFR BLD AUTO: 7.7 % (ref 5–12)
NEUTROPHILS NFR BLD AUTO: 5.11 10*3/MM3 (ref 1.7–7)
NEUTROPHILS NFR BLD AUTO: 59.6 % (ref 42.7–76)
PLATELET # BLD AUTO: 175 10*3/MM3 (ref 140–450)
PMV BLD AUTO: 14.7 FL (ref 6–12)
POTASSIUM SERPL-SCNC: 3.2 MMOL/L (ref 3.5–5.2)
RBC # BLD AUTO: 4.46 10*6/MM3 (ref 3.77–5.28)
SODIUM SERPL-SCNC: 143 MMOL/L (ref 136–145)
VIT B12 BLD-MCNC: 302 PG/ML (ref 211–946)
WBC NRBC COR # BLD: 8.57 10*3/MM3 (ref 3.4–10.8)

## 2022-05-02 PROCEDURE — 85025 COMPLETE CBC W/AUTO DIFF WBC: CPT

## 2022-05-02 PROCEDURE — 82607 VITAMIN B-12: CPT

## 2022-05-02 PROCEDURE — 36415 COLL VENOUS BLD VENIPUNCTURE: CPT

## 2022-05-02 PROCEDURE — 99213 OFFICE O/P EST LOW 20 MIN: CPT | Performed by: FAMILY MEDICINE

## 2022-05-02 PROCEDURE — 80048 BASIC METABOLIC PNL TOTAL CA: CPT

## 2022-05-02 RX ORDER — LOSARTAN POTASSIUM AND HYDROCHLOROTHIAZIDE 12.5; 1 MG/1; MG/1
TABLET ORAL
Qty: 90 TABLET | Refills: 3
Start: 2022-05-02 | End: 2023-02-20

## 2022-05-02 NOTE — PROGRESS NOTES
Subjective   Aparna Matson is a 86 y.o. female.     Patient present with C/O fatigue, weakness and lack of energy. Sx noted 1 week ago.       The following portions of the patient's history were reviewed and updated as appropriate: past medical history, past social history, past surgical history and problem list.    Review of Systems   Constitutional: Positive for fatigue. Negative for fever.   Respiratory: Negative for shortness of breath.    Cardiovascular: Negative for chest pain.   Neurological: Positive for weakness and light-headedness. Negative for dizziness and headache.       Objective   Physical Exam  Vitals reviewed.   Cardiovascular:      Heart sounds: Normal heart sounds.   Pulmonary:      Effort: Pulmonary effort is normal.      Breath sounds: Normal breath sounds.   Musculoskeletal:      Cervical back: Neck supple.   Neurological:      Mental Status: She is alert and oriented to person, place, and time.       Vitals:    05/02/22 0835   BP: 104/67   Pulse: (!) 49   Resp: 16   Temp: 97.7 °F (36.5 °C)   SpO2: 95%     Current Outpatient Medications on File Prior to Visit   Medication Sig Dispense Refill   • acetaminophen (TYLENOL) 500 MG tablet Take 500 mg by mouth Every 6 (Six) Hours As Needed for Mild Pain .     • aspirin (aspirin) 81 MG EC tablet Take 1 tablet by mouth Daily. 30 tablet 11   • cholecalciferol (VITAMIN D3) 25 MCG (1000 UT) tablet Take 1 tablet by mouth Daily.     • potassium chloride (K-DUR,KLOR-CON) 20 MEQ CR tablet TAKE 1 TABLET EVERY DAY 90 tablet 3   • pravastatin (PRAVACHOL) 40 MG tablet TAKE 1 TABLET EVERY DAY 90 tablet 3   • Synthroid 100 MCG tablet TAKE 1 TABLET EVERY DAY 90 tablet 3     No current facility-administered medications on file prior to visit.           Assessment/Plan   Problems Addressed this Visit        Cardiac and Vasculature    Hypertension - Primary     BP on the lower side, recommend to  Decrease Losartan - HCTZ 1/2 daily and stopped  Lasix.encourage plenty  of fluids.           Relevant Medications    losartan-hydrochlorothiazide (HYZAAR) 100-12.5 MG per tablet    Other Relevant Orders    Basic metabolic panel (Completed)      Other Visit Diagnoses     Fatigue, unspecified type        Relevant Orders    CBC w AUTO Differential (Completed)    Vitamin B12 (Completed)      Diagnoses       Codes Comments    Primary hypertension    -  Primary ICD-10-CM: I10  ICD-9-CM: 401.9     Fatigue, unspecified type     ICD-10-CM: R53.83  ICD-9-CM: 780.79

## 2022-05-03 DIAGNOSIS — E87.6 HYPOKALEMIA: Primary | ICD-10-CM

## 2022-05-04 RX ORDER — DILTIAZEM HYDROCHLORIDE 120 MG/1
CAPSULE, COATED, EXTENDED RELEASE ORAL
Qty: 90 CAPSULE | Refills: 3 | Status: SHIPPED | OUTPATIENT
Start: 2022-05-04

## 2022-05-09 NOTE — ASSESSMENT & PLAN NOTE
BP on the lower side, recommend to  Decrease Losartan - HCTZ 1/2 daily and stopped  Lasix.encourage plenty of fluids.

## 2022-08-02 ENCOUNTER — LAB (OUTPATIENT)
Dept: FAMILY MEDICINE CLINIC | Facility: CLINIC | Age: 87
End: 2022-08-02

## 2022-08-02 ENCOUNTER — OFFICE VISIT (OUTPATIENT)
Dept: FAMILY MEDICINE CLINIC | Facility: CLINIC | Age: 87
End: 2022-08-02

## 2022-08-02 VITALS
RESPIRATION RATE: 16 BRPM | HEART RATE: 49 BPM | SYSTOLIC BLOOD PRESSURE: 141 MMHG | WEIGHT: 154 LBS | DIASTOLIC BLOOD PRESSURE: 77 MMHG | OXYGEN SATURATION: 95 % | BODY MASS INDEX: 24.75 KG/M2 | TEMPERATURE: 98.2 F | HEIGHT: 66 IN

## 2022-08-02 DIAGNOSIS — E03.9 HYPOTHYROIDISM, UNSPECIFIED TYPE: ICD-10-CM

## 2022-08-02 DIAGNOSIS — I10 PRIMARY HYPERTENSION: Primary | ICD-10-CM

## 2022-08-02 DIAGNOSIS — I10 PRIMARY HYPERTENSION: ICD-10-CM

## 2022-08-02 DIAGNOSIS — E78.2 MIXED HYPERLIPIDEMIA: ICD-10-CM

## 2022-08-02 LAB
ALBUMIN SERPL-MCNC: 4 G/DL (ref 3.5–5.2)
ALBUMIN/GLOB SERPL: 1.6 G/DL
ALP SERPL-CCNC: 42 U/L (ref 39–117)
ALT SERPL W P-5'-P-CCNC: 8 U/L (ref 1–33)
ANION GAP SERPL CALCULATED.3IONS-SCNC: 10 MMOL/L (ref 5–15)
AST SERPL-CCNC: 13 U/L (ref 1–32)
BILIRUB SERPL-MCNC: 0.6 MG/DL (ref 0–1.2)
BUN SERPL-MCNC: 18 MG/DL (ref 8–23)
BUN/CREAT SERPL: 15.9 (ref 7–25)
CALCIUM SPEC-SCNC: 10 MG/DL (ref 8.6–10.5)
CHLORIDE SERPL-SCNC: 106 MMOL/L (ref 98–107)
CHOLEST SERPL-MCNC: 158 MG/DL (ref 0–200)
CO2 SERPL-SCNC: 28 MMOL/L (ref 22–29)
CREAT SERPL-MCNC: 1.13 MG/DL (ref 0.57–1)
EGFRCR SERPLBLD CKD-EPI 2021: 47.8 ML/MIN/1.73
GLOBULIN UR ELPH-MCNC: 2.5 GM/DL
GLUCOSE SERPL-MCNC: 94 MG/DL (ref 65–99)
HDLC SERPL-MCNC: 55 MG/DL (ref 40–60)
LDLC SERPL CALC-MCNC: 84 MG/DL (ref 0–100)
LDLC/HDLC SERPL: 1.5 {RATIO}
POTASSIUM SERPL-SCNC: 3.7 MMOL/L (ref 3.5–5.2)
PROT SERPL-MCNC: 6.5 G/DL (ref 6–8.5)
SODIUM SERPL-SCNC: 144 MMOL/L (ref 136–145)
TRIGL SERPL-MCNC: 102 MG/DL (ref 0–150)
TSH SERPL DL<=0.05 MIU/L-ACNC: 0.64 UIU/ML (ref 0.27–4.2)
VLDLC SERPL-MCNC: 19 MG/DL (ref 5–40)

## 2022-08-02 PROCEDURE — 99214 OFFICE O/P EST MOD 30 MIN: CPT | Performed by: FAMILY MEDICINE

## 2022-08-02 PROCEDURE — 84443 ASSAY THYROID STIM HORMONE: CPT | Performed by: FAMILY MEDICINE

## 2022-08-02 PROCEDURE — 80061 LIPID PANEL: CPT | Performed by: FAMILY MEDICINE

## 2022-08-02 PROCEDURE — 36415 COLL VENOUS BLD VENIPUNCTURE: CPT

## 2022-08-02 PROCEDURE — 80053 COMPREHEN METABOLIC PANEL: CPT | Performed by: FAMILY MEDICINE

## 2022-08-02 NOTE — PROGRESS NOTES
Subjective   Aparna Matson is a 87 y.o. female.     History of Present Illness      HTN  The patient present for follow-up on hypertension and hyperlipidemia. The problem has been gradually improving since onset. The problem is controlled. Pertinent negatives include no anxiety, blurred vision, chest pain, palpitations, peripheral edema or shortness of breath. she is taking losartan HCTZ and pravastatin.  Hypothyroidism  The patient also  presents with 6 months f/u on  hypothyroidism.  She complains of fatigue but denies chest pain, palpitations, shortness of breath, trouble swallowing, anxiety, nervousness, dry skin and hair loss.  Since the last visit, the patient states   she is taking medication as directed.    The following portions of the patient's history were reviewed and updated as appropriate: past medical history, past social history, past surgical history and problem list.    Review of Systems   Constitutional: Negative for activity change, appetite change and fatigue.   HENT: Negative for sore throat and trouble swallowing.    Respiratory: Negative for cough, shortness of breath and wheezing.    Cardiovascular: Negative for chest pain and palpitations.   Gastrointestinal: Negative for abdominal pain, constipation, nausea, vomiting and indigestion.   Endocrine: Negative for cold intolerance and heat intolerance.   Genitourinary: Negative for dysuria.   Neurological: Negative for headache.   Psychiatric/Behavioral: Negative for sleep disturbance. The patient is not nervous/anxious.        Objective   Physical Exam  Vitals reviewed.   Constitutional:       General: She is not in acute distress.     Appearance: She is well-developed.   Neck:      Thyroid: No thyromegaly.   Pulmonary:      Effort: Pulmonary effort is normal.      Breath sounds: Normal breath sounds. No wheezing.   Abdominal:      General: Bowel sounds are normal.      Palpations: Abdomen is soft.      Tenderness: There is no abdominal  tenderness.   Musculoskeletal:         General: Normal range of motion.      Cervical back: Neck supple.   Neurological:      Mental Status: She is alert and oriented to person, place, and time.   Psychiatric:         Mood and Affect: Mood normal.       Vitals:    08/02/22 0803   BP: 141/77   Pulse: (!) 49   Resp: 16   Temp: 98.2 °F (36.8 °C)   SpO2: 95%     Current Outpatient Medications on File Prior to Visit   Medication Sig Dispense Refill   • acetaminophen (TYLENOL) 500 MG tablet Take 500 mg by mouth Every 6 (Six) Hours As Needed for Mild Pain .     • cholecalciferol (VITAMIN D3) 25 MCG (1000 UT) tablet Take 1 tablet by mouth Daily.     • dilTIAZem CD (CARDIZEM CD) 120 MG 24 hr capsule TAKE 1 CAPSULE EVERY DAY 90 capsule 3   • losartan-hydrochlorothiazide (HYZAAR) 100-12.5 MG per tablet Take 1/2 daily 90 tablet 3   • potassium chloride (K-DUR,KLOR-CON) 20 MEQ CR tablet TAKE 1 TABLET EVERY DAY 90 tablet 3   • pravastatin (PRAVACHOL) 40 MG tablet TAKE 1 TABLET EVERY DAY 90 tablet 3   • Synthroid 100 MCG tablet TAKE 1 TABLET EVERY DAY 90 tablet 3   • [DISCONTINUED] aspirin (aspirin) 81 MG EC tablet Take 1 tablet by mouth Daily. 30 tablet 11     No current facility-administered medications on file prior to visit.           Assessment & Plan   Problems Addressed this Visit        Cardiac and Vasculature    Hypertension - Primary     Hypertension is improving with treatment.    Continue losartan -HCTZ and diltiazem  Dietary sodium restriction.  Regular aerobic exercise.  Blood pressure will be reassessed in 3 months.         Relevant Orders    Lipid panel    Comprehensive metabolic panel    Hyperlipidemia     Stable-  continue current treatment regimen.  Continue pravastatin  Discussed dietary changes and lifestyle modifications.           Relevant Orders    Lipid panel    Comprehensive metabolic panel    TSH       Endocrine and Metabolic    Hypothyroidism     Stable continue current dose of levothyroxine we will  check TSH.         Relevant Orders    Lipid panel    TSH      Diagnoses       Codes Comments    Primary hypertension    -  Primary ICD-10-CM: I10  ICD-9-CM: 401.9     Mixed hyperlipidemia     ICD-10-CM: E78.2  ICD-9-CM: 272.2     Hypothyroidism, unspecified type     ICD-10-CM: E03.9  ICD-9-CM: 244.9

## 2022-08-02 NOTE — ASSESSMENT & PLAN NOTE
Stable-  continue current treatment regimen.  Continue pravastatin  Discussed dietary changes and lifestyle modifications.

## 2022-08-02 NOTE — ASSESSMENT & PLAN NOTE
Hypertension is improving with treatment.    Continue losartan -HCTZ and diltiazem  Dietary sodium restriction.  Regular aerobic exercise.  Blood pressure will be reassessed in 3 months.

## 2022-12-07 RX ORDER — LEVOTHYROXINE SODIUM 100 MCG
TABLET ORAL
Qty: 90 TABLET | Refills: 3 | Status: SHIPPED | OUTPATIENT
Start: 2022-12-07

## 2023-01-04 RX ORDER — PRAVASTATIN SODIUM 40 MG
TABLET ORAL
Qty: 90 TABLET | Refills: 3 | Status: SHIPPED | OUTPATIENT
Start: 2023-01-04

## 2023-02-20 RX ORDER — LOSARTAN POTASSIUM AND HYDROCHLOROTHIAZIDE 12.5; 1 MG/1; MG/1
TABLET ORAL
Qty: 90 TABLET | Refills: 3 | Status: SHIPPED | OUTPATIENT
Start: 2023-02-20

## 2023-05-22 RX ORDER — DILTIAZEM HYDROCHLORIDE 120 MG/1
CAPSULE, COATED, EXTENDED RELEASE ORAL
Qty: 90 CAPSULE | Refills: 3 | Status: SHIPPED | OUTPATIENT
Start: 2023-05-22

## 2023-08-26 ENCOUNTER — HOSPITAL ENCOUNTER (INPATIENT)
Facility: HOSPITAL | Age: 88
LOS: 1 days | Discharge: HOME HEALTH | DRG: 244 | End: 2023-08-30
Attending: EMERGENCY MEDICINE | Admitting: STUDENT IN AN ORGANIZED HEALTH CARE EDUCATION/TRAINING PROGRAM
Payer: MEDICARE

## 2023-08-26 DIAGNOSIS — R42 DIZZINESS: Primary | ICD-10-CM

## 2023-08-26 DIAGNOSIS — I49.5 SICK SINUS SYNDROME: ICD-10-CM

## 2023-08-26 DIAGNOSIS — Z95.0 S/P PLACEMENT OF CARDIAC PACEMAKER: ICD-10-CM

## 2023-08-26 PROCEDURE — 99285 EMERGENCY DEPT VISIT HI MDM: CPT

## 2023-08-26 NOTE — Clinical Note
Level of Care: Telemetry [5]   Admitting Physician: VERONICA KLEIN [[id number]]   Bed Request Comments: KAREN

## 2023-08-27 ENCOUNTER — APPOINTMENT (OUTPATIENT)
Dept: CT IMAGING | Facility: HOSPITAL | Age: 88
DRG: 244 | End: 2023-08-27
Payer: MEDICARE

## 2023-08-27 ENCOUNTER — APPOINTMENT (OUTPATIENT)
Dept: MRI IMAGING | Facility: HOSPITAL | Age: 88
DRG: 244 | End: 2023-08-27
Payer: MEDICARE

## 2023-08-27 PROBLEM — R42 DIZZINESS: Status: ACTIVE | Noted: 2023-08-27

## 2023-08-27 LAB
ALBUMIN SERPL-MCNC: 3.6 G/DL (ref 3.5–5.2)
ALBUMIN/GLOB SERPL: 1.4 G/DL
ALP SERPL-CCNC: 48 U/L (ref 39–117)
ALT SERPL W P-5'-P-CCNC: 6 U/L (ref 1–33)
ANION GAP SERPL CALCULATED.3IONS-SCNC: 10 MMOL/L (ref 5–15)
AST SERPL-CCNC: 13 U/L (ref 1–32)
BACTERIA UR QL AUTO: ABNORMAL /HPF
BASOPHILS # BLD AUTO: 0 10*3/MM3 (ref 0–0.2)
BASOPHILS # BLD AUTO: 0.1 10*3/MM3 (ref 0–0.2)
BASOPHILS NFR BLD AUTO: 0.3 % (ref 0–1.5)
BASOPHILS NFR BLD AUTO: 1.2 % (ref 0–1.5)
BILIRUB SERPL-MCNC: 0.5 MG/DL (ref 0–1.2)
BILIRUB UR QL STRIP: NEGATIVE
BUN SERPL-MCNC: 22 MG/DL (ref 8–23)
BUN/CREAT SERPL: 19 (ref 7–25)
CALCIUM SPEC-SCNC: 9.8 MG/DL (ref 8.6–10.5)
CHLORIDE SERPL-SCNC: 100 MMOL/L (ref 98–107)
CLARITY UR: CLEAR
CO2 SERPL-SCNC: 28 MMOL/L (ref 22–29)
COLOR UR: YELLOW
CREAT SERPL-MCNC: 1.16 MG/DL (ref 0.57–1)
DEPRECATED RDW RBC AUTO: 43.8 FL (ref 37–54)
DEPRECATED RDW RBC AUTO: 47.3 FL (ref 37–54)
EGFRCR SERPLBLD CKD-EPI 2021: 46 ML/MIN/1.73
EOSINOPHIL # BLD AUTO: 0.2 10*3/MM3 (ref 0–0.4)
EOSINOPHIL # BLD AUTO: 0.2 10*3/MM3 (ref 0–0.4)
EOSINOPHIL NFR BLD AUTO: 1.7 % (ref 0.3–6.2)
EOSINOPHIL NFR BLD AUTO: 2.8 % (ref 0.3–6.2)
ERYTHROCYTE [DISTWIDTH] IN BLOOD BY AUTOMATED COUNT: 13.7 % (ref 12.3–15.4)
ERYTHROCYTE [DISTWIDTH] IN BLOOD BY AUTOMATED COUNT: 14.3 % (ref 12.3–15.4)
FLUAV SUBTYP SPEC NAA+PROBE: NOT DETECTED
FLUBV RNA ISLT QL NAA+PROBE: NOT DETECTED
GEN 5 2HR TROPONIN T REFLEX: 17 NG/L
GLOBULIN UR ELPH-MCNC: 2.6 GM/DL
GLUCOSE SERPL-MCNC: 126 MG/DL (ref 65–99)
GLUCOSE UR STRIP-MCNC: NEGATIVE MG/DL
HCT VFR BLD AUTO: 34.9 % (ref 34–46.6)
HCT VFR BLD AUTO: 40.2 % (ref 34–46.6)
HGB BLD-MCNC: 11.6 G/DL (ref 12–15.9)
HGB BLD-MCNC: 13.3 G/DL (ref 12–15.9)
HGB UR QL STRIP.AUTO: NEGATIVE
HYALINE CASTS UR QL AUTO: ABNORMAL /LPF
KETONES UR QL STRIP: NEGATIVE
LEUKOCYTE ESTERASE UR QL STRIP.AUTO: ABNORMAL
LYMPHOCYTES # BLD AUTO: 1.9 10*3/MM3 (ref 0.7–3.1)
LYMPHOCYTES # BLD AUTO: 2.4 10*3/MM3 (ref 0.7–3.1)
LYMPHOCYTES NFR BLD AUTO: 19.4 % (ref 19.6–45.3)
LYMPHOCYTES NFR BLD AUTO: 37.1 % (ref 19.6–45.3)
MAGNESIUM SERPL-MCNC: 1.9 MG/DL (ref 1.6–2.4)
MAGNESIUM SERPL-MCNC: 2 MG/DL (ref 1.6–2.4)
MCH RBC QN AUTO: 29.8 PG (ref 26.6–33)
MCH RBC QN AUTO: 30.6 PG (ref 26.6–33)
MCHC RBC AUTO-ENTMCNC: 33.1 G/DL (ref 31.5–35.7)
MCHC RBC AUTO-ENTMCNC: 33.2 G/DL (ref 31.5–35.7)
MCV RBC AUTO: 89.9 FL (ref 79–97)
MCV RBC AUTO: 92 FL (ref 79–97)
MONOCYTES # BLD AUTO: 0.6 10*3/MM3 (ref 0.1–0.9)
MONOCYTES # BLD AUTO: 0.7 10*3/MM3 (ref 0.1–0.9)
MONOCYTES NFR BLD AUTO: 6.8 % (ref 5–12)
MONOCYTES NFR BLD AUTO: 9.6 % (ref 5–12)
NEUTROPHILS NFR BLD AUTO: 3.3 10*3/MM3 (ref 1.7–7)
NEUTROPHILS NFR BLD AUTO: 50.2 % (ref 42.7–76)
NEUTROPHILS NFR BLD AUTO: 7.1 10*3/MM3 (ref 1.7–7)
NEUTROPHILS NFR BLD AUTO: 70.9 % (ref 42.7–76)
NITRITE UR QL STRIP: NEGATIVE
NRBC BLD AUTO-RTO: 0.1 /100 WBC (ref 0–0.2)
NRBC BLD AUTO-RTO: 0.1 /100 WBC (ref 0–0.2)
PH UR STRIP.AUTO: 5.5 [PH] (ref 5–8)
PLATELET # BLD AUTO: 116 10*3/MM3 (ref 140–450)
PLATELET # BLD AUTO: 135 10*3/MM3 (ref 140–450)
PMV BLD AUTO: 12.1 FL (ref 6–12)
PMV BLD AUTO: 12.5 FL (ref 6–12)
POTASSIUM SERPL-SCNC: 3.4 MMOL/L (ref 3.5–5.2)
PROT SERPL-MCNC: 6.2 G/DL (ref 6–8.5)
PROT UR QL STRIP: NEGATIVE
QT INTERVAL: 496 MS
QTC INTERVAL: 440 MS
RBC # BLD AUTO: 3.79 10*6/MM3 (ref 3.77–5.28)
RBC # BLD AUTO: 4.47 10*6/MM3 (ref 3.77–5.28)
RBC # UR STRIP: ABNORMAL /HPF
REF LAB TEST METHOD: ABNORMAL
SARS-COV-2 RNA RESP QL NAA+PROBE: NOT DETECTED
SODIUM SERPL-SCNC: 138 MMOL/L (ref 136–145)
SP GR UR STRIP: 1.01 (ref 1–1.03)
SQUAMOUS #/AREA URNS HPF: ABNORMAL /HPF
TROPONIN T DELTA: 4 NG/L
TROPONIN T SERPL HS-MCNC: 13 NG/L
TSH SERPL DL<=0.05 MIU/L-ACNC: 0.95 UIU/ML (ref 0.27–4.2)
UROBILINOGEN UR QL STRIP: ABNORMAL
WBC # UR STRIP: ABNORMAL /HPF
WBC NRBC COR # BLD: 10 10*3/MM3 (ref 3.4–10.8)
WBC NRBC COR # BLD: 6.5 10*3/MM3 (ref 3.4–10.8)

## 2023-08-27 PROCEDURE — G0378 HOSPITAL OBSERVATION PER HR: HCPCS

## 2023-08-27 PROCEDURE — 83735 ASSAY OF MAGNESIUM: CPT | Performed by: NURSE PRACTITIONER

## 2023-08-27 PROCEDURE — 97166 OT EVAL MOD COMPLEX 45 MIN: CPT

## 2023-08-27 PROCEDURE — 83735 ASSAY OF MAGNESIUM: CPT | Performed by: EMERGENCY MEDICINE

## 2023-08-27 PROCEDURE — 80053 COMPREHEN METABOLIC PANEL: CPT | Performed by: EMERGENCY MEDICINE

## 2023-08-27 PROCEDURE — 81001 URINALYSIS AUTO W/SCOPE: CPT | Performed by: EMERGENCY MEDICINE

## 2023-08-27 PROCEDURE — 93005 ELECTROCARDIOGRAM TRACING: CPT | Performed by: EMERGENCY MEDICINE

## 2023-08-27 PROCEDURE — 70450 CT HEAD/BRAIN W/O DYE: CPT

## 2023-08-27 PROCEDURE — 25010000002 ONDANSETRON PER 1 MG: Performed by: EMERGENCY MEDICINE

## 2023-08-27 PROCEDURE — 70551 MRI BRAIN STEM W/O DYE: CPT

## 2023-08-27 PROCEDURE — 36415 COLL VENOUS BLD VENIPUNCTURE: CPT | Performed by: NURSE PRACTITIONER

## 2023-08-27 PROCEDURE — 99214 OFFICE O/P EST MOD 30 MIN: CPT | Performed by: INTERNAL MEDICINE

## 2023-08-27 PROCEDURE — 84443 ASSAY THYROID STIM HORMONE: CPT | Performed by: EMERGENCY MEDICINE

## 2023-08-27 PROCEDURE — 36415 COLL VENOUS BLD VENIPUNCTURE: CPT

## 2023-08-27 PROCEDURE — 84484 ASSAY OF TROPONIN QUANT: CPT | Performed by: EMERGENCY MEDICINE

## 2023-08-27 PROCEDURE — 85025 COMPLETE CBC W/AUTO DIFF WBC: CPT | Performed by: EMERGENCY MEDICINE

## 2023-08-27 PROCEDURE — 87636 SARSCOV2 & INF A&B AMP PRB: CPT | Performed by: EMERGENCY MEDICINE

## 2023-08-27 PROCEDURE — 85025 COMPLETE CBC W/AUTO DIFF WBC: CPT | Performed by: NURSE PRACTITIONER

## 2023-08-27 RX ORDER — ATORVASTATIN CALCIUM 10 MG/1
10 TABLET, FILM COATED ORAL NIGHTLY
Status: DISCONTINUED | OUTPATIENT
Start: 2023-08-27 | End: 2023-08-30 | Stop reason: HOSPADM

## 2023-08-27 RX ORDER — POTASSIUM CHLORIDE 20 MEQ/1
40 TABLET, EXTENDED RELEASE ORAL ONCE
Status: COMPLETED | OUTPATIENT
Start: 2023-08-27 | End: 2023-08-27

## 2023-08-27 RX ORDER — MELATONIN
1000 DAILY
Status: DISCONTINUED | OUTPATIENT
Start: 2023-08-28 | End: 2023-08-30 | Stop reason: HOSPADM

## 2023-08-27 RX ORDER — HYDROCHLOROTHIAZIDE 12.5 MG/1
12.5 TABLET ORAL
Status: DISCONTINUED | OUTPATIENT
Start: 2023-08-27 | End: 2023-08-27

## 2023-08-27 RX ORDER — ASPIRIN 81 MG/1
81 TABLET ORAL DAILY
Status: DISCONTINUED | OUTPATIENT
Start: 2023-08-27 | End: 2023-08-29

## 2023-08-27 RX ORDER — ONDANSETRON 2 MG/ML
4 INJECTION INTRAMUSCULAR; INTRAVENOUS ONCE
Status: COMPLETED | OUTPATIENT
Start: 2023-08-27 | End: 2023-08-27

## 2023-08-27 RX ORDER — ASPIRIN 81 MG/1
81 TABLET ORAL DAILY
Status: DISCONTINUED | OUTPATIENT
Start: 2023-08-28 | End: 2023-08-27

## 2023-08-27 RX ORDER — ASPIRIN 81 MG/1
1 TABLET ORAL DAILY
COMMUNITY

## 2023-08-27 RX ORDER — POLYETHYLENE GLYCOL 3350 17 G/17G
17 POWDER, FOR SOLUTION ORAL DAILY PRN
Status: DISCONTINUED | OUTPATIENT
Start: 2023-08-27 | End: 2023-08-30 | Stop reason: HOSPADM

## 2023-08-27 RX ORDER — LEVOTHYROXINE SODIUM 0.1 MG/1
100 TABLET ORAL DAILY
Status: DISCONTINUED | OUTPATIENT
Start: 2023-08-28 | End: 2023-08-30 | Stop reason: HOSPADM

## 2023-08-27 RX ORDER — SODIUM CHLORIDE 0.9 % (FLUSH) 0.9 %
10 SYRINGE (ML) INJECTION AS NEEDED
Status: DISCONTINUED | OUTPATIENT
Start: 2023-08-27 | End: 2023-08-30 | Stop reason: HOSPADM

## 2023-08-27 RX ORDER — BISACODYL 5 MG/1
5 TABLET, DELAYED RELEASE ORAL DAILY PRN
Status: DISCONTINUED | OUTPATIENT
Start: 2023-08-27 | End: 2023-08-30 | Stop reason: HOSPADM

## 2023-08-27 RX ORDER — NITROGLYCERIN 0.4 MG/1
0.4 TABLET SUBLINGUAL
Status: DISCONTINUED | OUTPATIENT
Start: 2023-08-27 | End: 2023-08-30 | Stop reason: HOSPADM

## 2023-08-27 RX ORDER — SODIUM CHLORIDE 9 MG/ML
40 INJECTION, SOLUTION INTRAVENOUS AS NEEDED
Status: DISCONTINUED | OUTPATIENT
Start: 2023-08-27 | End: 2023-08-30 | Stop reason: HOSPADM

## 2023-08-27 RX ORDER — HYDRALAZINE HYDROCHLORIDE 20 MG/ML
10 INJECTION INTRAMUSCULAR; INTRAVENOUS EVERY 6 HOURS PRN
Status: DISCONTINUED | OUTPATIENT
Start: 2023-08-27 | End: 2023-08-30 | Stop reason: HOSPADM

## 2023-08-27 RX ORDER — LOSARTAN POTASSIUM 50 MG/1
50 TABLET ORAL
Status: DISCONTINUED | OUTPATIENT
Start: 2023-08-27 | End: 2023-08-27

## 2023-08-27 RX ORDER — AMOXICILLIN 250 MG
2 CAPSULE ORAL 2 TIMES DAILY
Status: DISCONTINUED | OUTPATIENT
Start: 2023-08-27 | End: 2023-08-30 | Stop reason: HOSPADM

## 2023-08-27 RX ORDER — SODIUM CHLORIDE 9 MG/ML
125 INJECTION, SOLUTION INTRAVENOUS CONTINUOUS
Status: DISCONTINUED | OUTPATIENT
Start: 2023-08-27 | End: 2023-08-30

## 2023-08-27 RX ORDER — BISACODYL 10 MG
10 SUPPOSITORY, RECTAL RECTAL DAILY PRN
Status: DISCONTINUED | OUTPATIENT
Start: 2023-08-27 | End: 2023-08-30 | Stop reason: HOSPADM

## 2023-08-27 RX ORDER — SODIUM CHLORIDE 0.9 % (FLUSH) 0.9 %
10 SYRINGE (ML) INJECTION EVERY 12 HOURS SCHEDULED
Status: DISCONTINUED | OUTPATIENT
Start: 2023-08-27 | End: 2023-08-30 | Stop reason: HOSPADM

## 2023-08-27 RX ADMIN — Medication 10 ML: at 22:24

## 2023-08-27 RX ADMIN — SODIUM CHLORIDE 125 ML/HR: 9 INJECTION, SOLUTION INTRAVENOUS at 03:25

## 2023-08-27 RX ADMIN — ATORVASTATIN CALCIUM 10 MG: 10 TABLET, FILM COATED ORAL at 22:24

## 2023-08-27 RX ADMIN — POTASSIUM CHLORIDE 40 MEQ: 1500 TABLET, EXTENDED RELEASE ORAL at 03:23

## 2023-08-27 RX ADMIN — SODIUM CHLORIDE 125 ML/HR: 9 INJECTION, SOLUTION INTRAVENOUS at 22:23

## 2023-08-27 RX ADMIN — ASPIRIN 81 MG: 81 TABLET, COATED ORAL at 13:54

## 2023-08-27 RX ADMIN — ONDANSETRON 4 MG: 2 INJECTION INTRAMUSCULAR; INTRAVENOUS at 00:41

## 2023-08-27 NOTE — H&P
[facility] Medicine Services  History & Physical    Patient Name: Aparna Matson  : 1935  MRN: [id number]  Primary Care Physician:  Matt Arana MD  Date of admission: 2023  Date and Time of Service: 23 at 0420    Subjective      Chief Complaint: dizziness     History of Present Illness: Aparna Matson is a 88 y.o. female past medical history of hypertension, hyperlipidemia, hypothyroidism, paroxysmal A-fib and chronic kidney impairment who presented to [facility] on 2023 complaining of dizziness    Patient was seen with daughter at bedside.  Patient reports she was sitting at her computer and started feeling unwell.  She got up to walk to the couch and felt dizzy and off-balance and felt as if was going to fall.  Denied loss of consciousness. She called her daughter around 10 PM stating that she was unable to get off the couch as she has been sitting there for approximately 2 hours.  She denies any chest pain shortness of breath abdominal pain nausea or vomiting or focal deficit.  Denies headache or visual changes.  Daughter at bedside reports patient with decreased oral intake.  Unsure of medications if still currently taking losartan hydrochlorothiazide.    In the ED orthostatic vitals were taken lying down heart rate 55 blood pressure 146/73 sitting heart rate 56 blood pressure 151/68 standing heart rate 57 with blood pressure 140/72.  EKG was positive for sinus bradycardia with heart rate 48.  Slight elevation in troponin from 13-17.  Potassium 3.4 BUN 22 and creatinine 1.16 patient does take losartan hydrochlorothiazide.  Per discussion with the ED physician patient has no focal deficits.  Negative for orthostasis.  No infectious etiology.  No nystagmus.  Upon walking did appear unsteady.  Have ordered an MRI to rule out central etiology.  We will consult PT OT and patient has been admitted for further observation.      Review of Systems   Constitutional:  "       \"I do not drink much water\"   Musculoskeletal:         Unsteady gait   Neurological:  Positive for dizziness.   All other systems reviewed and are negative.     Personal History     Past Medical History:   Diagnosis Date    Abnormal mammogram of right breast     Breast cyst, right     Breast lump     BREAST LUMPS    Chickenpox     Dizziness     Ductal carcinoma in situ of right breast     Elevated serum creatinine     Hearing loss     Hemorrhoids     Hyperlipidemia     Hypertension     Hypothyroidism     Iliac artery aneurysm, left     Knee pain, left     Leg cramps     Leg pain, left     Mass of breast, right     Measles     Memory impairment     Mumps     Other specified local infections of the skin and subcutaneous tissue     SORE AT LEFT ANKLE     Otitis media, acute     RIGHT     Postmenopausal status     Sinus disorder     SINUS    Skin lesion of face     LEFT TEMPLE     Skin lesion of wrist     LEFT FOREARM    Varicose veins of both lower extremities     Whooping cough        Past Surgical History:   Procedure Laterality Date    BREAST LUMPECTOMY Left 1994    HYSTERECTOMY      MASTECTOMY Right 09/25/2017    DR. FUNG    THYROIDECTOMY, PARTIAL  1961       Family History: family history includes Aneurysm in her brother; Anuerysm in an other family member; Diabetes in an other family member; Heart attack (age of onset: 67) in her father; Heart disease in an other family member; Hypertension in an other family member. Otherwise pertinent FHx was reviewed and not pertinent to current issue.    Social History:  reports that she has never smoked. She has never used smokeless tobacco. She reports that she does not currently use alcohol. She reports that she does not use drugs.    Home Medications:  Prior to Admission Medications       Prescriptions Last Dose Informant Patient Reported? Taking?    acetaminophen (TYLENOL) 500 MG tablet   Yes No    Take 1 tablet by mouth Every 6 (Six) Hours As Needed for Mild " Pain.    cholecalciferol (VITAMIN D3) 25 MCG (1000 UT) tablet   No No    Take 1 tablet by mouth Daily.    dilTIAZem CD (CARDIZEM CD) 120 MG 24 hr capsule   No No    TAKE 1 CAPSULE EVERY DAY    losartan-hydrochlorothiazide (Hyzaar) 50-12.5 MG per tablet   No No    Take 1 tablet by mouth Daily.    pravastatin (PRAVACHOL) 40 MG tablet   No No    TAKE 1 TABLET EVERY DAY    Synthroid 100 MCG tablet   No No    TAKE 1 TABLET EVERY DAY              Allergies:  Allergies   Allergen Reactions    Codeine Hallucinations       Objective      Vitals:   Temp:  [98.9 °F (37.2 °C)] 98.9 °F (37.2 °C)  Heart Rate:  [50-52] 52  Resp:  [17] 17  BP: (150-158)/(72-73) 158/73    Physical Exam  Constitutional:       Appearance: Normal appearance.   Eyes:      Pupils: Pupils are equal, round, and reactive to light.   Cardiovascular:      Rate and Rhythm: Normal rate and regular rhythm.   Pulmonary:      Effort: Pulmonary effort is normal.      Breath sounds: Normal breath sounds.   Abdominal:      Palpations: Abdomen is soft.   Musculoskeletal:         General: Normal range of motion.   Neurological:      Mental Status: She is alert and oriented to person, place, and time.   Psychiatric:         Mood and Affect: Mood normal.         Behavior: Behavior normal.          Result Review    Result Review:  I have personally reviewed the results from the time of this admission to 8/27/2023 05:37 EDT and agree with these findings:  [x]  Laboratory  []  Microbiology  []  Radiology  [x]  EKG/Telemetry   []  Cardiology/Vascular   []  Pathology  []  Old records  []  Other:  Most notable findings include:   ECG 12 Lead Bradycardia   Preliminary Result   HEART RATE= 48  bpm   RR Interval= 1272  ms   SC Interval= 208  ms   P Horizontal Axis= -2  deg   P Front Axis= 52  deg   QRSD Interval= 105  ms   QT Interval= 496  ms   QTcB= 440  ms   QRS Axis= 17  deg   T Wave Axis= 55  deg   - ABNORMAL ECG -   Sinus bradycardia   Atrial premature complexes   When  compared with ECG of 24-Apr-2021 11:41:45,   No significant change   Electronically Signed By:    Date and Time of Study: 2023-08-27 00:17:05       CT Head Without Contrast    Result Date: 8/27/2023  Impression: 1. No acute intracranial abnormality. 2. Mild chronic small vessel ischemic change. Electronically Signed: Babatunde Jewell MD  8/27/2023 3:31 AM EDT  Workstation ID: [id number]      WBC   Date Value Ref Range Status   08/27/2023 10.00 3.40 - 10.80 10*3/mm3 Final     RBC   Date Value Ref Range Status   08/27/2023 4.47 3.77 - 5.28 10*6/mm3 Final     Hemoglobin   Date Value Ref Range Status   08/27/2023 13.3 12.0 - 15.9 g/dL Final     Hematocrit   Date Value Ref Range Status   08/27/2023 40.2 34.0 - 46.6 % Final     MCV   Date Value Ref Range Status   08/27/2023 89.9 79.0 - 97.0 fL Final     MCH   Date Value Ref Range Status   08/27/2023 29.8 26.6 - 33.0 pg Final     MCHC   Date Value Ref Range Status   08/27/2023 33.1 31.5 - 35.7 g/dL Final     RDW   Date Value Ref Range Status   08/27/2023 14.3 12.3 - 15.4 % Final     RDW-SD   Date Value Ref Range Status   08/27/2023 47.3 37.0 - 54.0 fl Final     MPV   Date Value Ref Range Status   08/27/2023 12.5 (H) 6.0 - 12.0 fL Final     Platelets   Date Value Ref Range Status   08/27/2023 135 (L) 140 - 450 10*3/mm3 Final     Neutrophil %   Date Value Ref Range Status   08/27/2023 70.9 42.7 - 76.0 % Final     Lymphocyte %   Date Value Ref Range Status   08/27/2023 19.4 (L) 19.6 - 45.3 % Final     Monocyte %   Date Value Ref Range Status   08/27/2023 6.8 5.0 - 12.0 % Final     Eosinophil %   Date Value Ref Range Status   08/27/2023 1.7 0.3 - 6.2 % Final     Basophil %   Date Value Ref Range Status   08/27/2023 1.2 0.0 - 1.5 % Final     Neutrophils, Absolute   Date Value Ref Range Status   08/27/2023 7.10 (H) 1.70 - 7.00 10*3/mm3 Final     Lymphocytes, Absolute   Date Value Ref Range Status   08/27/2023 1.90 0.70 - 3.10 10*3/mm3 Final     Monocytes, Absolute   Date Value Ref  "Range Status   08/27/2023 0.70 0.10 - 0.90 10*3/mm3 Final     Eosinophils, Absolute   Date Value Ref Range Status   08/27/2023 0.20 0.00 - 0.40 10*3/mm3 Final     Basophils, Absolute   Date Value Ref Range Status   08/27/2023 0.10 0.00 - 0.20 10*3/mm3 Final     nRBC   Date Value Ref Range Status   08/27/2023 0.1 0.0 - 0.2 /100 WBC Final      Lab Results   Component Value Date    GLUCOSE 126 (H) 08/27/2023    BUN 22 08/27/2023    CREATININE 1.16 (H) 08/27/2023    EGFR 46.0 (L) 08/27/2023    BCR 19.0 08/27/2023    K 3.4 (L) 08/27/2023    CO2 28.0 08/27/2023    CALCIUM 9.8 08/27/2023    ALBUMIN 3.6 08/27/2023    BILITOT 0.5 08/27/2023    AST 13 08/27/2023    ALT 6 08/27/2023          Assessment & Plan        Active Hospital Problems:  Active Hospital Problems    Diagnosis     **Dizziness      Plan:   Dizziness  - r/o neurological vs volume depletion vs medication related vs cardiac  - CT head negative  - MRI ordered by ED physician  - will have cardiology consult as pt with history of paroxysmal a-fib, HTN  - sinus bradycardia on EKG  - mild elevation in troponin  - will get echo   -may need medication adjustment  - dizziness improved lying flat  - negative for ortho stasis  - PT/OT  - tele  - potassium replacement ordered  - continue IVF    Chronic renal insufficiency  - will hold ACE/ARB/Diuretics  - IVF  - monitor     Hx HTN, lower extremity edema, Atrial fibrillation   - dtr reports assist with medication and pt has not been taking prn pill for \"leg swelling\"  - will hold cardizem and hyzaar until dtr can verify dose  - pt not on AC. Takes ASA 81mg daily    Hypothyroid  - continue synthroid once verified     HLD  - on statin     DVT prophylaxis:  Mechanical DVT prophylaxis orders are present.    CODE STATUS:    Medical Intervention Limits: NO intubation (DNI)  Code Status (Patient has no pulse and is not breathing): No CPR (Do Not Attempt to Resuscitate)  Medical Interventions (Patient has pulse or is breathing): " Limited Support    Admission Status:  I believe this patient meets observation status.    I discussed the patient's findings and my recommendations with patient.    This patient has been examined wearing appropriate Personal Protective Equipment       Signature: Electronically signed by LEISA Townsend, 08/27/23, 05:37 EDT.  [facility]

## 2023-08-27 NOTE — THERAPY EVALUATION
Patient Name: Aparna Matson  : 1935    MRN: [id number]                              Today's Date: 2023       Admit Date: 2023    Visit Dx:     ICD-10-CM ICD-9-CM   1. Dizziness  R42 780.4     Patient Active Problem List   Diagnosis    Hypertension    Hyperlipidemia    Hypothyroidism    Pneumonia due to COVID-19 virus    Abnormal blood chemistry level    Aneurysm of iliac artery    Blepharitis    Breast cyst, right    Ductal carcinoma in situ (DCIS) of breast    Hearing loss    Hx of mastectomy, right    Lens replaced by other means    Macular degeneration (senile) of retina    Memory impairment    Postmenopausal status    Senile nuclear sclerosis    Varicose veins of lower extremity    Acute kidney injury    Paroxysmal atrial fibrillation    Medicare annual wellness visit, subsequent    Dizziness     Past Medical History:   Diagnosis Date    Abnormal mammogram of right breast     Breast cyst, right     Breast lump     BREAST LUMPS    Chickenpox     Dizziness     Ductal carcinoma in situ of right breast     Elevated serum creatinine     Hearing loss     Hemorrhoids     Hyperlipidemia     Hypertension     Hypothyroidism     Iliac artery aneurysm, left     Knee pain, left     Leg cramps     Leg pain, left     Mass of breast, right     Measles     Memory impairment     Mumps     Other specified local infections of the skin and subcutaneous tissue     SORE AT LEFT ANKLE     Otitis media, acute     RIGHT     Postmenopausal status     Sinus disorder     SINUS    Skin lesion of face     LEFT TEMPLE     Skin lesion of wrist     LEFT FOREARM    Varicose veins of both lower extremities     Whooping cough      Past Surgical History:   Procedure Laterality Date    BREAST LUMPECTOMY Left     HYSTERECTOMY      MASTECTOMY Right 2017    DR. FUNG    THYROIDECTOMY, PARTIAL        General Information       Row Name 23 0957          General Information    Prior Level of Function independent:   pt states she has family assist for some IADL but she lives alone and does not use DME. She does have a Rolator if needed.  -     Existing Precautions/Restrictions cardiac  bradycardia. Pt denies any hx falls  -       Row Name 08/27/23 0957          Living Environment    People in Home alone  -       Row Name 08/27/23 0957          Home Main Entrance    Number of Stairs, Main Entrance two  -     Stair Railings, Main Entrance railings safe and in good condition  -       Row Name 08/27/23 0957          Stairs Within Home, Primary    Number of Stairs, Within Home, Primary none  ther is a cellar but not in use  -       Row Name 08/27/23 0957          Cognition    Orientation Status (Cognition) oriented x 4  -       Row Name 08/27/23 0957          Safety Issues, Functional Mobility    Impairments Affecting Function (Mobility) endurance/activity tolerance;other (see comments)  pt is very sleepy and lethargic but arousable and appropriate.  -               User Key  (r) = Recorded By, (t) = Taken By, (c) = Cosigned By      Initials Name Provider Type     Nazanin Jacobo, OT Occupational Therapist                     Mobility/ADL's       Row Name 08/27/23 1001          Bed Mobility    Bed Mobility bed mobility (all) activities  -     All Activities, [place] (Bed Mobility) set up  -     Assistive Device (Bed Mobility) head of bed elevated;bed rails  -       Row Name 08/27/23 1001          Transfers    Transfers sit-stand transfer;stand-sit transfer  -       Row Name 08/27/23 1001          Sit-Stand Transfer    Sit-Stand [place] (Transfers) set up;standby assist  -     Comment, (Sit-Stand Transfer) SBA for line management. Balance mildly impaired. Recommend Rolator due to complaints of lightheadedness after being up for several minutes.  -       Row Name 08/27/23 1001          Stand-Sit Transfer    Stand-Sit [place] (Transfers) set up  -       Row Name 08/27/23 1001           Functional Mobility    Functional Mobility- Ind. Level standby assist  -     Functional Mobility- Comment walked in room back & forth X3  -[facility] Name 08/27/23 1001          Activities of Daily Living    BADL Assessment/Intervention lower body dressing;feeding;toileting  -[facility] Name 08/27/23 1001          Lower Body Dressing Assessment/Training    Prichard Level (Lower Body Dressing) don;socks;set up  -     Position (Lower Body Dressing) sitting up in bed  -[facility] Name 08/27/23 1001          Self-Feeding Assessment/Training    [place]richard Level (Feeding) feeding skills;modified independence  -     Comment, (Feeding) pt wanted to sit EOb to eat & family were present so she was able to do so safely.  -[facility] Name 08/27/23 1001          Toileting Assessment/Training    [place]richard Level (Toileting) toileting skills;set up  -     Comment, (Toileting) is using BSC placed in ER holding room  -               User Key  (r) = Recorded By, (t) = Taken By, (c) = Cosigned By      Initials Name Provider Type     Nazanin Jacobo OT Occupational Therapist                   Obj/Interventions       [facility] Name 08/27/23 1005          Sensory Assessment (Somatosensory)    Sensory Assessment (Somatosensory) sensation intact  -[facility] Name 08/27/23 1005          Vision Assessment/Intervention    Visual Impairment/Limitations WFL  -[facility] Name 08/27/23 1005          Range of Motion Comprehensive    General Range of Motion no range of motion deficits identified  -[facility] Name 08/27/23 1005          Strength Comprehensive (MMT)    Comment, General Manual Muscle Testing (MMT) Assessment mild global weakness  -               User Key  (r) = Recorded By, (t) = Taken By, (c) = Cosigned By      Initials Name Provider Type     Nazanin Jacobo OT Occupational Therapist                   Goals/Plan       [facility] Name 08/27/23 1012          Transfer Goal 1 (OT)    Activity/Assistive Device  (Transfer Goal 1, OT) transfers, all;walker, rolling  -     [place] Level/Cues Needed (Transfer Goal 1, OT) modified independence  -     Time Frame (Transfer Goal 1, OT) by discharge  -       Row Name 08/27/23 1012          Bathing Goal 1 (OT)    Activity/Device (Bathing Goal 1, OT) bathing skills, all  -     [place] Level/Cues Needed (Bathing Goal 1, OT) modified independence  -     Time Frame (Bathing Goal 1, OT) 2 weeks  -       Row Name 08/27/23 1012          Dressing Goal 1 (OT)    Activity/Device (Dressing Goal 1, OT) dressing skills, all  -     [place]/Cues Needed (Dressing Goal 1, OT) modified independence  -     Time Frame (Dressing Goal 1, OT) 2 weeks  -       Row Name 08/27/23 1012          Grooming Goal 1 (OT)    Activity/Device (Grooming Goal 1, OT) grooming skills, all  -     [place] (Grooming Goal 1, OT) independent  -     Time Frame (Grooming Goal 1, OT) 1 week  standing at sink  -       Row Name 08/27/23 1012          Therapy Assessment/Plan (OT)    Planned Therapy Interventions (OT) activity tolerance training;adaptive equipment training;BADL retraining;functional balance retraining;occupation/activity based interventions;ROM/therapeutic exercise;patient/caregiver education/training;transfer/mobility retraining  -               User Key  (r) = Recorded By, (t) = Taken By, (c) = Cosigned By      Initials Name Provider Type     Nazanin Jacobo, OT Occupational Therapist                   Clinical Impression       Row Name 08/27/23 1005          Pain Assessment    Pretreatment Pain Rating 0/10 - no pain  -     Posttreatment Pain Rating 0/10 - no pain  -       Row Name 08/27/23 1005          Plan of Care Review    Plan of Care Reviewed With patient;family  -     Progress improving  -     Outcome Evaluation Pt is 89 y/o F admitted after a concerning dizzy spell. Pt is undergoing testing and thus far the only testing that is positive is for  bradycardia. Her HR has been running from the mid 40's to the mid 50's and when pt is up walking in the room it does not get higher than 59 BPM. She did not demonstrate orthostatic hypotension or delayed BP drop. Her diastolic BP was in fact elevated after being upright on her feet for a couple of minutes. She lives alone but has good family support and appears able to go home with [facility] and use of a Rolator as long as her familly are able to assist her with some IADL. Famly present in the room state they already do this and they feel some concern for her taking her medications on time. [facility] PT could improve strangth, balance, and activty tolerance and [facility] OT could address safety concerns. It does appear pt could benefit from some type of  aide but only if such a person is able to assist with taking medications. CM to address this question. Family state assisted living would not likely be a financial option.  -       Row Name 08/27/23 1005          Therapy Assessment/Plan (OT)    Rehab Potential (OT) good, to achieve stated therapy goals  -     Criteria for Skilled Therapeutic Interventions Met (OT) skilled treatment is necessary  -     Therapy Frequency (OT) 3 times/wk  -     Predicted Duration of Therapy Intervention (OT) until d/c  -       Row Name 08/27/23 1005          Therapy Plan Review/Discharge Plan (OT)    Anticipated Discharge Disposition (OT) home with home health;home with assist  -       Row Name 08/27/23 1014 08/27/23 1005       Vital Signs    Pre Systolic BP Rehab 149  - --    Pre Treatment Diastolic BP 83  - --    Intra Systolic BP Rehab 137  - --    Intra Treatment Diastolic BP 95  - --    Post Systolic BP Rehab 146  - --    Post Treatment Diastolic BP 74  - --    Pretreatment Heart Rate (beats/min) 47  -MH --    Intratreatment Heart Rate (beats/min) 59  - --    Posttreatment Heart Rate (beats/min) 48  - --    O2 Delivery Pre Treatment -- room air  -    O2 Delivery  Intra Treatment -- room air  -MH    O2 Delivery Post Treatment -- room air  -MH    Pre Patient Position -- Supine  -    Intra Patient Position -- Standing  -    Post Patient Position -- Sitting  -      Row Name 08/27/23 1005          Positioning and Restraints    Pre-Treatment Position in bed  -MH     Post Treatment Position bed  -MH     In Bed notified nsg;call light within reach;encouraged to call for assist;sitting EOB;with family/caregiver  -               User Key  (r) = Recorded By, (t) = Taken By, (c) = Cosigned By      Initials Name Provider Type     Nazanin Jacobo, OT Occupational Therapist                   Outcome Measures       Row Name 08/27/23 1013          How much help from another is currently needed...    Putting on and taking off regular lower body clothing? 3  -MH     Bathing (including washing, rinsing, and drying) 3  -MH     Toileting (which includes using toilet bed pan or urinal) 4  -MH     Putting on and taking off regular upper body clothing 4  -MH     Taking care of personal grooming (such as brushing teeth) 4  -MH     Eating meals 4  -     AM-PAC 6 Clicks Score (OT) 22  -       Row Name 08/27/23 1013          How much help from another person do you currently need...    Turning from your back to your side while in flat bed without using bedrails? 4  -MH     Moving from lying on back to sitting on the side of a flat bed without bedrails? 4  -MH     Moving to and from a bed to a chair (including a wheelchair)? 4  -MH     Standing up from a chair using your arms (e.g., wheelchair, bedside chair)? 4  -MH     Climbing 3-5 steps with a railing? 3  -MH     To walk in hospital room? 4  -MH     AM-PAC 6 Clicks Score (PT) 23  -     Highest level of mobility 7 --> Walked 25 feet or more  -       Row Name 08/27/23 1013          Functional Assessment    Outcome Measure Options AM-PAC 6 Clicks Basic Mobility (PT);AM-PAC 6 Clicks Daily Activity (OT)  -               User Key  (r) =  Recorded By, (t) = Taken By, (c) = Cosigned By      Initials Name Provider Type     Nazanin Jacobo OT Occupational Therapist                    Occupational Therapy Education       Title: PT OT SLP Therapies (In Progress)       Topic: Occupational Therapy (In Progress)       Point: ADL training (Done)       Description:   Instruct learner(s) on proper safety adaptation and remediation techniques during self care or transfers.   Instruct in proper use of assistive devices.                  Learning Progress Summary             Patient Acceptance, E,TB, VU by  at 8/27/2023 1014   Family Acceptance, E,TB, VU by  at 8/27/2023 1014                         Point: Home exercise program (Not Started)       Description:   Instruct learner(s) on appropriate technique for monitoring, assisting and/or progressing therapeutic exercises/activities.                  Learner Progress:  Not documented in this visit.              Point: Precautions (Done)       Description:   Instruct learner(s) on prescribed precautions during self-care and functional transfers.                  Learning Progress Summary             Patient Acceptance, E,TB, VU by  at 8/27/2023 1014   Family Acceptance, E,TB, VU by  at 8/27/2023 1014                         Point: Body mechanics (Done)       Description:   Instruct learner(s) on proper positioning and spine alignment during self-care, functional mobility activities and/or exercises.                  Learning Progress Summary             Patient Acceptance, E,TB, VU by  at 8/27/2023 1014   Family Acceptance, E,TB, VU by  at 8/27/2023 1014                                         User Key       Initials Effective Dates Name Provider Type [facility] 06/16/21 -  Nazanin Jacobo OT Occupational Therapist OT                  OT Recommendation and Plan  Planned Therapy Interventions (OT): activity tolerance training, adaptive equipment training, BADL retraining, functional balance  retraining, occupation/activity based interventions, ROM/therapeutic exercise, patient/caregiver education/training, transfer/mobility retraining  Therapy Frequency (OT): 3 times/wk  Plan of Care Review  Plan of Care Reviewed With: patient, family  Progress: improving  Outcome Evaluation: Pt is 89 y/o F admitted after a concerning dizzy spell. Pt is undergoing testing and thus far the only testing that is positive is for bradycardia. Her HR has been running from the mid 40's to the mid 50's and when pt is up walking in the room it does not get higher than 59 BPM. She did not demonstrate orthostatic hypotension or delayed BP drop. Her diastolic BP was in fact elevated after being upright on her feet for a couple of minutes. She lives alone but has good family support and appears able to go home with [facility] and use of a Rolator as long as her familly are able to assist her with some IADL. Famly present in the room state they already do this and they feel some concern for her taking her medications on time. [facility] PT could improve strangth, balance, and activty tolerance and [facility] OT could address safety concerns. It does appear pt could benefit from some type of HH aide but only if such a person is able to assist with taking medications. CM to address this question. Family state assisted living would not likely be a financial option.     Time Calculation:         Time Calculation- OT       Row Name 08/27/23 1015             Time Calculation-     OT Start Time 0834  -      OT Stop Time 0900  -      OT Time Calculation (min) 26 min  -      Total Timed Code Minutes- OT 0 minute(s)  -      OT Received On 08/27/23  -      OT - Next Appointment 08/29/23  -      OT Goal Re-Cert Due Date 09/10/23  -                User Key  (r) = Recorded By, (t) = Taken By, (c) = Cosigned By      Initials Name Provider Type    Nazanin Newell OT Occupational Therapist                  Therapy Charges for Today       Code  Description Service Date Service Provider Modifiers Qty    [id number] HC OT EVAL MOD COMPLEXITY 3 8/27/2023 Nazanin Jacobo OT GO 1                 Nazanin Jacobo OT  8/27/2023

## 2023-08-27 NOTE — ED NOTES
Orthostatic vitals  Laying down  Heart rate: 55  BP: 146/73  O2: 97  States she felt nauseous     Sitting  Heart rate: 56  BP: 151/68  O2: 97  States she felt a little dizzy    Standing   Heart rate: 57  BP: 140/72  O2: 97  States she felt dizzy when she stood up

## 2023-08-27 NOTE — ED PROVIDER NOTES
Subjective   History of Present Illness  88-year-old female with several hours of nausea, fatigue.  Patient has no other symptoms, specifically no headache or dizziness or chest pain or shortness of air or abdominal pain, no fever or vomiting or cough or congestion.    Review of Systems   Constitutional:  Positive for fatigue.   Gastrointestinal:  Positive for nausea.   All other systems reviewed and are negative.    Past Medical History:   Diagnosis Date    Abnormal mammogram of right breast     Breast cyst, right     Breast lump     BREAST LUMPS    Chickenpox     Dizziness     Ductal carcinoma in situ of right breast     Elevated serum creatinine     Hearing loss     Hemorrhoids     Hyperlipidemia     Hypertension     Hypothyroidism     Iliac artery aneurysm, left     Knee pain, left     Leg cramps     Leg pain, left     Mass of breast, right     Measles     Memory impairment     Mumps     Other specified local infections of the skin and subcutaneous tissue     SORE AT LEFT ANKLE     Otitis media, acute     RIGHT     Postmenopausal status     Sinus disorder     SINUS    Skin lesion of face     LEFT TEMPLE     Skin lesion of wrist     LEFT FOREARM    Varicose veins of both lower extremities     Whooping cough        Allergies   Allergen Reactions    Codeine Hallucinations       Past Surgical History:   Procedure Laterality Date    BREAST LUMPECTOMY Left 1994    HYSTERECTOMY      MASTECTOMY Right 09/25/2017    DR. FUNG    THYROIDECTOMY, PARTIAL  1961       Family History   Problem Relation Age of Onset    Heart attack Father 67        AMI    Aneurysm Brother         RUPTURED     Heart disease Other     Hypertension Other     Anuerysm Other         AAA- ABDOMINAL AORTIC ANUERYSM     Diabetes Other        Social History     Socioeconomic History    Marital status:    Tobacco Use    Smoking status: Never    Smokeless tobacco: Never   Vaping Use    Vaping Use: Never used   Substance and Sexual Activity     Alcohol use: Not Currently    Drug use: Never    Sexual activity: Not Currently           Objective   Physical Exam  Constitutional:       Appearance: Normal appearance.   HENT:      Head: Normocephalic and atraumatic.      Mouth/Throat:      Mouth: Mucous membranes are moist.      Pharynx: Oropharynx is clear.   Eyes:      Conjunctiva/sclera: Conjunctivae normal.      Pupils: Pupils are equal, round, and reactive to light.   Cardiovascular:      Rate and Rhythm: Bradycardia present.   Pulmonary:      Effort: Pulmonary effort is normal.      Breath sounds: Normal breath sounds.   Abdominal:      General: Bowel sounds are normal. There is no distension.      Palpations: Abdomen is soft.      Tenderness: There is no abdominal tenderness.   Musculoskeletal:         General: Normal range of motion.   Skin:     General: Skin is warm and dry.      Capillary Refill: Capillary refill takes less than 2 seconds.   Neurological:      General: No focal deficit present.      Mental Status: She is alert and oriented to person, place, and time.   Psychiatric:         Mood and Affect: Mood normal.         Behavior: Behavior normal.       Procedures           ED Course                                           Medical Decision Making  On reevaluation, daughter at bedside now states patient called her and told her that she had been dizzy for 2 hours onset while on the computer.  Patient states that she told me she was not dizzy because she no longer felt dizzy.  Patient denies any focal weakness or numbness, no difficulty speaking.  Patient ambulated and is able to ambulate with assistance but does states she feels unsteady.  Will obtain head CT, observe in the hospital, obtain orthostatics, replace potassium, gently hydrate.    Patient without any tilt on orthostatics, CT without any acute findings, dizziness with nonfocal neurological exam, will admit.    Case discussed with Ana with hospital service, agrees with  plan    Problems Addressed:  Dizziness: complicated acute illness or injury    Amount and/or Complexity of Data Reviewed  Labs: ordered.  Radiology: ordered.  ECG/medicine tests: ordered.     Details: EKG interpretation: Sinus bradycardia with PACs, rate 48, no acute ST change    Risk  Prescription drug management.  Decision regarding hospitalization.        Final diagnoses:   Dizziness       ED Disposition  ED Disposition       ED Disposition   Decision to Admit    Condition   --    Comment   Level of Care: Telemetry [5]   Diagnosis: Dizziness [147634]   Admitting Physician: VERONICA KLEIN [[id number]]                 No follow-up provider specified.       Medication List      No changes were made to your prescriptions during this visit.            Owen Watts MD  08/27/23 0426

## 2023-08-27 NOTE — SIGNIFICANT NOTE
Continued bradycardia in the 50s while awake 40s while asleep.  Blood pressure stable.  Patient reports improvement in dizziness and has been able to get out of bed and into recliner.  Cardiology recommendations noted.  We will continue to monitor.    Cardiology plan  Patient presented with nausea and dizziness   HS Troponin 13--17  EKG sinus bradycardia with 1st degree AV block   Hold cardizem to see if bradycardia improves   If no improvement in bradycardia or patient has tachyarrhythmias that require rate controlling medications in the next 24 to 48 hours will likely need pacemaker  And if patient's heart rate improves would recommend M cot at discharge to monitor for any arrhythmias and pauses   Monitor hypertension   Orthostatic vital signs negative   TSH normal   Potassium 3.4- replace check magnesium level   Continue IVFs for hydration    Echocardiogram pending   Previous echocardiogram 2021 normal EF and cardiac valves    Patient has a history of P. Afib in the setting of COVID was not placed on AC at that time   Continue Aspirin and statin.    Anticoagulation will be decided based on patient's progress.  Patient to follow-up with Dr. Johnson primary cardiologist

## 2023-08-27 NOTE — CONSULTS
[facility] [place] [facility]   CONSULT NOTE    Referring Provider: LEISA Townsend hospitalist  Reason for Consultation: Bradycardia    Patient Care Team:  Matt Arana MD as PCP - General (Family Medicine)  Matt Arana MD as PCP - Family Medicine  Surya Johnson MD as Consulting Physician (Cardiology)    Chief complaint dizziness weakness        History of present illness:  Aparna Matson is a 88 y.o. female with past medical history of hypertension dyslipidemia hypothyroidism, breast cancer status post right mastectomy, paroxysmal A-fib during COVID presented to the ED with dizziness weakness.  Patient reportedly was sitting at her desk and suddenly became dizzy lightheaded she went and sat on the couch and then about 2 hours later could not get up off the couch therefore she called her daughter.  Patient's son at bedside recently reported medication change with diuretic for blood pressure.     Labs in the ED showed high-sensitivity troponin of 13--17 potassium 3.4 creatinine 1.16 platelets 135    Patient has been bradycardic with ectopic beats in the 40s on telemetry      Review of Systems   Constitutional: Positive for malaise/fatigue.   Cardiovascular:  Positive for near-syncope. Negative for chest pain, dyspnea on exertion, leg swelling, palpitations and syncope.   Respiratory:  Negative for shortness of breath.    Gastrointestinal:  Positive for nausea. Negative for abdominal pain and vomiting.   Neurological:  Positive for dizziness, light-headedness and weakness.   All other systems reviewed and are negative.    History  Past Medical History:   Diagnosis Date    Abnormal mammogram of right breast     Breast cyst, right     Breast lump     BREAST LUMPS    Chickenpox     Dizziness     Ductal carcinoma in situ of right breast     Elevated serum creatinine     Hearing loss     Hemorrhoids     Hyperlipidemia     Hypertension     Hypothyroidism     Iliac artery  aneurysm, left     Knee pain, left     Leg cramps     Leg pain, left     Mass of breast, right     Measles     Memory impairment     Mumps     Other specified local infections of the skin and subcutaneous tissue     SORE AT LEFT ANKLE     Otitis media, acute     RIGHT     Postmenopausal status     Sinus disorder     SINUS    Skin lesion of face     LEFT TEMPLE     Skin lesion of wrist     LEFT FOREARM    Varicose veins of both lower extremities     Whooping cough        Past Surgical History:   Procedure Laterality Date    BREAST LUMPECTOMY Left 1994    HYSTERECTOMY      MASTECTOMY Right 09/25/2017    DR. FUNG    THYROIDECTOMY, PARTIAL  1961       Family History   Problem Relation Age of Onset    Heart attack Father 67        AMI    Aneurysm Brother         RUPTURED     Heart disease Other     Hypertension Other     Anuerysm Other         AAA- ABDOMINAL AORTIC ANUERYSM     Diabetes Other        Social History     Tobacco Use    Smoking status: Never    Smokeless tobacco: Never   Vaping Use    Vaping Use: Never used   Substance Use Topics    Alcohol use: Not Currently    Drug use: Never        (Not in a hospital admission)        Codeine    Scheduled Meds:senna-docusate sodium, 2 tablet, Oral, BID  sodium chloride, 10 mL, Intravenous, Q12H      Continuous Infusions:sodium chloride, 125 mL/hr, Last Rate: 125 mL/hr (08/27/23 0325)      PRN Meds:.  senna-docusate sodium **AND** polyethylene glycol **AND** bisacodyl **AND** bisacodyl    Calcium Replacement - Follow Nurse / BPA Driven Protocol    Magnesium Standard Dose Replacement - Follow Nurse / BPA Driven Protocol    nitroglycerin    Phosphorus Replacement - Follow Nurse / BPA Driven Protocol    Potassium Replacement - Follow Nurse / BPA Driven Protocol    sodium chloride    sodium chloride        VITAL SIGNS  Vitals:    08/27/23 0131 08/27/23 0137 08/27/23 0536 08/27/23 0800   BP: 158/73   148/83   Pulse:  52  (!) 46   Resp:    16   Temp:       TempSrc:      "  SpO2: 96%   96%   Weight:   68 kg (150 lb)    Height:           Flowsheet Rows      Flowsheet Row First Filed Value   Admission Height 165.1 cm (65\") Documented at 08/26/2023 2356   Admission Weight 68 kg (150 lb) Documented at 08/26/2023 2356             TELEMETRY: Sinus bradycardia with ectopic beats    Physical Exam:  Vitals and nursing note reviewed.   Constitutional:       Appearance: Not in distress. Frail.   Neck:      Vascular: No JVR. JVD normal.   Pulmonary:      Effort: Pulmonary effort is normal.      Breath sounds: Normal breath sounds. No wheezing. No rhonchi. No rales.   Chest:      Chest wall: Not tender to palpatation.   Cardiovascular:      PMI at left midclavicular line. Bradycardia present. Occasional ectopic beats. Irregular rhythm. Normal S1. Normal S2.       Murmurs: There is no murmur.      No gallop.  No click. No rub.   Pulses:     Intact distal pulses.   Edema:     Peripheral edema absent.   Abdominal:      General: Bowel sounds are normal.      Palpations: Abdomen is soft.      Tenderness: There is no abdominal tenderness.   Musculoskeletal: Normal range of motion.         General: No tenderness. Skin:     General: Skin is warm and dry.   Neurological:      General: No focal deficit present.      Mental Status: Alert and oriented to person, place and time.                LAB RESULTS (LAST 7 DAYS)    CBC  Results from last 7 days   Lab Units 08/27/23  0028   WBC 10*3/mm3 10.00   RBC 10*6/mm3 4.47   HEMOGLOBIN g/dL 13.3   HEMATOCRIT % 40.2   MCV fL 89.9   PLATELETS 10*3/mm3 135*       BMP  Results from last 7 days   Lab Units 08/27/23  0028   SODIUM mmol/L 138   POTASSIUM mmol/L 3.4*   CHLORIDE mmol/L 100   CO2 mmol/L 28.0   BUN mg/dL 22   CREATININE mg/dL 1.16*   GLUCOSE mg/dL 126*   MAGNESIUM mg/dL 2.0       CMP   Results from last 7 days   Lab Units 08/27/23  0028   SODIUM mmol/L 138   POTASSIUM mmol/L 3.4*   CHLORIDE mmol/L 100   CO2 mmol/L 28.0   BUN mg/dL 22   CREATININE mg/dL 1.16* "   GLUCOSE mg/dL 126*   ALBUMIN g/dL 3.6   BILIRUBIN mg/dL 0.5   ALK PHOS U/L 48   AST (SGOT) U/L 13   ALT (SGPT) U/L 6         BNP        TROPONIN  Results from last 7 days   Lab Units 08/27/23  0226   HSTROP T ng/L 17*       CoAg        Creatinine Clearance  Estimated Creatinine Clearance: 37.4 mL/min (A) (by C-G formula based on SCr of 1.16 mg/dL (H)).    ABG        Radiology  CT Head Without Contrast    Result Date: 8/27/2023  Impression: 1. No acute intracranial abnormality. 2. Mild chronic small vessel ischemic change. Electronically Signed: Babatunde Jewell MD  8/27/2023 3:31 AM EDT  Workstation ID: [id number]    MRI Brain Without Contrast    Result Date: 8/27/2023  Impression: 1.No acute intracranial process identified. 2.Findings suggestive of mild chronic small vessel ischemic disease. Electronically Signed: Devaughn Barboza MD  8/27/2023 8:09 AM EDT  Workstation ID: [id number]         EKG      I personally viewed and interpreted the patient's EKG/Telemetry data:    ECHOCARDIOGRAM:    Results for orders placed during the hospital encounter of 04/21/21    Adult Transthoracic Echo Limited W/ Cont if Necessary Per Protocol    Interpretation Summary  · Left ventricular ejection fraction appears to be 61 - 65%.      STRESS MYOVIEW:    CARDIAC CATHETERIZATION:    OTHER:         Assessment & Plan       Dizziness     Aparna Matson is a 88 y.o. female with past medical history of hypertension dyslipidemia hypothyroidism, breast cancer status post right mastectomy, paroxysmal A-fib during COVID presented to the ED with dizziness weakness.  Patient reportedly was sitting at her desk and suddenly became dizzy lightheaded she went and sat on the couch and then about 2 hours later could not get up off the couch therefore she called her daughter.  Patient's son at bedside recently reported medication change with diuretic for blood pressure.     Labs in the ED showed high-sensitivity troponin of 13--17 potassium 3.4  creatinine 1.16 platelets 135    Patient has been bradycardic with ectopic beats in the 40s on telemetry    ASSESSMENT:    - dizziness   - secondary to bradycardia   - paroxysmal afib in setting of COVID (2021)  - hypokalemia, mild   - hypertension   - dyslipidemia   - hypothyroidism   - Chronic kidney disease   - iliac artery aneurysm   - history of breast CA,   - previous left breast lumpectomy, right mastectomy, partial thyroidectomy, hysterectomy   - history of CAD in father   - non smoker          PLAN:  Patient presented with nausea and dizziness   HS Troponin 13--17  EKG sinus bradycardia with 1st degree AV block   Hold cardizem to see if bradycardia improves   If no improvement in the next 24 to 48 hours will likely need pacemaker  And if patient's heart rate improves would recommend M cot at discharge to monitor for any arrhythmias and pauses    Monitor hypertension   Orthostatic vital signs negative   TSH normal   Potassium 3.4- replace check magnesium level   Continue IVFs for hydration     Echocardiogram pending   Previous echocardiogram 2021 normal EF and cardiac valves     Patient has a history of P. Afib in the setting of COVID was not placed on AC at that time   Continue Aspirin and statin.     Further recommendations per Dr. Atkins     Patient to follow-up with Dr. Johnson primary cardiologist      I discussed the patients findings and my recommendations with patient, son, nurse    Electronically signed by LEISA Arizmendi, 08/27/23, 12:01 PM EDT.    LEISA Arizmendi  08/27/23  08:47 EDT    The patient was seen around 11:30 AM today.  Patient's family is at bedside along with the patient.  Chart was reviewed in entirety especially above note from nurse practitioner Hiral conley.    Patient presented with nausea dizziness and weakness.    Patient was found to have sinus bradycardia.  Patient has history of paroxysmal atrial fibrillation.  Patient has been on home Cardizem.  Patient has sinus  bradycardia on the EKG.  Patient has hypokalemia  Patient received potassium supplements.  Monitor for continued bradycardia.  Consideration for pacemaker implantation if patient has continued bradycardia despite stopping Cardizem or if patient has tach arrhythmias requiring rate controlling medications.  Echocardiogram.  Anticoagulation will be decided based on patient's progress.  Further plan will depend on patient's progress.  Electronically signed by Melly Atkins MD, 08/27/23, 12:27 PM EDT.

## 2023-08-28 ENCOUNTER — APPOINTMENT (OUTPATIENT)
Dept: CARDIOLOGY | Facility: HOSPITAL | Age: 88
DRG: 244 | End: 2023-08-28
Payer: MEDICARE

## 2023-08-28 ENCOUNTER — PREP FOR SURGERY (OUTPATIENT)
Dept: OTHER | Facility: HOSPITAL | Age: 88
End: 2023-08-28
Payer: MEDICARE

## 2023-08-28 DIAGNOSIS — I49.5 SICK SINUS SYNDROME: Primary | ICD-10-CM

## 2023-08-28 LAB
ANION GAP SERPL CALCULATED.3IONS-SCNC: 8 MMOL/L (ref 5–15)
BUN SERPL-MCNC: 21 MG/DL (ref 8–23)
BUN/CREAT SERPL: 21.4 (ref 7–25)
CALCIUM SPEC-SCNC: 8.9 MG/DL (ref 8.6–10.5)
CHLORIDE SERPL-SCNC: 108 MMOL/L (ref 98–107)
CO2 SERPL-SCNC: 25 MMOL/L (ref 22–29)
CREAT SERPL-MCNC: 0.98 MG/DL (ref 0.57–1)
EGFRCR SERPLBLD CKD-EPI 2021: 56.3 ML/MIN/1.73
GLUCOSE SERPL-MCNC: 89 MG/DL (ref 65–99)
MAGNESIUM SERPL-MCNC: 1.9 MG/DL (ref 1.6–2.4)
POTASSIUM SERPL-SCNC: 3.8 MMOL/L (ref 3.5–5.2)
SODIUM SERPL-SCNC: 141 MMOL/L (ref 136–145)

## 2023-08-28 PROCEDURE — G0378 HOSPITAL OBSERVATION PER HR: HCPCS

## 2023-08-28 PROCEDURE — 93306 TTE W/DOPPLER COMPLETE: CPT | Performed by: INTERNAL MEDICINE

## 2023-08-28 PROCEDURE — 99204 OFFICE O/P NEW MOD 45 MIN: CPT | Performed by: INTERNAL MEDICINE

## 2023-08-28 PROCEDURE — 97161 PT EVAL LOW COMPLEX 20 MIN: CPT

## 2023-08-28 PROCEDURE — 99215 OFFICE O/P EST HI 40 MIN: CPT | Performed by: INTERNAL MEDICINE

## 2023-08-28 PROCEDURE — 93306 TTE W/DOPPLER COMPLETE: CPT

## 2023-08-28 RX ADMIN — Medication 10 ML: at 20:11

## 2023-08-28 RX ADMIN — ATORVASTATIN CALCIUM 10 MG: 10 TABLET, FILM COATED ORAL at 20:10

## 2023-08-28 RX ADMIN — ASPIRIN 81 MG: 81 TABLET, COATED ORAL at 08:44

## 2023-08-28 RX ADMIN — LEVOTHYROXINE SODIUM 100 MCG: 0.1 TABLET ORAL at 05:37

## 2023-08-28 RX ADMIN — Medication 10 ML: at 08:44

## 2023-08-28 RX ADMIN — Medication 1000 UNITS: at 08:44

## 2023-08-28 NOTE — PROGRESS NOTES
[facility] Medicine Services   Daily Progress Note    Patient Name: Aparna Matson  : 1935  MRN: [id number]  Primary Care Physician:  Matt Arana MD  Date of admission: 2023  Date and Time of Service: 23  at 15:57 EDT       Subjective      Chief Complaint:Dizziness       HPI:   Aparna Matson is a 88 y.o. female past medical history of hypertension, hyperlipidemia, hypothyroidism, paroxysmal A-fib and chronic kidney impairment who presented to [facility] on 2023 complaining of dizziness     Patient was seen with daughter at bedside.  Patient reports she was sitting at her computer and started feeling unwell.  She got up to walk to the couch and felt dizzy and off-balance and felt as if was going to fall.  Denied loss of consciousness. She called her daughter around 10 PM stating that she was unable to get off the couch as she has been sitting there for approximately 2 hours.  She denies any chest pain shortness of breath abdominal pain nausea or vomiting or focal deficit.  Denies headache or visual changes.  Daughter at bedside reports patient with decreased oral intake.  Unsure of medications if still currently taking losartan hydrochlorothiazide.     In the ED orthostatic vitals were taken lying down heart rate 55 blood pressure 146/73 sitting heart rate 56 blood pressure 151/68 standing heart rate 57 with blood pressure 140/72.  EKG was positive for sinus bradycardia with heart rate 48.  Slight elevation in troponin from 13-17.  Potassium 3.4 BUN 22 and creatinine 1.16 patient does take losartan hydrochlorothiazide.  Per discussion with the ED physician patient has no focal deficits.  Negative for orthostasis.  No infectious etiology.  No nystagmus.  Upon walking did appear unsteady.  Have ordered an MRI to rule out central etiology.  We will consult PT OT and patient has been admitted for further observation.        Interval History:   23 :  Patient  was seen at bedside this morning. Overnight patient has been bradycardic on telemetry with heart rate upto 40s. She c/o dizziness on standing up. Denies any chest pain shortness of breath abdominal pain nausea vomiting.          Objective       Vitals:   Temp:  [97.3 °F (36.3 °C)-98.2 °F (36.8 °C)] 97.8 °F (36.6 °C)  Heart Rate:  [43-50] 47  Resp:  [12-18] 12  BP: (104-153)/() 153/73    Physical Exam  Constitutional:       Appearance: Normal appearance.   HENT:      Head: Normocephalic and atraumatic.      Nose: Nose normal.      Mouth/Throat:      Mouth: Mucous membranes are moist.   Eyes:      Extraocular Movements: Extraocular movements intact.      Pupils: Pupils are equal, round, and reactive to light.   Cardiovascular:      Rate and Rhythm: Regular rhythm. Bradycardia present.      Pulses: Normal pulses.      Heart sounds: Normal heart sounds.   Pulmonary:      Effort: Pulmonary effort is normal.      Breath sounds: Normal breath sounds.   Abdominal:      General: Bowel sounds are normal.      Palpations: Abdomen is soft.   Musculoskeletal:         General: Normal range of motion.      Cervical back: Normal range of motion.   Skin:     General: Skin is warm.      Capillary Refill: Capillary refill takes less than 2 seconds.   Neurological:      General: No focal deficit present.      Mental Status: She is alert and oriented to person, place, and time.   Psychiatric:         Mood and Affect: Mood normal.           Result Review:  I have personally reviewed the results from the time of this admission to 8/28/2023 15:57 EDT and agree with these findings:  [x]  Laboratory  [x]  Microbiology  [x]  Radiology  [x]  EKG/Telemetry   [x]  Cardiology/Vascular   []  Pathology  [x]  Old records  []  Other:  Most notable findings include:   EKG (08/27):   HEART RATE= 48  bpm  RR Interval= 1272  ms  UT Interval= 208  ms  P Horizontal Axis= -2  deg  P Front Axis= 52  deg  QRSD Interval= 105  ms  QT Interval= 496   ms  QTcB= 440  ms  QRS Axis= 17  deg  T Wave Axis= 55  deg  - ABNORMAL ECG -  Sinus bradycardia  Atrial premature complexes                    Assessment & Plan      Brief Patient Summary:  Aparna Matson is a 88 y.o. female admitted with dizziness and symptomatic bradycardia.      Current Medications:  aspirin, 81 mg, Oral, Daily  atorvastatin, 10 mg, Oral, Nightly  cholecalciferol, 1,000 Units, Oral, Daily  levothyroxine, 100 mcg, Oral, Daily  senna-docusate sodium, 2 tablet, Oral, BID  sodium chloride, 10 mL, Intravenous, Q12H       sodium chloride, 125 mL/hr, Last Rate: 125 mL/hr (08/27/23 2223)         Active Hospital Problems:  Active Hospital Problems    Diagnosis     **Dizziness        Plan:   # Dizziness due to Bradycardia  - CT/MRI head negative for acute pathology  - Cardiology consulted  - sinus bradycardia on EKG  - Echo pending  - TSH - 0.948  - Cardizem discontinued  - Continue telemetry  - Patient still bradycardic to 40s --> EP consulted for possible PPM  - negative for orthostasis  - PT/OT  - Monitor Electrolytes      # Chronic renal insufficiency  - Creatinine at baseline  -      # h/o Paroxysmal Afib in the setting of COVID  - Was not placed on AC  - continue aspirin, statin  - EP eval    # HTN  - Home antihypertensives were held due to elevated creatinine on admission  - Cr at baseline  - Consider resuming home Hyzaar if BP elevated  - monitor BP    # Hypokalemia - Resolved  - monitor electrolytes    # Hypothyroid  - continue synthroid     # HLD  - on statin     DVT prophylaxis:  Mechanical DVT prophylaxis orders are present.      CODE STATUS:    Medical Intervention Limits: NO intubation (DNI)  Code Status (Patient has no pulse and is not breathing): No CPR (Do Not Attempt to Resuscitate)  Medical Interventions (Patient has pulse or is breathing): Limited Support        Disposition:  I expect patient to be discharged home in 1-2 days pending EP eval for possible PPM  Discussed the plan of  care with patient and her family at bedside. Discussed with RN and .    Electronically signed by Leonel Gray MD, 08/28/23, 15:57 EDT.  [facility] Hospitalist Team

## 2023-08-28 NOTE — CASE MANAGEMENT/SOCIAL WORK
Discharge Planning Assessment  [facility]     Patient Name: Aparna Matson  MRN: [id number]  Today's Date: 8/28/2023    Admit Date: 8/26/2023    Plan: Home with daughter. [facility] pending acceptance.   Discharge Needs Assessment       Row Name 08/28/23 1453       Living Environment    People in Home alone  Patient discharging to daughter's house    Current Living Arrangements home    Potentially Unsafe Housing Conditions none    Primary Care Provided by self    Provides Primary Care For no one    Family Caregiver if Needed child(yaneli), adult    Quality of Family Relationships helpful;involved;supportive    Able to Return to Prior Arrangements yes       Resource/Environmental Concerns    Resource/Environmental Concerns none    Transportation Concerns none       Transition Planning    Patient/Family Anticipates Transition to home with help/services;home with family    Patient/Family Anticipated Services at Transition none    Transportation Anticipated family or friend will provide       Discharge Needs Assessment    Readmission Within the Last 30 Days no previous admission in last 30 days    Equipment Currently Used at Home bath bench;walker, rolling;rollator;cane, straight    Concerns to be Addressed discharge planning    Anticipated Changes Related to Illness none    Equipment Needed After Discharge none    Discharge Facility/Level of Care Needs home with home health    Provided Post Acute Provider List? Yes    Post Acute Provider List Home Health    Delivered To Patient;Support Person    Method of Delivery In person                   Discharge Plan       Row Name 08/28/23 1453       Plan    Plan Home with daughter. [facility] pending acceptance.    Patient/Family in Agreement with Plan yes    Plan Comments Patient lives at home alone. Patient will be discharging to daughter's ([facility]) home for 3 weeks after admission.  placed daughter's address in handoff.  Patient does not drive, daughter will transport at discharge.  Patient performs ADL. PCP and pharmacy confirmed. Denies financial assistance needs for medication and/or food. Family asked for private CG list, CM provided. Patient and family would like to try [facility], CM placed referral, message sent to liaison Aysha, pending response. Family has concerns about patient taking medications and cooking/eating once she is home alone, CM mentioned Meals on Wheels, family is thinking about [facility] and might want a referral later on. CM mentioned [facility], patient has not tried to apply for [facility], patient agreeable to have [facility] come and screen.                Demographic Summary       Row Name 08/28/23 1452       General Information    Admission Type observation    Arrived From emergency department    Required Notices Provided Observation Status Notice    Referral Source admission list    Reason for Consult discharge planning    Preferred Language English                   Functional Status       Row Name 08/28/23 1452       Functional Status    Usual Activity Tolerance good    Current Activity Tolerance moderate       Functional Status, IADL    Medications assistive person    Meal Preparation independent    Housekeeping independent    Laundry independent    Shopping independent             Met with patient in room.     Maintained distance greater than six feet and spent less than 15 minutes in the room.        Trina Lujan RN, BSN  3A/2A   [address] [address]  Phone: [phone]  Fax: [phone]

## 2023-08-28 NOTE — PLAN OF CARE
Goal Outcome Evaluation:                      No change in status. Pt heart rate staying bernard in the 40s. Pt is resting. VSS, call light within reach. Plan of care ongoing.

## 2023-08-28 NOTE — DISCHARGE PLACEMENT REQUEST
"Aparna Matson (88 y.o. Female)       Date of Birth   1935    Social Security Number       Address   [address] [place] [address]    Home Phone   [phone]    MRN   [id number]       Oriental orthodox   None    Marital Status                               Admission Date   8/26/23    Admission Type   Emergency    Admitting Provider   Wilner Villanueva DO    Attending Provider   Leonel Gray MD    Department, Room/Bed   [facility] 3C MEDICAL INPATIENT, 382/1       Discharge Date       Discharge Disposition       Discharge Destination                                 Attending Provider: Leonel Gray MD    Allergies: Codeine    Isolation: None   Infection: None   Code Status: No CPR    Ht: 165.1 cm (65\")   Wt: 68 kg (150 lb)    Admission Cmt: None   Principal Problem: Dizziness [R42]                   Active Insurance as of 8/26/2023       Primary Coverage       Payor Plan Insurance Group Employer/Plan Group    HUMANA MEDICARE REPLACEMENT HUMANA MEDICARE REPLACEMENT [id number]       Payor Plan Address Payor Plan Phone Number Payor Plan Fax Number Effective Dates    [address] [address] [phone]  1/1/2018 - None Entered    [facility] [address]         Subscriber Name Subscriber Birth Date Member ID       APARNA MATSON 1935 [id number]                     Emergency Contacts        (Rel.) Home Phone Work Phone Mobile Phone    TANJAMARILIAJUAN LEWIS (Son) -- -- [phone]    GONZALEZ MCLAIN (Daughter) [phone] -- [phone]                "

## 2023-08-28 NOTE — PROGRESS NOTES
Cardiology [facility]        LOS:  LOS: 0 days   Patient Name: Aparna Matson  Age/Sex: 88 y.o. female  : 1935  MRN: [id number]    Day of Service: 23   Length of Stay: 0  Encounter Provider: LEISA Jerez  Place of Service: [facility] CARDIOLOGY  Patient Care Team:  Matt Arana MD as PCP - General (Family Medicine)  Matt Arana MD as PCP - Family Medicine  AlexSurya MD as Consulting Physician (Cardiology)    Subjective:     Chief Complaint: f/u bradycardia    Subjective: Seen and examined, family at bedside, discussed pacemaker, indications, underlying sick sinus syndrome with junctional escape at times and indication for pacemaker.      Patient remains bradycardic with heart rates in the 40s despite stopping cardizem.  No other acute events chest pain-free blood pressure stable at this time    Current Medications:   Scheduled Meds:aspirin, 81 mg, Oral, Daily  atorvastatin, 10 mg, Oral, Nightly  cholecalciferol, 1,000 Units, Oral, Daily  levothyroxine, 100 mcg, Oral, Daily  senna-docusate sodium, 2 tablet, Oral, BID  sodium chloride, 10 mL, Intravenous, Q12H      Continuous Infusions:sodium chloride, 125 mL/hr, Last Rate: 125 mL/hr (23)        Allergies:  Allergies   Allergen Reactions    Codeine Hallucinations       Review of Systems   Constitutional: Negative for chills, diaphoresis and malaise/fatigue.   Cardiovascular:  Negative for chest pain, dyspnea on exertion, irregular heartbeat, leg swelling, near-syncope, orthopnea, palpitations, paroxysmal nocturnal dyspnea and syncope.   Respiratory:  Negative for cough, shortness of breath, sleep disturbances due to breathing and sputum production.    Gastrointestinal:  Negative for change in bowel habit.   Genitourinary:  Negative for urgency.   Neurological:  Positive for dizziness. Negative for headaches.   Psychiatric/Behavioral:  Negative for altered mental status.         Objective:     Temp:  [97.3 °F (36.3 °C)-98.2 °F (36.8 °C)] 97.6 °F (36.4 °C)  Heart Rate:  [43-50] 43  Resp:  [13-18] 13  BP: (104-153)/(52-73) 153/73   No intake or output data in the 24 hours ending 08/28/23 1028  Body mass index is 24.96 kg/m².      08/26/23  2356 08/27/23  0536   Weight: 68 kg (150 lb) 68 kg (150 lb)         General Appearance:    Alert, cooperative, in no acute distress                                Head: Atraumatic, normocephalic, PERRLA               Neck:   supple, no JVD   Lungs:     Clear to auscultation, respirations regular, even and               unlabored    Heart:    Regular rhythm and bradycardic rate, normal S1 and S2   Abdomen:     Normal bowel sounds, no masses, no organomegaly, soft  nontender, nondistended, no guarding, no rebound  tenderness   Extremities:   Moves all extremities well, no edema, no cyanosis, no  redness   Pulses:   Pulses palpable and equal bilaterally   Skin:   No bleeding, bruising or rash   Neurologic:   Awake, alert, oriented x3         Lab Review:   Results from last 7 days   Lab Units 08/27/23 2328 08/27/23 0028   SODIUM mmol/L 141 138   POTASSIUM mmol/L 3.8 3.4*   CHLORIDE mmol/L 108* 100   CO2 mmol/L 25.0 28.0   BUN mg/dL 21 22   CREATININE mg/dL 0.98 1.16*   GLUCOSE mg/dL 89 126*   CALCIUM mg/dL 8.9 9.8   AST (SGOT) U/L  --  13   ALT (SGPT) U/L  --  6     Results from last 7 days   Lab Units 08/27/23 0226 08/27/23  0028   HSTROP T ng/L 17* 13*     Results from last 7 days   Lab Units 08/27/23 2328 08/27/23 0028   WBC 10*3/mm3 6.50 10.00   HEMOGLOBIN g/dL 11.6* 13.3   HEMATOCRIT % 34.9 40.2   PLATELETS 10*3/mm3 116* 135*         Results from last 7 days   Lab Units 08/27/23 2328 08/27/23 0226   MAGNESIUM mg/dL 1.9 1.9           Invalid input(s): LDLCALC      Results from last 7 days   Lab Units 08/27/23  0028   TSH uIU/mL 0.949       Recent Radiology:  Imaging Results (Most Recent)       Procedure Component Value Units Date/Time    MRI  Brain Without Contrast [[id number]] Collected: 08/27/23 0807     Updated: 08/27/23 0811    Narrative:      MRI BRAIN WO CONTRAST    Date of Exam: 8/27/2023 7:30 AM EDT    Indication: dizziness.     Comparison: CT head from earlier today and MRI brain from April 22, 2019    Technique:  Routine multiplanar/multisequence sequence images of the brain were obtained without contrast administration.      Findings:  No acute infarction, intracranial hemorrhage, or extra-axial collection is identified. The ventricles appear normal in caliber, with no evidence of mass effect or midline shift. The basal cisterns appear patent.    The midline structures appear intact. The globes and orbits appear intact. The intracranial vascular flow-voids appear patent. There is mild generalized parenchymal atrophy. Scattered foci of periventricular and subcortical white matter FLAIR   hyperintensities are nonspecific, but likely the sequela of mild chronic small vessel ischemic disease.      Impression:      Impression:  1.No acute intracranial process identified.  2.Findings suggestive of mild chronic small vessel ischemic disease.        Electronically Signed: Devaughn Barboza MD    8/27/2023 8:09 AM EDT    Workstation ID: [id number]    CT Head Without Contrast [[id number]] Collected: 08/27/23 0330     Updated: 08/27/23 0333    Narrative:      CT HEAD WO CONTRAST    Date of Exam: 8/27/2023 3:15 AM EDT    Indication: dizzy.    Comparison: Brain MRI 4/22/2019. CT head 4/21/2019.    Technique: Axial CT images were obtained of the head without contrast administration.  Coronal reconstructions were performed.  Automated exposure control and iterative reconstruction methods were used.      Findings:  Superficial soft tissues appear within normal limits. The calvarium is intact.  Paranasal sinuses and mastoid air cells appear well aerated. There is thinning of the orbital lenses bilaterally. There is no acute intracranial hemorrhage.  No mass effect    or midline shift.  No abnormal extra-axial collections.  Gray-white differentiation is within normal limits. There is mild patchy periventricular white matter hypoattenuation and there is mild generalized parenchymal volume loss congruent with age. There   are mild intracranial vascular calcifications. Ventricular size and configuration is normal for age.      Impression:      Impression:    1. No acute intracranial abnormality.  2. Mild chronic small vessel ischemic change.      Electronically Signed: Babatunde Jewell MD    8/27/2023 3:31 AM EDT    Workstation ID: [id number]            ECHOCARDIOGRAM:    Results for orders placed during the hospital encounter of 04/21/21    Adult Transthoracic Echo Limited W/ Cont if Necessary Per Protocol    Interpretation Summary  · Left ventricular ejection fraction appears to be 61 - 65%.        I reviewed the patient's new clinical results.    EKG:      Assessment:       Dizziness    dizziness   -secondary to bradycardia     2. Bradycardia    3. paroxysmal afib in setting of COVID (2021)    4. hypokalemia, mild     5. hypertension     6. dyslipidemia     7. hypothyroidism     8. Chronic kidney disease     9. iliac artery aneurysm     10. history of breast CA,   - previous left breast lumpectomy, right mastectomy, partial thyroidectomy, hysterectomy     11. history of CAD in father     12. non smoker      Plan:   Symptomatic bradycardia  Consult EP for dual-chamber pacemaker  2D echo revealed normal EF with no wall motion abnormality on my review and interpretation    Has a history of pAfib, not on a/c at this time      Greater than 50% of time medical decision making, direct patient care exam and patient encounter performed by me  Surya Johnson MD, PhD    Renate Garrett, LEISA  08/28/23  10:28 EDT

## 2023-08-28 NOTE — PLAN OF CARE
Goal Outcome Evaluation:  Plan of Care Reviewed With: patient           Outcome Evaluation: 89 y/o F with h/o of paroxysmal A-fib, LE edema, chronic kidney impairment, left breast lumpectomy, right mastectomy, partial thyroidectomy presented to [facility] on 8/26/2023 c/o dizziness. EKG was positive for sinus bradycardia with 1st degree AV block with slight elevation in troponin. Possible pacemaker placement TBD. At baseline, pt lives at home alone and is I with functional mobility/ADLs without AD. This date, pt is I with bed mobility and requires SBA for functional transfers/ gait training for 200 ft with FWW. PT d/c recommendation of home with support from family as needed and  PT services pending pt progress at time of d/c.      Anticipated Discharge Disposition (PT): home with home health

## 2023-08-28 NOTE — PLAN OF CARE
Problem: Adult Inpatient Plan of Care  Goal: Plan of Care Review  Outcome: Ongoing, Progressing  Flowsheets (Taken 8/28/2023 1638)  Progress: no change  Plan of Care Reviewed With: patient  Goal: Patient-Specific Goal (Individualized)  Outcome: Ongoing, Progressing  Goal: Absence of Hospital-Acquired Illness or Injury  Outcome: Ongoing, Progressing  Intervention: Identify and Manage Fall Risk  Recent Flowsheet Documentation  Taken 8/28/2023 0859 by Tatum Pardo LPN  Safety Promotion/Fall Prevention: safety round/check completed  Intervention: Prevent Skin Injury  Recent Flowsheet Documentation  Taken 8/28/2023 0859 by Tatum Pardo LPN  Body Position: (up in chair) sitting up in bed  Goal: Optimal Comfort and Wellbeing  Outcome: Ongoing, Progressing  Intervention: Provide Person-Centered Care  Recent Flowsheet Documentation  Taken 8/28/2023 0859 by Tatum Pardo LPN  Trust Relationship/Rapport:   care explained   choices provided  Goal: Readiness for Transition of Care  Outcome: Ongoing, Progressing     Problem: Fall Injury Risk  Goal: Absence of Fall and Fall-Related Injury  Outcome: Ongoing, Progressing  Intervention: Promote Injury-Free Environment  Recent Flowsheet Documentation  Taken 8/28/2023 0859 by Tatum Pardo LPN  Safety Promotion/Fall Prevention: safety round/check completed   Goal Outcome Evaluation:  Plan of Care Reviewed With: patient        Progress: no change          Pt HR has been in the low 40's all day. Pt ambulated well throughout the shift, pt has no complaints. Still waiting for evaluation from Dr. Cadena for PPM. Family at bedside, will cont plan of care .

## 2023-08-28 NOTE — THERAPY EVALUATION
Patient Name: Aparna Matson  : 1935    MRN: [id number]                              Today's Date: 2023       Admit Date: 2023    Visit Dx:     ICD-10-CM ICD-9-CM   1. Dizziness  R42 780.4     Patient Active Problem List   Diagnosis    Hypertension    Hyperlipidemia    Hypothyroidism    Pneumonia due to COVID-19 virus    Abnormal blood chemistry level    Aneurysm of iliac artery    Blepharitis    Breast cyst, right    Ductal carcinoma in situ (DCIS) of breast    Hearing loss    Hx of mastectomy, right    Lens replaced by other means    Macular degeneration (senile) of retina    Memory impairment    Postmenopausal status    Senile nuclear sclerosis    Varicose veins of lower extremity    Acute kidney injury    Paroxysmal atrial fibrillation    Medicare annual wellness visit, subsequent    Dizziness     Past Medical History:   Diagnosis Date    Abnormal mammogram of right breast     Breast cyst, right     Breast lump     BREAST LUMPS    Chickenpox     Dizziness     Ductal carcinoma in situ of right breast     Elevated serum creatinine     Hearing loss     Hemorrhoids     Hyperlipidemia     Hypertension     Hypothyroidism     Iliac artery aneurysm, left     Knee pain, left     Leg cramps     Leg pain, left     Mass of breast, right     Measles     Memory impairment     Mumps     Other specified local infections of the skin and subcutaneous tissue     SORE AT LEFT ANKLE     Otitis media, acute     RIGHT     Postmenopausal status     Sinus disorder     SINUS    Skin lesion of face     LEFT TEMPLE     Skin lesion of wrist     LEFT FOREARM    Varicose veins of both lower extremities     Whooping cough      Past Surgical History:   Procedure Laterality Date    BREAST LUMPECTOMY Left     HYSTERECTOMY      MASTECTOMY Right 2017    DR. FUNG    THYROIDECTOMY, PARTIAL        General Information       Row Name [id number]          General Information    Patient Profile Reviewed yes  -RM      Prior Level of Function --  I with functional mobility/ ADLs at OF without AD. Assist from family provided with grocery shopping and transportation needs. Strong family support who check on pt frequently with one son living closeby.  -RM     Existing Precautions/Restrictions other (see comments)  Bradycardia; h/o dizziness  -RM       Row Name 08/28/23 1308          Living Environment    People in Home --  Pt was living at home alone with ramp onto deck, 1 YARELIS (no HR) to enter home, and 2 additional steps with HR into laundry room when is on main floor of home. Pt has walk-in shower with built in seat.  -RM       Row Name 08/28/23 1308          Cognition    Orientation Status (Cognition) oriented x 3  Family reports cognition is at baseline  -       Row Name 08/28/23 1308          Safety Issues, Functional Mobility    Impairments Affecting Function (Mobility) strength;endurance/activity tolerance  -RM               User Key  (r) = Recorded By, (t) = Taken By, (c) = Cosigned By      Initials Name Provider Type     Genevieve Duran, PT Physical Therapist                   Mobility       Row Name 08/28/23 1312          Bed Mobility    Bed Mobility bed mobility (all) activities;supine-sit;sit-supine  -RM     All Activities, [place] (Bed Mobility) independent  -RM     Supine-Sit [place] (Bed Mobility) independent  -RM     Sit-Supine [place] (Bed Mobility) independent  -RM       Row Name 08/28/23 1312          Bed-Chair Transfer    Bed-Chair [place] (Transfers) standby assist  -RM     Assistive Device (Bed-Chair Transfers) walker, front-wheeled  -RM       Row Name 08/28/23 1312          Sit-Stand Transfer    Sit-Stand [place] (Transfers) standby assist  -RM     Assistive Device (Sit-Stand Transfers) walker, front-wheeled  -RM       Row Name 08/28/23 1312          Gait/Stairs (Locomotion)    [place] Level (Gait) standby assist  -RM     Assistive Device (Gait) walker, front-wheeled  -RM      Distance in Feet (Gait) 200 ft  -RM     Deviations/Abnormal Patterns (Gait) base of support, narrow  -RM               User Key  (r) = Recorded By, (t) = Taken By, (c) = Cosigned By      Initials Name Provider Type    Genevieve Monzon, PT Physical Therapist                   Obj/Interventions       Row Name 08/28/23 1313          Range of Motion Comprehensive    General Range of Motion bilateral lower extremity ROM WFL  -RM       Row Name 08/28/23 1313          Strength Comprehensive (MMT)    Comment, General Manual Muscle Testing (MMT) Assessment MMT of BLE grossly 4-/5  -RM       Row Name 08/28/23 1313          Balance    Comment, Balance No LOB observed this date.  -RM               User Key  (r) = Recorded By, (t) = Taken By, (c) = Cosigned By      Initials Name Provider Type    Genevieve Monzon, PT Physical Therapist                   Goals/Plan       Row Name 08/28/23 1316          Transfer Goal 1 (PT)    Activity/Assistive Device (Transfer Goal 1, PT) transfers, all  -RM     [place] Level/Cues Needed (Transfer Goal 1, PT) modified independence  -RM     Time Frame (Transfer Goal 1, PT) long term goal (LTG);2 weeks  -RM       Row Name 08/28/23 1316          Gait Training Goal 1 (PT)    Activity/Assistive Device (Gait Training Goal 1, PT) gait (walking locomotion)  -RM     [place] Level (Gait Training Goal 1, PT) modified independence  -RM     Distance (Gait Training Goal 1, PT) 300 ft; use of most appropriate AD  -RM     Time Frame (Gait Training Goal 1, PT) long term goal (LTG);2 weeks  -RM       Row Name 08/28/23 1316          Stairs Goal 1 (PT)    Activity/Assistive Device (Stairs Goal 1, PT) stairs, all skills;ascending stairs;descending stairs  -RM     [place] Level/Cues Needed (Stairs Goal 1, PT) modified independence  -RM     Number of Stairs (Stairs Goal 1, PT) 1 step without HR and 2 steps with HR  -RM     Time Frame (Stairs Goal 1, PT) long term goal (LTG);2 weeks  -RM       Row Name  08/28/23 1316          Therapy Assessment/Plan (PT)    Planned Therapy Interventions (PT) gait training;patient/family education;stair training;strengthening;transfer training  -RM               User Key  (r) = Recorded By, (t) = Taken By, (c) = Cosigned By      Initials Name Provider Type    Genevieve Monzon, PT Physical Therapist                   Clinical Impression       Row Name 08/28/23 1314          Pain    Pre/Posttreatment Pain Comment No pain reported this date.  -RM       Row Name 08/28/23 1314          Plan of Care Review    Plan of Care Reviewed With patient  -RM     Outcome Evaluation 87 y/o F with h/o of paroxysmal A-fib, LE edema, chronic kidney impairment, left breast lumpectomy, right mastectomy, partial thyroidectomy presented [facility] on 8/26/2023 c/o dizziness. EKG was positive for sinus bradycardia with 1st degree AV block with slight elevation in troponin. Possible pacemaker placement TBD. At baseline, pt lives at home alone and is I with functional mobility/ADLs without AD. This date, pt is I with bed mobility and requires SBA for functional transfers/ gait training for 200 ft with FWW. PT d/c recommendation of home with support from family as needed and  PT services pending pt progress at time of d/c.  -RM       Row Name 08/28/23 1314          Therapy Assessment/Plan (PT)    Criteria for Skilled Interventions Met (PT) yes;skilled treatment is necessary  -RM     Therapy Frequency (PT) 5 times/wk  -RM     Predicted Duration of Therapy Intervention (PT) Until d/c  -RM       Row Name 08/28/23 1314          Positioning and Restraints    In Bed notified nsg;call light within reach;encouraged to call for assist  -RM               User Key  (r) = Recorded By, (t) = Taken By, (c) = Cosigned By      Initials Name Provider Type    Genevieve Monzon, PT Physical Therapist                   Outcome Measures       Row Name 08/28/23 1317 08/28/23 0882       How much help from another person  do you currently need...    Turning from your back to your side while in flat bed without using bedrails? 4  -RM 4  -MR    Moving from lying on back to sitting on the side of a flat bed without bedrails? 4  -RM 4  -MR    Moving to and from a bed to a chair (including a wheelchair)? 4  -RM 4  -MR    Standing up from a chair using your arms (e.g., wheelchair, bedside chair)? 4  -RM 4  -MR    Climbing 3-5 steps with a railing? 4  -RM 3  -MR    To walk in hospital room? 4  -RM 4  -MR    AM-PAC 6 Clicks Score (PT) 24  -RM 23  -MR    Highest level of mobility 8 --> Walked 250 feet or more  -RM 7 --> Walked 25 feet or more  -MR      Row Name 08/28/23 1317          Functional Assessment    Outcome Measure Options AM-PAC 6 Clicks Basic Mobility (PT)  -               User Key  (r) = Recorded By, (t) = Taken By, (c) = Cosigned By      Initials Name Provider Type    MR CharTatum LPN Licensed Nurse     Genevieve Duran, PT Physical Therapist                                 Physical Therapy Education       Title: PT OT SLP Therapies (In Progress)       Topic: Physical Therapy (In Progress)       Point: Mobility training (Done)       Learning Progress Summary             Patient Acceptance, E, VU by  at 8/28/2023 1317   Family Acceptance, E, VU by  at 8/28/2023 1317                         Point: Precautions (Not Started)       Learner Progress:  Not documented in this visit.                              User Key       Initials Effective Dates Name Provider Type Discipline     03/27/23 -  Genevieve Duran, MJ Physical Therapist PT                  PT Recommendation and Plan  Planned Therapy Interventions (PT): gait training, patient/family education, stair training, strengthening, transfer training  Plan of Care Reviewed With: patient  Outcome Evaluation: 89 y/o F with h/o of paroxysmal A-fib, LE edema, chronic kidney impairment, left breast lumpectomy, right mastectomy, partial thyroidectomy presented [facility] on 8/26/2023 c/o dizziness. EKG was positive for sinus bradycardia with 1st degree AV block with slight elevation in troponin. Possible pacemaker placement TBD. At baseline, pt lives at home alone and is I with functional mobility/ADLs without AD. This date, pt is I with bed mobility and requires SBA for functional transfers/ gait training for 200 ft with FWW. PT d/c recommendation of home with support from family as needed and  PT services pending pt progress at time of d/c.     Time Calculation:         PT Charges       Row Name 08/28/23 1319             Time Calculation    Start Time 1242  -RM      Stop Time 1304  -RM      Time Calculation (min) 22 min  -RM      PT Received On 08/28/23  -RM      PT - Next Appointment 08/29/23  -RM      PT Goal Re-Cert Due Date 09/11/23  -RM         Time Calculation- PT    Total Timed Code Minutes- PT 0 minute(s)  -RM                User Key  (r) = Recorded By, (t) = Taken By, (c) = Cosigned By      Initials Name Provider Type    Genevieve Monzon, PT Physical Therapist                  Therapy Charges for Today       Code Description Service Date Service Provider Modifiers Qty    [id number] HC PT EVAL LOW COMPLEXITY 4 8/28/2023 Genevieve Duran, PT GP 1            PT G-Codes  Outcome Measure Options: AM-PAC 6 Clicks Basic Mobility (PT)  AM-PAC 6 Clicks Score (PT): 24  AM-PAC 6 Clicks Score (OT): 22  PT Discharge Summary  Anticipated Discharge Disposition (PT): home with home health    Genevieve Duran PT  8/28/2023

## 2023-08-29 ENCOUNTER — ANESTHESIA (OUTPATIENT)
Dept: CARDIOLOGY | Facility: HOSPITAL | Age: 88
DRG: 244 | End: 2023-08-29
Payer: MEDICARE

## 2023-08-29 ENCOUNTER — APPOINTMENT (OUTPATIENT)
Dept: GENERAL RADIOLOGY | Facility: HOSPITAL | Age: 88
DRG: 244 | End: 2023-08-29
Payer: MEDICARE

## 2023-08-29 ENCOUNTER — ANESTHESIA EVENT (OUTPATIENT)
Dept: CARDIOLOGY | Facility: HOSPITAL | Age: 88
DRG: 244 | End: 2023-08-29
Payer: MEDICARE

## 2023-08-29 VITALS
DIASTOLIC BLOOD PRESSURE: 59 MMHG | OXYGEN SATURATION: 98 % | HEART RATE: 75 BPM | SYSTOLIC BLOOD PRESSURE: 113 MMHG | RESPIRATION RATE: 13 BRPM

## 2023-08-29 LAB
ANION GAP SERPL CALCULATED.3IONS-SCNC: 5 MMOL/L (ref 5–15)
BASOPHILS # BLD AUTO: 0.1 10*3/MM3 (ref 0–0.2)
BASOPHILS NFR BLD AUTO: 0.7 % (ref 0–1.5)
BH CV ECHO MEAS - ACS: 1.86 CM
BH CV ECHO MEAS - AO MAX PG: 14.9 MMHG
BH CV ECHO MEAS - AO MEAN PG: 7.6 MMHG
BH CV ECHO MEAS - AO ROOT DIAM: 3.2 CM
BH CV ECHO MEAS - AO V2 MAX: 192.9 CM/SEC
BH CV ECHO MEAS - AO V2 VTI: 44.6 CM
BH CV ECHO MEAS - AVA(I,D): 2.15 CM2
BH CV ECHO MEAS - EDV(CUBED): 47 ML
BH CV ECHO MEAS - EDV(MOD-SP2): 66.3 ML
BH CV ECHO MEAS - EDV(MOD-SP4): 58.5 ML
BH CV ECHO MEAS - EF(MOD-BP): 70 %
BH CV ECHO MEAS - EF(MOD-SP2): 70.8 %
BH CV ECHO MEAS - EF(MOD-SP4): 69.5 %
BH CV ECHO MEAS - ESV(CUBED): 19.9 ML
BH CV ECHO MEAS - ESV(MOD-SP2): 19.3 ML
BH CV ECHO MEAS - ESV(MOD-SP4): 17.9 ML
BH CV ECHO MEAS - FS: 24.9 %
BH CV ECHO MEAS - IVS/LVPW: 1.67 CM
BH CV ECHO MEAS - IVSD: 1.65 CM
BH CV ECHO MEAS - LA DIMENSION: 3.4 CM
BH CV ECHO MEAS - LV MASS(C)D: 163.7 GRAMS
BH CV ECHO MEAS - LV MAX PG: 9.5 MMHG
BH CV ECHO MEAS - LV MEAN PG: 4.6 MMHG
BH CV ECHO MEAS - LV V1 MAX: 153.9 CM/SEC
BH CV ECHO MEAS - LV V1 VTI: 34.4 CM
BH CV ECHO MEAS - LVIDD: 3.6 CM
BH CV ECHO MEAS - LVIDS: 2.7 CM
BH CV ECHO MEAS - LVOT AREA: 2.8 CM2
BH CV ECHO MEAS - LVOT DIAM: 1.88 CM
BH CV ECHO MEAS - LVPWD: 0.99 CM
BH CV ECHO MEAS - MV A MAX VEL: 121.9 CM/SEC
BH CV ECHO MEAS - MV DEC SLOPE: 409.7 CM/SEC2
BH CV ECHO MEAS - MV DEC TIME: 0.25 MSEC
BH CV ECHO MEAS - MV E MAX VEL: 103.5 CM/SEC
BH CV ECHO MEAS - MV E/A: 0.85
BH CV ECHO MEAS - MV MAX PG: 6.5 MMHG
BH CV ECHO MEAS - MV MEAN PG: 2.21 MMHG
BH CV ECHO MEAS - MV V2 VTI: 39.9 CM
BH CV ECHO MEAS - MVA(VTI): 2.41 CM2
BH CV ECHO MEAS - PA V2 MAX: 110 CM/SEC
BH CV ECHO MEAS - PI END-D VEL: 95.7 CM/SEC
BH CV ECHO MEAS - PULM A REVS DUR: 0.16 SEC
BH CV ECHO MEAS - PULM A REVS VEL: 27.7 CM/SEC
BH CV ECHO MEAS - PULM DIAS VEL: 55.1 CM/SEC
BH CV ECHO MEAS - PULM S/D: 1.16
BH CV ECHO MEAS - PULM SYS VEL: 64.1 CM/SEC
BH CV ECHO MEAS - RV MAX PG: 3.5 MMHG
BH CV ECHO MEAS - RV V1 MAX: 93 CM/SEC
BH CV ECHO MEAS - RV V1 VTI: 24 CM
BH CV ECHO MEAS - RVDD: 3.3 CM
BH CV ECHO MEAS - SV(LVOT): 96 ML
BH CV ECHO MEAS - SV(MOD-SP2): 47 ML
BH CV ECHO MEAS - SV(MOD-SP4): 40.6 ML
BH CV ECHO MEAS - TR MAX PG: 36.6 MMHG
BH CV ECHO MEAS - TR MAX VEL: 301.6 CM/SEC
BUN SERPL-MCNC: 19 MG/DL (ref 8–23)
BUN/CREAT SERPL: 17.9 (ref 7–25)
CALCIUM SPEC-SCNC: 9.7 MG/DL (ref 8.6–10.5)
CHLORIDE SERPL-SCNC: 107 MMOL/L (ref 98–107)
CO2 SERPL-SCNC: 28 MMOL/L (ref 22–29)
CREAT SERPL-MCNC: 1.06 MG/DL (ref 0.57–1)
DEPRECATED RDW RBC AUTO: 45.5 FL (ref 37–54)
EGFRCR SERPLBLD CKD-EPI 2021: 51.3 ML/MIN/1.73
EOSINOPHIL # BLD AUTO: 0.3 10*3/MM3 (ref 0–0.4)
EOSINOPHIL NFR BLD AUTO: 3.7 % (ref 0.3–6.2)
ERYTHROCYTE [DISTWIDTH] IN BLOOD BY AUTOMATED COUNT: 13.9 % (ref 12.3–15.4)
GLUCOSE SERPL-MCNC: 89 MG/DL (ref 65–99)
HCT VFR BLD AUTO: 36.9 % (ref 34–46.6)
HGB BLD-MCNC: 12.3 G/DL (ref 12–15.9)
LYMPHOCYTES # BLD AUTO: 2.3 10*3/MM3 (ref 0.7–3.1)
LYMPHOCYTES NFR BLD AUTO: 29.8 % (ref 19.6–45.3)
MCH RBC QN AUTO: 29.8 PG (ref 26.6–33)
MCHC RBC AUTO-ENTMCNC: 33.3 G/DL (ref 31.5–35.7)
MCV RBC AUTO: 89.5 FL (ref 79–97)
MONOCYTES # BLD AUTO: 0.7 10*3/MM3 (ref 0.1–0.9)
MONOCYTES NFR BLD AUTO: 9.8 % (ref 5–12)
NEUTROPHILS NFR BLD AUTO: 4.3 10*3/MM3 (ref 1.7–7)
NEUTROPHILS NFR BLD AUTO: 56 % (ref 42.7–76)
NRBC BLD AUTO-RTO: 0.1 /100 WBC (ref 0–0.2)
PLATELET # BLD AUTO: 114 10*3/MM3 (ref 140–450)
PMV BLD AUTO: 12.6 FL (ref 6–12)
POTASSIUM SERPL-SCNC: 4 MMOL/L (ref 3.5–5.2)
RBC # BLD AUTO: 4.12 10*6/MM3 (ref 3.77–5.28)
SODIUM SERPL-SCNC: 140 MMOL/L (ref 136–145)
WBC NRBC COR # BLD: 7.6 10*3/MM3 (ref 3.4–10.8)

## 2023-08-29 PROCEDURE — 25510000001 IOPAMIDOL PER 1 ML: Performed by: INTERNAL MEDICINE

## 2023-08-29 PROCEDURE — 02H63JZ INSERTION OF PACEMAKER LEAD INTO RIGHT ATRIUM, PERCUTANEOUS APPROACH: ICD-10-PCS | Performed by: INTERNAL MEDICINE

## 2023-08-29 PROCEDURE — C1785 PMKR, DUAL, RATE-RESP: HCPCS | Performed by: INTERNAL MEDICINE

## 2023-08-29 PROCEDURE — 85025 COMPLETE CBC W/AUTO DIFF WBC: CPT | Performed by: NURSE PRACTITIONER

## 2023-08-29 PROCEDURE — 02HK3JZ INSERTION OF PACEMAKER LEAD INTO RIGHT VENTRICLE, PERCUTANEOUS APPROACH: ICD-10-PCS | Performed by: INTERNAL MEDICINE

## 2023-08-29 PROCEDURE — 0JH606Z INSERTION OF PACEMAKER, DUAL CHAMBER INTO CHEST SUBCUTANEOUS TISSUE AND FASCIA, OPEN APPROACH: ICD-10-PCS | Performed by: INTERNAL MEDICINE

## 2023-08-29 PROCEDURE — 71045 X-RAY EXAM CHEST 1 VIEW: CPT

## 2023-08-29 PROCEDURE — 36415 COLL VENOUS BLD VENIPUNCTURE: CPT | Performed by: NURSE PRACTITIONER

## 2023-08-29 PROCEDURE — C1894 INTRO/SHEATH, NON-LASER: HCPCS | Performed by: INTERNAL MEDICINE

## 2023-08-29 PROCEDURE — 85025 COMPLETE CBC W/AUTO DIFF WBC: CPT | Performed by: INTERNAL MEDICINE

## 2023-08-29 PROCEDURE — 96360 HYDRATION IV INFUSION INIT: CPT | Performed by: INTERNAL MEDICINE

## 2023-08-29 PROCEDURE — 33208 INSRT HEART PM ATRIAL & VENT: CPT | Performed by: INTERNAL MEDICINE

## 2023-08-29 PROCEDURE — 25010000002 CEFAZOLIN PER 500 MG: Performed by: NURSE ANESTHETIST, CERTIFIED REGISTERED

## 2023-08-29 PROCEDURE — 25010000002 PROPOFOL 500 MG/50ML EMULSION: Performed by: NURSE ANESTHETIST, CERTIFIED REGISTERED

## 2023-08-29 PROCEDURE — C1898 LEAD, PMKR, OTHER THAN TRANS: HCPCS | Performed by: INTERNAL MEDICINE

## 2023-08-29 PROCEDURE — 99233 SBSQ HOSP IP/OBS HIGH 50: CPT | Performed by: INTERNAL MEDICINE

## 2023-08-29 PROCEDURE — 25010000002 HYDRALAZINE PER 20 MG: Performed by: INTERNAL MEDICINE

## 2023-08-29 PROCEDURE — 80048 BASIC METABOLIC PNL TOTAL CA: CPT | Performed by: NURSE PRACTITIONER

## 2023-08-29 PROCEDURE — 80048 BASIC METABOLIC PNL TOTAL CA: CPT | Performed by: INTERNAL MEDICINE

## 2023-08-29 DEVICE — LD PM TENDRIL STS 6F58CM 2088TC58: Type: IMPLANTABLE DEVICE | Status: FUNCTIONAL

## 2023-08-29 DEVICE — GEN PM ASSURITY MRI DR RF PM2272: Type: IMPLANTABLE DEVICE | Status: FUNCTIONAL

## 2023-08-29 DEVICE — LD PM TENDRIL STS 6F52CM 2088TC52: Type: IMPLANTABLE DEVICE | Status: FUNCTIONAL

## 2023-08-29 RX ORDER — LIDOCAINE HYDROCHLORIDE AND EPINEPHRINE BITARTRATE 20; .01 MG/ML; MG/ML
INJECTION, SOLUTION SUBCUTANEOUS
Status: DISCONTINUED | OUTPATIENT
Start: 2023-08-29 | End: 2023-08-29 | Stop reason: HOSPADM

## 2023-08-29 RX ORDER — HYDROCODONE BITARTRATE AND ACETAMINOPHEN 5; 325 MG/1; MG/1
1 TABLET ORAL EVERY 4 HOURS PRN
Status: DISCONTINUED | OUTPATIENT
Start: 2023-08-29 | End: 2023-08-30 | Stop reason: HOSPADM

## 2023-08-29 RX ORDER — CEFAZOLIN SODIUM 1 G/3ML
INJECTION, POWDER, FOR SOLUTION INTRAMUSCULAR; INTRAVENOUS AS NEEDED
Status: DISCONTINUED | OUTPATIENT
Start: 2023-08-29 | End: 2023-08-29 | Stop reason: SURG

## 2023-08-29 RX ORDER — PROPOFOL 10 MG/ML
INJECTION, EMULSION INTRAVENOUS CONTINUOUS PRN
Status: DISCONTINUED | OUTPATIENT
Start: 2023-08-29 | End: 2023-08-29 | Stop reason: SURG

## 2023-08-29 RX ORDER — SODIUM CHLORIDE 9 MG/ML
INJECTION, SOLUTION INTRAVENOUS CONTINUOUS PRN
Status: DISCONTINUED | OUTPATIENT
Start: 2023-08-29 | End: 2023-08-29 | Stop reason: SURG

## 2023-08-29 RX ORDER — NITROGLYCERIN 0.4 MG/1
0.4 TABLET SUBLINGUAL
Status: DISCONTINUED | OUTPATIENT
Start: 2023-08-29 | End: 2023-08-29 | Stop reason: SDUPTHER

## 2023-08-29 RX ADMIN — SODIUM CHLORIDE: 9 INJECTION, SOLUTION INTRAVENOUS at 17:17

## 2023-08-29 RX ADMIN — SENNOSIDES AND DOCUSATE SODIUM 2 TABLET: 50; 8.6 TABLET ORAL at 21:01

## 2023-08-29 RX ADMIN — PROPOFOL 100 MG: 10 INJECTION, EMULSION INTRAVENOUS at 17:31

## 2023-08-29 RX ADMIN — CEFAZOLIN 2 G: 1 INJECTION, POWDER, FOR SOLUTION INTRAMUSCULAR; INTRAVENOUS at 17:30

## 2023-08-29 RX ADMIN — HYDRALAZINE HYDROCHLORIDE 10 MG: 20 INJECTION INTRAMUSCULAR; INTRAVENOUS at 21:01

## 2023-08-29 RX ADMIN — HYDROCODONE BITARTRATE AND ACETAMINOPHEN 1 TABLET: 5; 325 TABLET ORAL at 21:12

## 2023-08-29 RX ADMIN — LEVOTHYROXINE SODIUM 100 MCG: 0.1 TABLET ORAL at 05:45

## 2023-08-29 RX ADMIN — PROPOFOL 100 MCG/KG/MIN: 10 INJECTION, EMULSION INTRAVENOUS at 17:22

## 2023-08-29 RX ADMIN — PROPOFOL 100 MG: 10 INJECTION, EMULSION INTRAVENOUS at 17:40

## 2023-08-29 RX ADMIN — ATORVASTATIN CALCIUM 10 MG: 10 TABLET, FILM COATED ORAL at 21:01

## 2023-08-29 RX ADMIN — Medication 10 ML: at 09:04

## 2023-08-29 RX ADMIN — SODIUM CHLORIDE 125 ML/HR: 9 INJECTION, SOLUTION INTRAVENOUS at 21:27

## 2023-08-29 RX ADMIN — Medication 10 ML: at 21:02

## 2023-08-29 NOTE — PLAN OF CARE
Goal Outcome Evaluation:                      No change in status. Pt alert and oriented. VSS, call light within reach. Pt allowed to eat breakfast then will become NPO for pacemaker placement. Plan of care ongoing.

## 2023-08-29 NOTE — PLAN OF CARE
Goal Outcome Evaluation:            Patient resting in bed. No complaints of pain or discomfort. Plans for pacemaker placement today. No other complaints. Will continue to monitor.

## 2023-08-29 NOTE — PROGRESS NOTES
[facility]        LOS:  LOS: 0 days   Patient Name: Aparna Maston  Age/Sex: 88 y.o. female  : 1935  MRN: [id number]    Day of Service: 23   Length of Stay: 0  Encounter Provider: LEISA Jerez  Place of Service: [facility] CARDIOLOGY  Patient Care Team:  Matt Arana MD as PCP - General (Family Medicine)  Matt Arana MD as PCP - Family Medicine  AlexSurya de la vega MD as Consulting Physician (Cardiology)    Subjective:     Chief Complaint: f/u dizziness, bradycardia    Subjective: No acute events overnight, blood pressure stable despite low heart rates, remains bradycardic, plan for PPM today    Current Medications:   Scheduled Meds:aspirin, 81 mg, Oral, Daily  atorvastatin, 10 mg, Oral, Nightly  cholecalciferol, 1,000 Units, Oral, Daily  levothyroxine, 100 mcg, Oral, Daily  senna-docusate sodium, 2 tablet, Oral, BID  sodium chloride, 10 mL, Intravenous, Q12H      Continuous Infusions:sodium chloride, 125 mL/hr, Last Rate: 125 mL/hr (23)        Allergies:  Allergies   Allergen Reactions    Codeine Hallucinations       Review of Systems   Constitutional: Negative for chills, diaphoresis and malaise/fatigue.   Cardiovascular:  Negative for chest pain, dyspnea on exertion, irregular heartbeat, leg swelling, near-syncope, orthopnea, palpitations, paroxysmal nocturnal dyspnea and syncope.   Respiratory:  Negative for cough, shortness of breath, sleep disturbances due to breathing and sputum production.    Gastrointestinal:  Negative for change in bowel habit.   Genitourinary:  Negative for urgency.   Neurological:  Positive for dizziness. Negative for headaches.   Psychiatric/Behavioral:  Negative for altered mental status.        Objective:     Temp:  [96.8 °F (36 °C)-98 °F (36.7 °C)] 97.5 °F (36.4 °C)  Heart Rate:  [44-59] 45  Resp:  [12-19] 19  BP: (103-164)/() 157/85     Intake/Output Summary (Last 24 hours) at 2023  0945  Last data filed at 8/29/2023 0859  Gross per 24 hour   Intake 840 ml   Output --   Net 840 ml     Body mass index is 25.68 kg/m².      08/27/23  0536 08/28/23  1538 08/29/23  0425   Weight: 68 kg (150 lb) 68 kg (150 lb) 70 kg (154 lb 5.2 oz)         General Appearance:    Alert, cooperative, in no acute distress                                Head: Atraumatic, normocephalic, PERRLA               Neck:   supple, trachea midline, no thyromegaly, no carotid bruit, no JVD   Lungs:     Clear to auscultation, respirations regular, even and               unlabored    Heart:    Regular rhythm and bradycardic rate, normal S1 and S2   Abdomen:     Normal bowel sounds, no masses, no organomegaly, soft  nontender, nondistended, no guarding, no rebound  tenderness   Extremities:   Moves all extremities well, no edema, no cyanosis, no  redness   Pulses:   Pulses palpable and equal bilaterally   Skin:   No bleeding, bruising or rash   Neurologic:   Awake, alert, oriented x3     Unchanged from prior encounter    Lab Review:   Results from last 7 days   Lab Units 08/29/23 0317 08/27/23 2328 08/27/23 0028   SODIUM mmol/L 140 141 138   POTASSIUM mmol/L 4.0 3.8 3.4*   CHLORIDE mmol/L 107 108* 100   CO2 mmol/L 28.0 25.0 28.0   BUN mg/dL 19 21 22   CREATININE mg/dL 1.06* 0.98 1.16*   GLUCOSE mg/dL 89 89 126*   CALCIUM mg/dL 9.7 8.9 9.8   AST (SGOT) U/L  --   --  13   ALT (SGPT) U/L  --   --  6     Results from last 7 days   Lab Units 08/27/23 0226 08/27/23  0028   HSTROP T ng/L 17* 13*     Results from last 7 days   Lab Units 08/29/23 0317 08/27/23 2328   WBC 10*3/mm3 7.60 6.50   HEMOGLOBIN g/dL 12.3 11.6*   HEMATOCRIT % 36.9 34.9   PLATELETS 10*3/mm3 114* 116*         Results from last 7 days   Lab Units 08/27/23 2328 08/27/23 0226   MAGNESIUM mg/dL 1.9 1.9           Invalid input(s): LDLCALC      Results from last 7 days   Lab Units 08/27/23  0028   TSH uIU/mL 0.949       Recent Radiology:  Imaging Results (Most Recent)        Procedure Component Value Units Date/Time    MRI Brain Without Contrast [[id number]] Collected: 08/27/23 0807     Updated: 08/27/23 0811    Narrative:      MRI BRAIN WO CONTRAST    Date of Exam: 8/27/2023 7:30 AM EDT    Indication: dizziness.     Comparison: CT head from earlier today and MRI brain from April 22, 2019    Technique:  Routine multiplanar/multisequence sequence images of the brain were obtained without contrast administration.      Findings:  No acute infarction, intracranial hemorrhage, or extra-axial collection is identified. The ventricles appear normal in caliber, with no evidence of mass effect or midline shift. The basal cisterns appear patent.    The midline structures appear intact. The globes and orbits appear intact. The intracranial vascular flow-voids appear patent. There is mild generalized parenchymal atrophy. Scattered foci of periventricular and subcortical white matter FLAIR   hyperintensities are nonspecific, but likely the sequela of mild chronic small vessel ischemic disease.      Impression:      Impression:  1.No acute intracranial process identified.  2.Findings suggestive of mild chronic small vessel ischemic disease.        Electronically Signed: Devaughn Barboza MD    8/27/2023 8:09 AM EDT    Workstation ID: [id number]    CT Head Without Contrast [[id number]] Collected: 08/27/23 0330     Updated: 08/27/23 0333    Narrative:      CT HEAD WO CONTRAST    Date of Exam: 8/27/2023 3:15 AM EDT    Indication: dizzy.    Comparison: Brain MRI 4/22/2019. CT head 4/21/2019.    Technique: Axial CT images were obtained of the head without contrast administration.  Coronal reconstructions were performed.  Automated exposure control and iterative reconstruction methods were used.      Findings:  Superficial soft tissues appear within normal limits. The calvarium is intact.  Paranasal sinuses and mastoid air cells appear well aerated. There is thinning of the orbital lenses bilaterally. There  is no acute intracranial hemorrhage.  No mass effect   or midline shift.  No abnormal extra-axial collections.  Gray-white differentiation is within normal limits. There is mild patchy periventricular white matter hypoattenuation and there is mild generalized parenchymal volume loss congruent with age. There   are mild intracranial vascular calcifications. Ventricular size and configuration is normal for age.      Impression:      Impression:    1. No acute intracranial abnormality.  2. Mild chronic small vessel ischemic change.      Electronically Signed: Babatunde Jewell MD    8/27/2023 3:31 AM EDT    Workstation ID: [id number]            ECHOCARDIOGRAM:    Results for orders placed during the hospital encounter of 04/21/21    Adult Transthoracic Echo Limited W/ Cont if Necessary Per Protocol    Interpretation Summary  · Left ventricular ejection fraction appears to be 61 - 65%.        I reviewed the patient's new clinical results.    EKG:      Assessment:       Dizziness    Sick sinus syndrome    dizziness   -secondary to bradycardia      2. Bradycardia, sick sinus syndrome     3. paroxysmal afib in setting of COVID (2021)     4. hypokalemia, mild      5. hypertension      6. dyslipidemia      7. hypothyroidism      8. Chronic kidney disease      9. iliac artery aneurysm      10. history of breast CA,   - previous left breast lumpectomy, right mastectomy, partial thyroidectomy, hysterectomy      11. history of CAD in father      12. non smoker      Plan:   Symptomatic bradycardia  Pacemaker today  Continue to follow after dual-chamber device is placed  Observe overnight, standard postprocedural chest x-ray and device interrogation tomorrow  Blood pressures are stable        Greater than 50% of encounter time exam and medical decision making performed by me directly    Surya Johnson MD, PhD  LEISA Jerez  08/29/23  09:45 EDT

## 2023-08-29 NOTE — SIGNIFICANT NOTE
08/29/23 1343   OTHER   Discipline occupational therapist   Rehab Time/Intention   Session Not Performed other (see comments);unable to treat, medical status change  (Pt to have pacemaker placed this evening, OT will continue acute services post surgery.)   Recommendation   OT - Next Appointment 08/30/23

## 2023-08-29 NOTE — ANESTHESIA PREPROCEDURE EVALUATION
Anesthesia Evaluation     Patient summary reviewed and Nursing notes reviewed   NPO Solid Status: > 8 hours  NPO Liquid Status: > 8 hours           Airway   Mallampati: II  TM distance: >3 FB  Neck ROM: full  No difficulty expected  Dental - normal exam     Pulmonary    Cardiovascular     (+) hypertension, dysrhythmias Tachycardia, Bradycardia, Paroxysmal Atrial Fib, hyperlipidemia      Neuro/Psych  (+) dizziness/light headedness  GI/Hepatic/Renal/Endo    (+) renal disease, thyroid problem     Musculoskeletal     Abdominal    Substance History      OB/GYN          Other      history of cancer                  Anesthesia Plan    ASA 3     general and MAC     intravenous induction     Anesthetic plan, risks, benefits, and alternatives have been provided, discussed and informed consent has been obtained with: patient.    Plan discussed with CRNA.    CODE STATUS:    Medical Intervention Limits: NO intubation (DNI)  Code Status (Patient has no pulse and is not breathing): No CPR (Do Not Attempt to Resuscitate)  Medical Interventions (Patient has pulse or is breathing): Limited Support

## 2023-08-29 NOTE — CONSULTS
HP      Name: Aparna Matson ADMIT: 2023   : 1935  PCP: Matt Arana MD    MRN: [id number] LOS: 0 days   AGE/SEX: 88 y.o. female  ROOM: [facility]     Chief Complaint   Patient presents with    Vomiting       Subjective        History of present illness  Aparna Matson is an 88-year-old female patient was history of right breast cancer, atrial fibrillation in  which seem to have been associated with COVID infection at the time, is admitted to the hospital due to dizzy spells and weakness.  Prior to admission, she was sitting at her desk and became extremely dizzy and went to the couch and could not get up for a couple of hours.  This has happened also a few weeks back but she had recovered from it more quickly.  Upon presentation she was found to be bradycardic, heart rate in the 40s.  At home she had been on Cardizem  mg which was discontinued on admission.  Patient however remains bradycardic.  Past Medical History:   Diagnosis Date    Abnormal mammogram of right breast     Breast cyst, right     Breast lump     BREAST LUMPS    Chickenpox     Dizziness     Ductal carcinoma in situ of right breast     Elevated serum creatinine     Hearing loss     Hemorrhoids     Hyperlipidemia     Hypertension     Hypothyroidism     Iliac artery aneurysm, left     Knee pain, left     Leg cramps     Leg pain, left     Mass of breast, right     Measles     Memory impairment     Mumps     Other specified local infections of the skin and subcutaneous tissue     SORE AT LEFT ANKLE     Otitis media, acute     RIGHT     Postmenopausal status     Sinus disorder     SINUS    Skin lesion of face     LEFT TEMPLE     Skin lesion of wrist     LEFT FOREARM    Varicose veins of both lower extremities     Whooping cough      Past Surgical History:   Procedure Laterality Date    BREAST LUMPECTOMY Left     HYSTERECTOMY      MASTECTOMY Right 2017    DR. FUNG    THYROIDECTOMY, PARTIAL       Family History    Problem Relation Age of Onset    Heart attack Father 67        AMI    Aneurysm Brother         RUPTURED     Heart disease Other     Hypertension Other     Anuerysm Other         AAA- ABDOMINAL AORTIC ANUERYSM     Diabetes Other      Social History     Tobacco Use    Smoking status: Never    Smokeless tobacco: Never   Vaping Use    Vaping Use: Never used   Substance Use Topics    Alcohol use: Not Currently    Drug use: Never     Medications Prior to Admission   Medication Sig Dispense Refill Last Dose    aspirin 81 MG EC tablet Take 1 tablet by mouth Daily.       cholecalciferol (VITAMIN D3) 25 MCG (1000 UT) tablet Take 1 tablet by mouth Daily.       dilTIAZem CD (CARDIZEM CD) 120 MG 24 hr capsule TAKE 1 CAPSULE EVERY DAY 90 capsule 3     losartan-hydrochlorothiazide (Hyzaar) 50-12.5 MG per tablet Take 1 tablet by mouth Daily. 90 tablet 3     pravastatin (PRAVACHOL) 40 MG tablet TAKE 1 TABLET EVERY DAY 90 tablet 3     Synthroid 100 MCG tablet TAKE 1 TABLET EVERY DAY 90 tablet 3      Allergies:  Codeine    Review of systems    Constitutional: Negative.    Respiratory and cardiovascular: As detailed in HPI section.  Gastrointestinal: Negative for constipation, nausea and vomiting negative for abdominal distention, abdominal pain and diarrhea.   Genitourinary: Negative for difficulty urinating and flank pain.   Musculoskeletal: Negative for arthralgias, joint swelling and myalgias.   Skin: Negative for color change, rash and wound.   Neurological: Negative for dizziness, syncope, weakness and headaches.   Hematological: Negative for adenopathy.   Psychiatric/Behavioral: Negative for confusion.   All other systems reviewed and are negative.       Physical Exam  VITALS REVIEWED    General:      well developed, in no acute distress.    Head:      normocephalic and atraumatic.    Eyes:      PERRL/EOM intact, conjunctiva and sclera clear with out nystagmus.    Neck:      no masses, thyromegaly,  trachea central with normal  respiratory effort and PMI displaced laterally  Lungs:      Clear to auscultation bilaterally  Heart:       Regular rate and rhythm  Msk:      no deformity or scoliosis noted of thoracic or lumbar spine.    Pulses:      pulses normal in all 4 extremities.    Extremities:       No lower extremity edema  Neurologic:      no focal deficits.   alert oriented x3  Skin:      intact without lesions or rashes.    Psych:      alert and cooperative; normal mood and affect; normal attention span and concentration.      Result Review :               Pertinent cardiac workup    EKG 8/27/2023 sinus bradycardia heart rate 48 bpm.  Echocardiogram 8/28/2023, normal EF.  EKG 4/23/2021 atrial fibrillation ventricular rate of 138 bpm.        Assessment and Plan         Dizziness    Sick sinus syndrome      Aparna Matson is an 88-year-old female patient with no prior history of coronary artery disease, she presented to the hospital due to extreme dizziness and weakness.  She was found to be bradycardic heart rate consistently staying in the 40s.  Sinus rhythm, narrow QRS.  She had been on Cardizem which I am assuming she was taking since her episode of A-fib back in April 2021.  It was stopped on admission and 48 hours later she still bradycardic.  Her TSH was within range.  Her presentation along with the constant bradycardia, constitute sick sinus syndrome and I think that the patient would benefit from implantation of a dual-chamber pacemaker.        No follow-ups on file.  Patient was given instructions and counseling regarding her condition or for health maintenance advice. Please see specific information pulled into the AVS if appropriate.

## 2023-08-29 NOTE — SIGNIFICANT NOTE
08/29/23 1329   OTHER   Discipline physical therapist   Rehab Time/Intention   Session Not Performed unable to treat, medical status change;other (see comments)  (Pt to have PPM placed this PM. Will f/u in AM for continued therapy services and discharge planning.)   Recommendation   PT - Next Appointment 08/30/23

## 2023-08-29 NOTE — PROGRESS NOTES
[facility] Medicine Services   Daily Progress Note    Patient Name: Aparna Matson  : 1935  MRN: [id number]  Primary Care Physician:  Matt Arana MD  Date of admission: 2023  Date and Time of Service: 23  at 16:10 EDT       Subjective      Chief Complaint:Dizziness       HPI:   Aparna Matson is a 88 y.o. female past medical history of hypertension, hyperlipidemia, hypothyroidism, paroxysmal A-fib and chronic kidney impairment who presented to [facility] on 2023 complaining of dizziness     Patient was seen with daughter at bedside.  Patient reports she was sitting at her computer and started feeling unwell.  She got up to walk to the couch and felt dizzy and off-balance and felt as if was going to fall.  Denied loss of consciousness. She called her daughter around 10 PM stating that she was unable to get off the couch as she has been sitting there for approximately 2 hours.  She denies any chest pain shortness of breath abdominal pain nausea or vomiting or focal deficit.  Denies headache or visual changes.  Daughter at bedside reports patient with decreased oral intake.  Unsure of medications if still currently taking losartan hydrochlorothiazide.     In the ED orthostatic vitals were taken lying down heart rate 55 blood pressure 146/73 sitting heart rate 56 blood pressure 151/68 standing heart rate 57 with blood pressure 140/72.  EKG was positive for sinus bradycardia with heart rate 48.  Slight elevation in troponin from 13-17.  Potassium 3.4 BUN 22 and creatinine 1.16 patient does take losartan hydrochlorothiazide.  Per discussion with the ED physician patient has no focal deficits.  Negative for orthostasis.  No infectious etiology.  No nystagmus.  Upon walking did appear unsteady.  Have ordered an MRI to rule out central etiology.  We will consult PT OT and patient has been admitted for further observation.        Interval History:   23 :  Patient  was seen at bedside this morning. Overnight patient has been bradycardic on telemetry with heart rate upto 40s. She c/o dizziness on standing up. Denies any chest pain shortness of breath abdominal pain nausea vomiting.    08/29/23   Patient was seen at bedside this morning.  She denies any new symptoms.  The dizziness has improved.  There were no acute events overnight.        Objective       Vitals:   Temp:  [96.8 °F (36 °C)-98 °F (36.7 °C)] 97.5 °F (36.4 °C)  Heart Rate:  [44-59] 46  Resp:  [12-20] 20  BP: (103-164)/(76-93) 155/85    Physical Exam  Constitutional:       Appearance: Normal appearance.   HENT:      Head: Normocephalic and atraumatic.      Nose: Nose normal.      Mouth/Throat:      Mouth: Mucous membranes are moist.   Eyes:      Extraocular Movements: Extraocular movements intact.      Pupils: Pupils are equal, round, and reactive to light.   Cardiovascular:      Rate and Rhythm: Regular rhythm. Bradycardia present.      Pulses: Normal pulses.      Heart sounds: Normal heart sounds.   Pulmonary:      Effort: Pulmonary effort is normal.      Breath sounds: Normal breath sounds.   Abdominal:      General: Bowel sounds are normal.      Palpations: Abdomen is soft.   Musculoskeletal:         General: Normal range of motion.      Cervical back: Normal range of motion.   Skin:     General: Skin is warm.      Capillary Refill: Capillary refill takes less than 2 seconds.   Neurological:      General: No focal deficit present.      Mental Status: She is alert and oriented to person, place, and time.   Psychiatric:         Mood and Affect: Mood normal.           Result Review:  I have personally reviewed the results from the time of this admission to 8/29/2023 16:10 EDT and agree with these findings:  [x]  Laboratory  [x]  Microbiology  [x]  Radiology  [x]  EKG/Telemetry   [x]  Cardiology/Vascular   []  Pathology  [x]  Old records  []  Other:  Most notable findings include:   EKG (08/27):   HEART RATE= 48   bpm  RR Interval= 1272  ms  VA Interval= 208  ms  P Horizontal Axis= -2  deg  P Front Axis= 52  deg  QRSD Interval= 105  ms  QT Interval= 496  ms  QTcB= 440  ms  QRS Axis= 17  deg  T Wave Axis= 55  deg  - ABNORMAL ECG -  Sinus bradycardia  Atrial premature complexes                    Assessment & Plan      Brief Patient Summary:  Aparna Matson is a 88 y.o. female admitted with dizziness and symptomatic bradycardia.      Current Medications:  aspirin, 81 mg, Oral, Daily  atorvastatin, 10 mg, Oral, Nightly  cholecalciferol, 1,000 Units, Oral, Daily  levothyroxine, 100 mcg, Oral, Daily  senna-docusate sodium, 2 tablet, Oral, BID  sodium chloride, 10 mL, Intravenous, Q12H       sodium chloride, 125 mL/hr, Last Rate: 125 mL/hr (08/27/23 2223)         Active Hospital Problems:  Active Hospital Problems    Diagnosis     **Dizziness     Sick sinus syndrome        Plan:   # Dizziness due to Bradycardia  - CT/MRI head negative for acute pathology  - Cardiology consulted  - sinus bradycardia on EKG  - Echo pending  - TSH - 0.948  - Cardizem discontinued  - Continue telemetry  - Patient still bradycardic to 40s --> EP consulted plan for pacemaker placement today  - negative for orthostasis  - PT/OT  - Monitor Electrolytes      # Chronic renal insufficiency  - Creatinine at baseline       # h/o Paroxysmal Afib in the setting of COVID  - Was not placed on AC  - continue aspirin, statin  - EP eval    # HTN  - Home antihypertensives were held due to elevated creatinine on admission  - Cr at baseline  - Consider resuming home Hyzaar if BP persistently elevated  - monitor BP    # Hypokalemia - Resolved  - monitor electrolytes    # Hypothyroid  - continue synthroid     # HLD  - on statin     DVT prophylaxis:  Mechanical DVT prophylaxis orders are present.      CODE STATUS:    Medical Intervention Limits: NO intubation (DNI)  Code Status (Patient has no pulse and is not breathing): No CPR (Do Not Attempt to Resuscitate)  Medical  Interventions (Patient has pulse or is breathing): Limited Support        Disposition:  I expect patient to be discharged home tomorrow after pacemaker placement.  Discussed the plan of care with patient and her family at bedside. Discussed with RN and .    Electronically signed by eLonel Gray MD, 08/29/23, 16:10 EDT.  [facility] Hospitalist Team

## 2023-08-30 ENCOUNTER — DOCUMENTATION (OUTPATIENT)
Dept: HOME HEALTH SERVICES | Facility: HOME HEALTHCARE | Age: 88
End: 2023-08-30
Payer: MEDICARE

## 2023-08-30 ENCOUNTER — HOME HEALTH ADMISSION (OUTPATIENT)
Dept: HOME HEALTH SERVICES | Facility: HOME HEALTHCARE | Age: 88
End: 2023-08-30
Payer: MEDICARE

## 2023-08-30 ENCOUNTER — READMISSION MANAGEMENT (OUTPATIENT)
Dept: CALL CENTER | Facility: HOSPITAL | Age: 88
End: 2023-08-30
Payer: MEDICARE

## 2023-08-30 VITALS
TEMPERATURE: 98.1 F | RESPIRATION RATE: 17 BRPM | HEART RATE: 60 BPM | DIASTOLIC BLOOD PRESSURE: 79 MMHG | SYSTOLIC BLOOD PRESSURE: 140 MMHG | OXYGEN SATURATION: 98 % | BODY MASS INDEX: 26.81 KG/M2 | WEIGHT: 160.94 LBS | HEIGHT: 65 IN

## 2023-08-30 PROBLEM — Z95.0 STATUS POST PLACEMENT OF CARDIAC PACEMAKER: Status: ACTIVE | Noted: 2023-08-30

## 2023-08-30 LAB
ANION GAP SERPL CALCULATED.3IONS-SCNC: 12 MMOL/L (ref 5–15)
BASOPHILS # BLD AUTO: 0.1 10*3/MM3 (ref 0–0.2)
BASOPHILS NFR BLD AUTO: 0.7 % (ref 0–1.5)
BUN SERPL-MCNC: 18 MG/DL (ref 8–23)
BUN/CREAT SERPL: 20 (ref 7–25)
CALCIUM SPEC-SCNC: 9.2 MG/DL (ref 8.6–10.5)
CHLORIDE SERPL-SCNC: 106 MMOL/L (ref 98–107)
CO2 SERPL-SCNC: 21 MMOL/L (ref 22–29)
CREAT SERPL-MCNC: 0.9 MG/DL (ref 0.57–1)
DEPRECATED RDW RBC AUTO: 47.3 FL (ref 37–54)
EGFRCR SERPLBLD CKD-EPI 2021: 62.4 ML/MIN/1.73
EOSINOPHIL # BLD AUTO: 0.1 10*3/MM3 (ref 0–0.4)
EOSINOPHIL NFR BLD AUTO: 1.3 % (ref 0.3–6.2)
ERYTHROCYTE [DISTWIDTH] IN BLOOD BY AUTOMATED COUNT: 13.9 % (ref 12.3–15.4)
GLUCOSE SERPL-MCNC: 83 MG/DL (ref 65–99)
HCT VFR BLD AUTO: 39.1 % (ref 34–46.6)
HGB BLD-MCNC: 12.9 G/DL (ref 12–15.9)
LYMPHOCYTES # BLD AUTO: 1.7 10*3/MM3 (ref 0.7–3.1)
LYMPHOCYTES NFR BLD AUTO: 17.8 % (ref 19.6–45.3)
MCH RBC QN AUTO: 30.3 PG (ref 26.6–33)
MCHC RBC AUTO-ENTMCNC: 32.9 G/DL (ref 31.5–35.7)
MCV RBC AUTO: 91.9 FL (ref 79–97)
MONOCYTES # BLD AUTO: 0.7 10*3/MM3 (ref 0.1–0.9)
MONOCYTES NFR BLD AUTO: 7.8 % (ref 5–12)
NEUTROPHILS NFR BLD AUTO: 6.8 10*3/MM3 (ref 1.7–7)
NEUTROPHILS NFR BLD AUTO: 72.4 % (ref 42.7–76)
NRBC BLD AUTO-RTO: 0.1 /100 WBC (ref 0–0.2)
PLATELET # BLD AUTO: 114 10*3/MM3 (ref 140–450)
PMV BLD AUTO: 12.8 FL (ref 6–12)
POTASSIUM SERPL-SCNC: 3.7 MMOL/L (ref 3.5–5.2)
RBC # BLD AUTO: 4.25 10*6/MM3 (ref 3.77–5.28)
SODIUM SERPL-SCNC: 139 MMOL/L (ref 136–145)
WBC NRBC COR # BLD: 9.4 10*3/MM3 (ref 3.4–10.8)

## 2023-08-30 PROCEDURE — 97530 THERAPEUTIC ACTIVITIES: CPT

## 2023-08-30 PROCEDURE — 97116 GAIT TRAINING THERAPY: CPT

## 2023-08-30 PROCEDURE — 25010000002 CEFAZOLIN PER 500 MG: Performed by: INTERNAL MEDICINE

## 2023-08-30 PROCEDURE — 99024 POSTOP FOLLOW-UP VISIT: CPT | Performed by: NURSE PRACTITIONER

## 2023-08-30 PROCEDURE — 99232 SBSQ HOSP IP/OBS MODERATE 35: CPT | Performed by: INTERNAL MEDICINE

## 2023-08-30 PROCEDURE — 25010000002 HYDRALAZINE PER 20 MG: Performed by: INTERNAL MEDICINE

## 2023-08-30 RX ORDER — CEPHALEXIN 500 MG/1
500 CAPSULE ORAL 2 TIMES DAILY
Qty: 10 CAPSULE | Refills: 0 | Status: SHIPPED | OUTPATIENT
Start: 2023-08-30 | End: 2023-09-04

## 2023-08-30 RX ORDER — HYDROCHLOROTHIAZIDE 12.5 MG/1
12.5 TABLET ORAL
Status: DISCONTINUED | OUTPATIENT
Start: 2023-08-30 | End: 2023-08-30 | Stop reason: HOSPADM

## 2023-08-30 RX ORDER — HYDROCODONE BITARTRATE AND ACETAMINOPHEN 5; 325 MG/1; MG/1
1 TABLET ORAL EVERY 4 HOURS PRN
Qty: 30 TABLET | Refills: 0 | Status: SHIPPED | OUTPATIENT
Start: 2023-08-30 | End: 2023-09-04

## 2023-08-30 RX ORDER — LOSARTAN POTASSIUM 50 MG/1
50 TABLET ORAL
Status: DISCONTINUED | OUTPATIENT
Start: 2023-08-30 | End: 2023-08-30 | Stop reason: HOSPADM

## 2023-08-30 RX ADMIN — HYDROCHLOROTHIAZIDE 12.5 MG: 12.5 TABLET ORAL at 12:18

## 2023-08-30 RX ADMIN — CEFAZOLIN 2000 MG: 2 INJECTION, POWDER, FOR SOLUTION INTRAMUSCULAR; INTRAVENOUS at 08:35

## 2023-08-30 RX ADMIN — HYDROCODONE BITARTRATE AND ACETAMINOPHEN 1 TABLET: 5; 325 TABLET ORAL at 04:05

## 2023-08-30 RX ADMIN — LEVOTHYROXINE SODIUM 100 MCG: 0.1 TABLET ORAL at 06:04

## 2023-08-30 RX ADMIN — Medication 10 ML: at 08:34

## 2023-08-30 RX ADMIN — HYDROCODONE BITARTRATE AND ACETAMINOPHEN 1 TABLET: 5; 325 TABLET ORAL at 08:46

## 2023-08-30 RX ADMIN — HYDRALAZINE HYDROCHLORIDE 10 MG: 20 INJECTION INTRAMUSCULAR; INTRAVENOUS at 04:05

## 2023-08-30 RX ADMIN — CEFAZOLIN 2000 MG: 2 INJECTION, POWDER, FOR SOLUTION INTRAMUSCULAR; INTRAVENOUS at 01:19

## 2023-08-30 RX ADMIN — Medication 1000 UNITS: at 08:34

## 2023-08-30 RX ADMIN — LOSARTAN POTASSIUM 50 MG: 50 TABLET, FILM COATED ORAL at 12:18

## 2023-08-30 NOTE — PLAN OF CARE
Assessment: Aparna Matson presents with functional mobility impairments which indicate the need for skilled intervention. Pt and family was educated on post-op restrictions of LUE following pacemaker implantation. Pt was able to ambulate 300' CGA with rw. Tolerating session today without incident. Recommend use of straight wheeled rw vs rollator due to increase stability and safety with household ambulation.Likely to daughter's home today with 24/7 family support, but will need updated goals if plan changes.

## 2023-08-30 NOTE — PROGRESS NOTES
Spoke to patient daughter they are agreeable to [facility] services   No other agencies in the home  Daughter is going to stay with her for a while to help with her needs    Dr. brewer to follow per secure chat  Nursing  PT OT

## 2023-08-30 NOTE — OUTREACH NOTE
Prep Survey      Flowsheet Row Responses   [facility] patient discharged from? [facility]   Is LACE score < 7 ? Yes   Eligibility [facility]   Date of Admission 08/26/23   Date of Discharge 08/30/23   Discharge Disposition Home-Health Care Svc   Discharge diagnosis Dizziness-  Status post placement of cardiac pacemaker   Does the patient have one of the following disease processes/diagnoses(primary or secondary)? General Surgery   Does the patient have Home health ordered? Yes   What is the Home health agency?  [facility]   Is there a DME ordered? Yes   What DME was ordered? [facility]   Prep survey completed? Yes            Liz CHEUNG - Registered Nurse

## 2023-08-30 NOTE — CASE MANAGEMENT/SOCIAL WORK
Case Management Discharge Note      Final Note: [facility]    Provided Post Acute Provider List?: Yes  Post Acute Provider List: Home Health  Delivered To: Patient, Support Person  Method of Delivery: In person     Durable Medical Equipment Coordination complete.      Service Provider Selected Services Address Phone Fax Patient Preferred    [facility] [address], [place] [address] [phone] --                Home Medical Care Coordination complete.      Service Provider Selected Services Address Phone Fax Patient Preferred    [facility] ,  Home Nursing [address] [facility] [address] [phone] --           Transportation Services  Private: Car    Final Discharge Disposition Code: 06 - home with home health care

## 2023-08-30 NOTE — CASE MANAGEMENT/SOCIAL WORK
Continued Stay Note  [facility]     Patient Name: Aparna Matson  MRN: [id number]  Today's Date: 8/30/2023    Admit Date: 8/26/2023    Plan: Home with daughter. [facility] accepted, order placed. [facility] for RW.   Discharge Plan       Row Name 08/30/23 1202       Plan    Plan Home with daughter. [facility] accepted, order placed. [facility] for RW.    Plan Comments Per daughter, patient is now discharging to her own home, daughter is staying with patient. Per PT, patient needs a rolling walker. CM asked MD to place order, CM placed referral to [facility], pending delivery.      Row Name 08/30/23 0907       Plan    Plan Home with daughter. [facility] accepted, order placed.    Plan Comments Patient will discharge home with daughter. MD placed HH order for [facility]. Possible discharge today, 8/30.             Met with patient in room.     Maintained distance greater than six feet and spent less than 15 minutes in the room.        Trina Lujan RN, BSN  3A/2A   [address] [address]  Phone: [phone]  Fax: [phone]

## 2023-08-30 NOTE — PLAN OF CARE
Goal Outcome Evaluation:         PT is post pacemaker insertion. She has an incision on her upper left chest wall that is covered with dressing. PT blood pressure has been high at times but she has also been in pain during those times. PT is drinking fluids fine so NS has been stopped. PT is resting with call light within reach.

## 2023-08-30 NOTE — ANESTHESIA POSTPROCEDURE EVALUATION
Patient: Aparna Matson    Procedure Summary       Date: 08/29/23 Room / Location: [facility] CATH LAB 3 /  [facility] CATH INVASIVE LOCATION    Anesthesia Start: 1716 Anesthesia Stop: 1834    Procedure: Pacemaker DC new, [employer] ab[employer]ott aware $ Diagnosis:       Sick sinus syndrome      (symptomatic bradycardia, sick sinus syndrome)    Providers: Eric Cadena MD Provider: Marvin Robbins MD    Anesthesia Type: general, MAC ASA Status: 3            Anesthesia Type: general, MAC    Vitals  Vitals Value Taken Time   /83 08/29/23 2016   Temp     Pulse 61 08/29/23 2027   Resp 18 08/29/23 2015   SpO2 97 % 08/29/23 2027   Vitals shown include unvalidated device data.        Post Anesthesia Care and Evaluation    Patient location during evaluation: PACU  Patient participation: complete - patient participated  Level of consciousness: awake  Pain scale: See nurse's notes for pain score.  Pain management: adequate    Airway patency: patent  Anesthetic complications: No anesthetic complications  PONV Status: none  Cardiovascular status: acceptable  Respiratory status: acceptable and spontaneous ventilation  Hydration status: acceptable    Comments: Patient seen and examined postoperatively; vital signs stable; SpO2 greater than or equal to 90%; cardiopulmonary status stable; nausea/vomiting adequately controlled; pain adequately controlled; no apparent anesthesia complications; patient discharged from anesthesia care when discharge criteria were met

## 2023-08-30 NOTE — THERAPY TREATMENT NOTE
"Subjective: Pt agreeable to therapeutic plan of care.    Objective:     Transfers - CGA and with rolling walker, provided education for limiting use on LUE on rw.   Ambulation - 300 feet CGA and with rolling walker, provided education on amount of weight allowed through LUE with rw.       Vitals: WNL    Pain: 4/10 FLACC scale  Location: LUE - pacemaker site  Intervention for pain: Repositioned, RN pre-medicated.     Education: Verbal/Tactile Cues, ADL training, Transfer Training, Gait Training, and Post-Op Precautions    Assessment: Aparna Matson presents with functional mobility impairments which indicate the need for skilled intervention. Pt and family was educated on post-op restrictions of LUE following pacemaker implantation. Pt was able to ambulate 300' CGA with rw. Tolerating session today without incident. Recommend use of straight wheeled rw vs rollator due to increase stability and safety with household ambulation.Likely to daughter's home today with /7 family support, but will need updated goals if plan changes.      Plan/Recommendations:   Low Intensity Therapy recommended post-acute care - This is recommended as therapy feels this patient would require 2-3 visits per week. OP or HH would be the best option depending on patient's home bound status. Consider, if the patient has other  \"skilled\" needs such as wounds, IV antibiotics, etc. Combined with \"low intensity\" could also equate to a SNF. If patient is medically complex, consider LTAC.. Pt requires rolling walker at discharge. Notified .    Pt desires Home with Home Health at discharge. Pt cooperative; agreeable to therapeutic recommendations and plan of care.         Basic Mobility 6-click:  Rollin = Total, A lot = 2, A little = 3; 4 = None  Supine>Sit:   1 = Total, A lot = 2, A little = 3; 4 = None   Sit>Stand with arms:  1 = Total, A lot = 2, A little = 3; 4 = None  Bed>Chair:   1 = Total, A lot = 2, A little = 3; 4 = " None  Ambulate in room:  1 = Total, A lot = 2, A little = 3; 4 = None  3-5 Steps with railin = Total, A lot = 2, A little = 3; 4 = None  Score: 21    Post-Tx Position: Up in Chair, Staff Present, and Call light and personal items within reach  PPE: gloves

## 2023-08-30 NOTE — PROGRESS NOTES
Cardiology [place]        LOS:  LOS: 1 day   Patient Name: Aparna Matson  Age/Sex: 88 y.o. female  : 1935  MRN: [id number]    Day of Service: 23   Length of Stay: 1  Encounter Provider: Surya Johnson MD  Place of Service: [facility] CARDIOLOGY  Patient Care Team:  Matt Arana MD as PCP - General (Family Medicine)  Matt Arana MD as PCP - Family Medicine  AlexSurya MD as Consulting Physician (Cardiology)    Subjective:     Chief Complaint: f/u dizziness, bradycardia    Subjective: Seen and examined family at bedside, pacemaker site inspected, interrogation okay  Dressing removed bandaged and replace  Instructed and educated on Bandage replacement    Current Medications:   Scheduled Meds:atorvastatin, 10 mg, Oral, Nightly  cholecalciferol, 1,000 Units, Oral, Daily  losartan, 50 mg, Oral, Q24H   And  hydroCHLOROthiazide, 12.5 mg, Oral, Q24H  levothyroxine, 100 mcg, Oral, Daily  senna-docusate sodium, 2 tablet, Oral, BID  sodium chloride, 10 mL, Intravenous, Q12H      Continuous Infusions:       Allergies:  Allergies   Allergen Reactions    Codeine Hallucinations       Review of Systems   Constitutional: Negative for chills, diaphoresis and malaise/fatigue.   Cardiovascular:  Negative for chest pain, dyspnea on exertion, irregular heartbeat, leg swelling, near-syncope, orthopnea, palpitations, paroxysmal nocturnal dyspnea and syncope.   Respiratory:  Negative for cough, shortness of breath, sleep disturbances due to breathing and sputum production.    Gastrointestinal:  Negative for change in bowel habit.   Genitourinary:  Negative for urgency.   Neurological:  Positive for dizziness. Negative for headaches.   Psychiatric/Behavioral:  Negative for altered mental status.        Objective:     Temp:  [97.5 °F (36.4 °C)-98.1 °F (36.7 °C)] 98.1 °F (36.7 °C)  Heart Rate:  [46-91] 60  Resp:  [13-22] 17  BP: (111-179)/()  140/79     Intake/Output Summary (Last 24 hours) at 8/30/2023 1308  Last data filed at 8/30/2023 0953  Gross per 24 hour   Intake 1719.33 ml   Output --   Net 1719.33 ml       Body mass index is 26.78 kg/m².      08/28/23  1538 08/29/23  0425 08/30/23  0339   Weight: 68 kg (150 lb) 70 kg (154 lb 5.2 oz) 73 kg (160 lb 15 oz)         General Appearance:    Alert, cooperative, in no acute distress                                Head: Atraumatic, normocephalic, PERRLA               Neck:   supple, trachea midline, no thyromegaly, no carotid bruit, no JVD   Lungs:     Clear to auscultation, respirations regular, even and               unlabored    Heart:    Regular rhythm and bradycardic rate, normal S1 and S2   Abdomen:     Normal bowel sounds, no masses, no organomegaly, soft  nontender, nondistended, no guarding, no rebound  tenderness   Extremities:   Moves all extremities well, no edema, no cyanosis, no  redness   Pulses:   Pulses palpable and equal bilaterally   Skin:   No bleeding, bruising or rash   Neurologic:   Awake, alert, oriented x3     Unchanged from prior encounter    Lab Review:   Results from last 7 days   Lab Units 08/29/23 2307 08/29/23 0317 08/27/23 2328 08/27/23 0028   SODIUM mmol/L 139 140   < > 138   POTASSIUM mmol/L 3.7 4.0   < > 3.4*   CHLORIDE mmol/L 106 107   < > 100   CO2 mmol/L 21.0* 28.0   < > 28.0   BUN mg/dL 18 19   < > 22   CREATININE mg/dL 0.90 1.06*   < > 1.16*   GLUCOSE mg/dL 83 89   < > 126*   CALCIUM mg/dL 9.2 9.7   < > 9.8   AST (SGOT) U/L  --   --   --  13   ALT (SGPT) U/L  --   --   --  6    < > = values in this interval not displayed.       Results from last 7 days   Lab Units 08/27/23  0226 08/27/23  0028   HSTROP T ng/L 17* 13*       Results from last 7 days   Lab Units 08/29/23 2307 08/29/23 0317   WBC 10*3/mm3 9.40 7.60   HEMOGLOBIN g/dL 12.9 12.3   HEMATOCRIT % 39.1 36.9   PLATELETS 10*3/mm3 114* 114*           Results from last 7 days   Lab Units 08/27/23  1784  08/27/23  0226   MAGNESIUM mg/dL 1.9 1.9             Invalid input(s): LDLCALC      Results from last 7 days   Lab Units 08/27/23  0028   TSH uIU/mL 0.949         Recent Radiology:  Imaging Results (Most Recent)       Procedure Component Value Units Date/Time    XR Chest 1 View [[id number]] Collected: 08/29/23 1935     Updated: 08/29/23 1939    Narrative:      XR CHEST 1 VW    Date of Exam: 8/29/2023 7:13 PM EDT    Indication: Post ICD / Pacer Implant    Comparison: 4/21/2021    Findings:  Lines: Left subclavian pacemaker leads are intact and appear to be in adequate position    Lungs: Nonspecific minimal perihilar pulmonary vascular prominence  Pleura: No definite pleural effusion. Opacity in the left costophrenic angle most likely represents epicardial fat.    Cardiomediastinum: The cardiomediastinal silhouette is normal    Soft Tissues: Unremarkable.    Bones: No acute osseous abnormality.      Impression:      Impression:  Status post left subclavian dual-lead pacemaker placement without radiographic evidence of postprocedural complication      Electronically Signed: Efrain Castrejon DO    8/29/2023 7:37 PM EDT    Workstation ID: [id number]    MRI Brain Without Contrast [[id number]] Collected: 08/27/23 0807     Updated: 08/27/23 0811    Narrative:      MRI BRAIN WO CONTRAST    Date of Exam: 8/27/2023 7:30 AM EDT    Indication: dizziness.     Comparison: CT head from earlier today and MRI brain from April 22, 2019    Technique:  Routine multiplanar/multisequence sequence images of the brain were obtained without contrast administration.      Findings:  No acute infarction, intracranial hemorrhage, or extra-axial collection is identified. The ventricles appear normal in caliber, with no evidence of mass effect or midline shift. The basal cisterns appear patent.    The midline structures appear intact. The globes and orbits appear intact. The intracranial vascular flow-voids appear patent. There is mild generalized  parenchymal atrophy. Scattered foci of periventricular and subcortical white matter FLAIR   hyperintensities are nonspecific, but likely the sequela of mild chronic small vessel ischemic disease.      Impression:      Impression:  1.No acute intracranial process identified.  2.Findings suggestive of mild chronic small vessel ischemic disease.        Electronically Signed: Devaughn Barboza MD    8/27/2023 8:09 AM EDT    Workstation ID: [id number]    CT Head Without Contrast [[id number]] Collected: 08/27/23 0330     Updated: 08/27/23 0333    Narrative:      CT HEAD WO CONTRAST    Date of Exam: 8/27/2023 3:15 AM EDT    Indication: dizzy.    Comparison: Brain MRI 4/22/2019. CT head 4/21/2019.    Technique: Axial CT images were obtained of the head without contrast administration.  Coronal reconstructions were performed.  Automated exposure control and iterative reconstruction methods were used.      Findings:  Superficial soft tissues appear within normal limits. The calvarium is intact.  Paranasal sinuses and mastoid air cells appear well aerated. There is thinning of the orbital lenses bilaterally. There is no acute intracranial hemorrhage.  No mass effect   or midline shift.  No abnormal extra-axial collections.  Gray-white differentiation is within normal limits. There is mild patchy periventricular white matter hypoattenuation and there is mild generalized parenchymal volume loss congruent with age. There   are mild intracranial vascular calcifications. Ventricular size and configuration is normal for age.      Impression:      Impression:    1. No acute intracranial abnormality.  2. Mild chronic small vessel ischemic change.      Electronically Signed: Babatunde Jewell MD    8/27/2023 3:31 AM EDT    Workstation ID: [id number]            ECHOCARDIOGRAM:    Results for orders placed during the hospital encounter of 08/26/23    Adult Transthoracic Echo Complete W/ Cont if Necessary Per Protocol    Interpretation Summary     Left ventricular systolic function is normal. Left ventricular ejection fraction appears to be 61 - 65%.    Left ventricular diastolic function was normal.    The right atrial cavity is borderline dilated.    Estimated right ventricular systolic pressure from tricuspid regurgitation is normal (<35 mmHg).    Mild dilation of the aortic root is present.        I reviewed the patient's new clinical results.    EKG:      Assessment:       Dizziness    Sick sinus syndrome    dizziness   -secondary to bradycardia      2. Bradycardia, sick sinus syndrome     3. paroxysmal afib in setting of COVID (2021)     4. hypokalemia, mild      5. hypertension      6. dyslipidemia      7. hypothyroidism      8. Chronic kidney disease      9. iliac artery aneurysm      10. history of breast CA,   - previous left breast lumpectomy, right mastectomy, partial thyroidectomy, hysterectomy      11. history of CAD in father      12. non smoker      Plan:   Symptomatic bradycardia status post dual-chamber pacemaker  Bandage replaced  Interrogation okay  Okay to discharge today to home with outpatient follow-up in 30 days, follow-up with EP for surgical incision inspection  We will resume all home medicines      All questions answered proposed by patient    Surya Johnson MD, PhD      Greater than 50% of encounter time exam and medical decision making performed by me directly    Surya Johnson MD, PhD  Surya Johnson MD  08/30/23  13:08 EDT

## 2023-08-30 NOTE — PROGRESS NOTES
[facility]   PROGRESS NOTE      Referring Provider: No att. providers found    Reason for follow-up: Bradycardia, status post pacemaker implantation     Patient Care Team:  Matt Arana MD as PCP - General (Family Medicine)  Matt Arana MD as PCP - Family Medicine  Surya Johnson MD as Consulting Physician (Cardiology)      SUBJECTIVE    Patient doing well today.  Underwent dual-chamber pacemaker implantation yesterday.  Dressing removed, postoperative instructions given to patient and family.     Review of Systems   Constitutional: Negative for fever and malaise/fatigue.   Cardiovascular:  Negative for chest pain, irregular heartbeat and palpitations.   Respiratory:  Negative for shortness of breath.    All other systems reviewed and are negative.    Allergies:  Codeine    Personal History:    Past Medical History:   Diagnosis Date    Abnormal mammogram of right breast     Breast cyst, right     Breast lump     BREAST LUMPS    Chickenpox     Dizziness     Ductal carcinoma in situ of right breast     Elevated serum creatinine     Hearing loss     Hemorrhoids     Hyperlipidemia     Hypertension     Hypothyroidism     Iliac artery aneurysm, left     Knee pain, left     Leg cramps     Leg pain, left     Mass of breast, right     Measles     Memory impairment     Mumps     Other specified local infections of the skin and subcutaneous tissue     SORE AT LEFT ANKLE     Otitis media, acute     RIGHT     Postmenopausal status     Sinus disorder     SINUS    Skin lesion of face     LEFT TEMPLE     Skin lesion of wrist     LEFT FOREARM    Varicose veins of both lower extremities     Whooping cough        Past Surgical History:   Procedure Laterality Date    BREAST LUMPECTOMY Left 1994    CARDIAC ELECTROPHYSIOLOGY PROCEDURE N/A 8/29/2023    Procedure: Pacemaker DC new, [employer] abbott aware $;  Surgeon: Eric Cadena MD;  [facility];   "Service: Cardiovascular;  Laterality: N/A;    HYSTERECTOMY      MASTECTOMY Right 09/25/2017    DR. FUNG    THYROIDECTOMY, PARTIAL  1961       Family History   Problem Relation Age of Onset    Heart attack Father 67        AMI    Aneurysm Brother         RUPTURED     Heart disease Other     Hypertension Other     Anuerysm Other         AAA- ABDOMINAL AORTIC ANUERYSM     Diabetes Other        Social History     Tobacco Use    Smoking status: Never    Smokeless tobacco: Never   Vaping Use    Vaping Use: Never used   Substance Use Topics    Alcohol use: Not Currently    Drug use: Never        No medications prior to admission.         OBJECTIVE    VITAL SIGNS  Vitals:    08/29/23 2132 08/30/23 0045 08/30/23 0339 08/30/23 0733   BP: 166/95 161/96 (!) 174/104 140/79   BP Location: Left leg Left leg Left leg Left leg   Patient Position: Lying Lying Lying Lying   Pulse: 60 60 60 60   Resp: 20 13 16 17   Temp:  98 °F (36.7 °C) 98 °F (36.7 °C) 98.1 °F (36.7 °C)   TempSrc:  Oral Oral Oral   SpO2: 99% 96% 98% 98%   Weight:   73 kg (160 lb 15 oz)    Height:         Flowsheet Rows      Flowsheet Row First Filed Value   Admission Height 165.1 cm (65\") Documented at 08/26/2023 2356   Admission Weight 68 kg (150 lb) Documented at 08/26/2023 2356             TELEMETRY: Sinus rhythm    Physical Exam:  Vitals reviewed.   Constitutional:       General: Awake.      Appearance: Well-developed and not in distress.   Eyes:      Pupils: Pupils are equal, round, and reactive to light.   Pulmonary:      Effort: Pulmonary effort is normal.      Breath sounds: Normal breath sounds.   Cardiovascular:      Normal rate. Regular rhythm. Normal S1. Normal S2.       Comments: Left chest incision site clean and dry with Steri-Strips intact.  No bleeding or hematoma at site.  Minor bruising noted.  Pulses:     Intact distal pulses.   Edema:     Peripheral edema absent.   Abdominal:      General: Bowel sounds are normal.      Palpations: Abdomen is " soft.   Musculoskeletal: Normal range of motion.      Cervical back: Normal range of motion. Skin:     General: Skin is warm and dry.   Neurological:      Mental Status: Alert and oriented to person, place and time.   Psychiatric:         Behavior: Behavior is cooperative.        LAB RESULTS (LAST 7 DAYS)  I have reviewed new clinical results.    CMP   Results from last 7 days   Lab Units 08/29/23 2307 08/29/23 0317 08/27/23 2328 08/27/23  0028   SODIUM mmol/L 139 140 141 138   POTASSIUM mmol/L 3.7 4.0 3.8 3.4*   CHLORIDE mmol/L 106 107 108* 100   CO2 mmol/L 21.0* 28.0 25.0 28.0   BUN mg/dL 18 19 21 22   CREATININE mg/dL 0.90 1.06* 0.98 1.16*   GLUCOSE mg/dL 83 89 89 126*   ALBUMIN g/dL  --   --   --  3.6   BILIRUBIN mg/dL  --   --   --  0.5   ALK PHOS U/L  --   --   --  48   AST (SGOT) U/L  --   --   --  13   ALT (SGPT) U/L  --   --   --  6     CBC  Results from last 7 days   Lab Units 08/29/23 2307 08/29/23 0317 08/27/23 2328 08/27/23  0028   WBC 10*3/mm3 9.40 7.60 6.50 10.00   RBC 10*6/mm3 4.25 4.12 3.79 4.47   HEMOGLOBIN g/dL 12.9 12.3 11.6* 13.3   HEMATOCRIT % 39.1 36.9 34.9 40.2   MCV fL 91.9 89.5 92.0 89.9   PLATELETS 10*3/mm3 114* 114* 116* 135*     ProBNP      TROPONIN  Results from last 7 days   Lab Units 08/27/23  0226   HSTROP T ng/L 17*     CoAg      Creatinine Clearance  Estimated Creatinine Clearance: 44.9 mL/min (by C-G formula based on SCr of 0.9 mg/dL).    Radiology  XR Chest 1 View    Result Date: 8/29/2023  Impression: Status post left subclavian dual-lead pacemaker placement without radiographic evidence of postprocedural complication Electronically Signed: Efrain Castrejon DO  8/29/2023 7:37 PM EDT  Workstation ID: [id number]     EKG    I personally viewed and interpreted the patient's EKG/Telemetry data:  ECG 12 Lead Bradycardia   Final Result   HEART RATE= 48  bpm   RR Interval= 1272  ms   DC Interval= 208  ms   P Horizontal Axis= -2  deg   P Front Axis= 52  deg   QRSD Interval= 105  ms    QT Interval= 496  ms   QTcB= 440  ms   QRS Axis= 17  deg   T Wave Axis= 55  deg   - ABNORMAL ECG -   Sinus bradycardia   Atrial premature complexes   Electronically Signed By: Owen Watts (Dex) 27-Aug-2023 13:33:22   Date and Time of Study: 2023-08-27 00:17:05      SCANNED - TELEMETRY     Final Result      SCANNED - TELEMETRY     Final Result      SCANNED - TELEMETRY     Final Result      SCANNED - TELEMETRY     Final Result      SCANNED - TELEMETRY     Final Result      SCANNED - TELEMETRY     Final Result      SCANNED - TELEMETRY     Final Result      SCANNED - TELEMETRY     Final Result      SCANNED - TELEMETRY     Final Result          ECHOCARDIOGRAM:  Results for orders placed during the hospital encounter of 08/26/23    Adult Transthoracic Echo Complete W/ Cont if Necessary Per Protocol    Interpretation Summary    Left ventricular systolic function is normal. Left ventricular ejection fraction appears to be 61 - 65%.    Left ventricular diastolic function was normal.    The right atrial cavity is borderline dilated.    Estimated right ventricular systolic pressure from tricuspid regurgitation is normal (<35 mmHg).    Mild dilation of the aortic root is present.      STRESS MYOVIEW:      CARDIAC CATHETERIZATION:  No results found for this or any previous visit.      OTHER:       ASSESSMENT/PLAN      Dizziness    Sick sinus syndrome    Status post placement of cardiac pacemaker      PLAN  Aparna Matson is an 88-year-old female patient with no prior history of coronary artery disease, she presented to the hospital due to extreme dizziness and weakness.  She was found to be bradycardic heart rate consistently staying in the 40s.  Sinus rhythm, narrow QRS.  She had been on Cardizem which I am assuming she was taking since her episode of A-fib back in April 2021.  It was stopped on admission and 48 hours later she still bradycardic.  Her TSH was within range.  Her presentation along with the constant  bradycardia, constitute sick sinus syndrome and I think that the patient would benefit from implantation of a dual-chamber pacemaker.    Patient had dual-chamber pacemaker implanted on 8/29/2023.  Device interrogated this morning and functioning appropriately.  Dressing removed.  No bleeding or hematoma noted at site.  Minor bruising noted in axilla area.  Patient to follow-up in our office for wound/device check  9/13/2023.  She can then follow-up with her primary cardiologist Dr. Johnson.  Patient is okay to be discharged from EP standpoint.  We will start her on Keflex 500 mg twice daily x5 days for infection prophylaxis.    I discussed the patients findings and my recommendations with patient and nurse.      LEISA Pérez  08/30/23  15:20 EDT  Electronically signed by LEISA Pérez, 08/30/23, 3:26 PM EDT.

## 2023-08-30 NOTE — CASE MANAGEMENT/SOCIAL WORK
Social Work Assessment   [facility]     Patient Name: Aparna Matson  MRN: [id number]  Today's Date: 8/30/2023    Admit Date: 8/26/2023     Demographic Summary       Row Name 08/30/23 1525       General Information    Reason for Consult community resources    General Information Comments SW was notified of Meals on Wheels need. SW completed referral to [facility] via [facility].           RAUDEL Arellano, LSW  Medical Social Worker  Ph [phone]  Fax [phone]  [email or web address]

## 2023-08-30 NOTE — DISCHARGE SUMMARY
[facility] Medicine Services  Discharge Summary    Date of Service: 23   Patient Name: Aparna Matson  : 1935  MRN: [id number]    Date of Admission: 2023  Discharge Diagnosis: symptomatic Bradycardia s/p PPM  Date of Discharge:  23    Primary Care Physician: Matt Arana MD      Presenting Problem:   Dizziness [R42]    Active and Resolved Hospital Problems:  Active Hospital Problems    Diagnosis POA    **Dizziness [R42] Yes    Sick sinus syndrome [I49.5] Unknown      Resolved Hospital Problems   No resolved problems to display.         Hospital Course     Hospital Course:  Aparna Matson is a 88 y.o. female past medical history of hypertension, hyperlipidemia, hypothyroidism, paroxysmal A-fib and chronic kidney impairment who presented to [facility] on 2023 complaining of dizziness and presyncope. In the ED orthostatic vitals were taken lying down heart rate 55 blood pressure 146/73 sitting heart rate 56 blood pressure 151/68 standing heart rate 57 with blood pressure 140/72.  EKG was positive for sinus bradycardia with heart rate 48.    Patient was evaluated by EP for symptomatic bradycardia in the hospital, she had Pacemaker placed on 23 with no postoperative complicaitons. Patient stable for discharge back home. She has been advised to follow up with EP/cardiology outpatient.     DISCHARGE Follow Up Recommendations for labs and diagnostics: EP/Cardiology follow up      Reasons For Change In Medications and Indications for New Medications:  Cardizem discontinued due to bradycardia.    Day of Discharge     Vital Signs:  Temp:  [97.1 °F (36.2 °C)-98.1 °F (36.7 °C)] 98.1 °F (36.7 °C)  Heart Rate:  [44-91] 60  Resp:  [13-22] 17  BP: (111-179)/() 140/79    Physical Exam:  Constitutional:       Appearance: Normal appearance.   HENT:      Head: Normocephalic and atraumatic.      Nose: Nose normal.      Mouth/Throat:      Mouth: Mucous  membranes are moist.   Eyes:      Extraocular Movements: Extraocular movements intact.      Pupils: Pupils are equal, round, and reactive to light.   Cardiovascular:      Rate and Rhythm: Regular rhythm. Normal rate     Pulses: Normal pulses.      Heart sounds: Normal heart sounds.      Left chest wall pacemaker site - clean with no signs of infection or hematoma  Pulmonary:      Effort: Pulmonary effort is normal.      Breath sounds: Normal breath sounds.   Abdominal:      General: Bowel sounds are normal.      Palpations: Abdomen is soft.   Musculoskeletal:         General: Normal range of motion.      Cervical back: Normal range of motion.   Skin:     General: Skin is warm.      Capillary Refill: Capillary refill takes less than 2 seconds.   Neurological:      General: No focal deficit present.      Mental Status: She is alert and oriented to person, place, and time.   Psychiatric:         Mood and Affect: Mood normal.       Pertinent  and/or Most Recent Results     LAB RESULTS:      Lab 08/29/23 2307 08/29/23 0317 08/27/23 2328 08/27/23  0028   WBC 9.40 7.60 6.50 10.00   HEMOGLOBIN 12.9 12.3 11.6* 13.3   HEMATOCRIT 39.1 36.9 34.9 40.2   PLATELETS 114* 114* 116* 135*   NEUTROS ABS 6.80 4.30 3.30 7.10*   LYMPHS ABS 1.70 2.30 2.40 1.90   MONOS ABS 0.70 0.70 0.60 0.70   EOS ABS 0.10 0.30 0.20 0.20   MCV 91.9 89.5 92.0 89.9         Lab 08/29/23 2307 08/29/23 0317 08/27/23 2328 08/27/23  0226 08/27/23  0028   SODIUM 139 140 141  --  138   POTASSIUM 3.7 4.0 3.8  --  3.4*   CHLORIDE 106 107 108*  --  100   CO2 21.0* 28.0 25.0  --  28.0   ANION GAP 12.0 5.0 8.0  --  10.0   BUN 18 19 21  --  22   CREATININE 0.90 1.06* 0.98  --  1.16*   EGFR 62.4 51.3* 56.3*  --  46.0*   GLUCOSE 83 89 89  --  126*   CALCIUM 9.2 9.7 8.9  --  9.8   MAGNESIUM  --   --  1.9 1.9 2.0   TSH  --   --   --   --  0.949         Lab 08/27/23  0028   TOTAL PROTEIN 6.2   ALBUMIN 3.6   GLOBULIN 2.6   ALT (SGPT) 6   AST (SGOT) 13   BILIRUBIN 0.5    ALK PHOS 48         Lab 08/27/23  0226 08/27/23  0028   HSTROP T 17* 13*                 Brief Urine Lab Results  (Last result in the past 365 days)        Color   Clarity   Blood   Leuk Est   Nitrite   Protein   CREAT   Urine HCG        08/27/23 0043 Yellow   Clear   Negative   Trace   Negative   Negative                 Microbiology Results (last 10 days)       Procedure Component Value - Date/Time    COVID-19 and FLU A/B PCR - Swab, Nasopharynx [[id number]]  (Normal) Collected: 08/27/23 0029    Lab Status: Final result Specimen: Swab from Nasopharynx Updated: 08/27/23 0053     COVID19 Not Detected     Influenza A PCR Not Detected     Influenza B PCR Not Detected    Narrative:      Fact sheet for providers: https://www.fda.gov/media/572039/download    Fact sheet for patients: https://www.fda.gov/media/640008/download    Test performed by PCR.            CT Head Without Contrast    Result Date: 8/27/2023  Impression: Impression: 1. No acute intracranial abnormality. 2. Mild chronic small vessel ischemic change. Electronically Signed: Babatunde Jewell MD  8/27/2023 3:31 AM EDT  Workstation ID: [id number]    MRI Brain Without Contrast    Result Date: 8/27/2023  Impression: Impression: 1.No acute intracranial process identified. 2.Findings suggestive of mild chronic small vessel ischemic disease. Electronically Signed: Devaughn Barboza MD  8/27/2023 8:09 AM EDT  Workstation ID: [id number]    XR Chest 1 View    Result Date: 8/29/2023  Impression: Impression: Status post left subclavian dual-lead pacemaker placement without radiographic evidence of postprocedural complication Electronically Signed: Efrain Castrejon DO  8/29/2023 7:37 PM EDT  Workstation ID: [id number]             Results for orders placed during the hospital encounter of 08/26/23    Adult Transthoracic Echo Complete W/ Cont if Necessary Per Protocol    Interpretation Summary    Left ventricular systolic function is normal. Left ventricular ejection fraction  appears to be 61 - 65%.    Left ventricular diastolic function was normal.    The right atrial cavity is borderline dilated.    Estimated right ventricular systolic pressure from tricuspid regurgitation is normal (<35 mmHg).    Mild dilation of the aortic root is present.      Labs Pending at Discharge:      Procedures Performed  Procedure(s):  Pacemaker DC new, [employer] aware $         Consults:   Consults       Date and Time Order Name Status Description    8/28/2023 10:33 AM Inpatient Cardiac Electrophysiology Consult      8/27/2023  5:29 AM Inpatient Cardiology Consult Completed     8/27/2023  3:42 AM Inpatient Hospitalist Consult                Discharge Details        Discharge Medications        ASK your doctor about these medications        Instructions Start Date   aspirin 81 MG EC tablet   81 mg, Oral, Daily      cholecalciferol 25 MCG (1000 UT) tablet  Commonly known as: VITAMIN D3   1,000 Units, Oral, Daily      dilTIAZem  MG 24 hr capsule  Commonly known as: CARDIZEM CD   TAKE 1 CAPSULE EVERY DAY      losartan-hydrochlorothiazide 50-12.5 MG per tablet  Commonly known as: Hyzaar   1 tablet, Oral, Daily      pravastatin 40 MG tablet  Commonly known as: PRAVACHOL   TAKE 1 TABLET EVERY DAY      Synthroid 100 MCG tablet  Generic drug: levothyroxine   TAKE 1 TABLET EVERY DAY               Allergies   Allergen Reactions    Codeine Hallucinations         Discharge Disposition: Home      Diet:  Hospital:  Diet Order   Procedures    Diet: Regular/House Diet; Texture: Regular Texture (IDDSI 7); Fluid Consistency: Thin (IDDSI 0)         Discharge Activity: Increase as tolerated        CODE STATUS:  Code Status and Medical Interventions:   Ordered at: 08/27/23 0535     Medical Intervention Limits:    NO intubation (DNI)     Code Status (Patient has no pulse and is not breathing):    No CPR (Do Not Attempt to Resuscitate)     Medical Interventions (Patient has pulse or is breathing):    Limited Support          Future Appointments   Date Time Provider Department Center   1/18/2024  1:15 PM Matt Arana MD [facility]       Additional Instructions for the Follow-ups that You Need to Schedule       Ambulatory Referral to Home Health (Hospital)   As directed      Face to Face Visit Date: 8/30/2023   Follow-up provider for Plan of Care?: I treated the patient in an acute care facility and will not continue treatment after discharge.   Follow-up provider: MATT ARANA [[id number]]   Reason/Clinical Findings: Dizziness, fall   Describe mobility limitations that make leaving home difficult: Dizziness, fall, imbalance   Nursing/Therapeutic Services Requested: Skilled Nursing Physical Therapy Occupational Therapy   Skilled nursing orders: Medication education Cardiopulmonary assessments Wound care dressing/changes   PT orders: Gait Training Strengthening   Weight Bearing Status: As Tolerated   Occupational orders: Strengthening   Frequency: 1 Week 1                Time spent on Discharge including face to face service:  25 minutes    Discussed the plan of care with the patient and the family at bedside. 08/30/23      Signature: Electronically signed by Leonel Gray MD, 08/30/23, 09:15 EDT.  [facility]ist Team

## 2023-08-31 ENCOUNTER — HOME CARE VISIT (OUTPATIENT)
Dept: HOME HEALTH SERVICES | Facility: HOME HEALTHCARE | Age: 88
End: 2023-08-31
Payer: MEDICARE

## 2023-08-31 ENCOUNTER — TRANSITIONAL CARE MANAGEMENT TELEPHONE ENCOUNTER (OUTPATIENT)
Dept: CALL CENTER | Facility: HOSPITAL | Age: 88
End: 2023-08-31
Payer: MEDICARE

## 2023-08-31 VITALS
HEART RATE: 61 BPM | SYSTOLIC BLOOD PRESSURE: 122 MMHG | RESPIRATION RATE: 16 BRPM | TEMPERATURE: 97.3 F | DIASTOLIC BLOOD PRESSURE: 70 MMHG | OXYGEN SATURATION: 97 %

## 2023-08-31 PROCEDURE — G0299 HHS/HOSPICE OF RN EA 15 MIN: HCPCS

## 2023-08-31 NOTE — OUTREACH NOTE
Call Center TCM Note      Flowsheet Row Responses   [facility] patient discharged from? [facility]   Does the patient have one of the following disease processes/diagnoses(primary or secondary)? General Surgery   TCM attempt successful? Yes   Call start time 1147   Call end time 1211   Discharge diagnosis Dizziness-  Status post placement of cardiac pacemaker   Person spoke with today (if not patient) and relationship Michelle Montero   Meds reviewed with patient/caregiver? Yes   Is the patient having any side effects they believe may be caused by any medication additions or changes? No   Does the patient have all medications related to this admission filled (includes all antibiotics, pain medications, etc.) Yes   Prescription comments abx rx was added, daughter states they had it filled last night.   Is the patient taking all medications as directed (includes completed medication regime)? Yes   Comments PCP appt made for 9/12/23 at 1030am with Dr. Arana.   Does the patient have an appointment with their PCP within 7-14 days of discharge? No   Nursing Interventions Assisted patient with making appointment per protocol   What is the Home health agency?  [facility]   Has home health visited the patient within 72 hours of discharge? Yes   Home health comments HH 2 x's a week. seen today, rebandaged incision. PT and OT to come out on 9/1/23 and 9/5/23 for evaluation.   What DME was ordered? Rolling [facility]   Has all DME been delivered? Yes   Psychosocial issues? No   Comments daughter states pt is doing good. incision looks good. still in sling. having slight swelling in hand from being in sling. advised proper positioning of sling. pt is eating and drinking well. mild soreness, taking 1/2 tab of Norco in the morning and night for pain. effective for pain relief.  checking vital signs. daughter reports all vital signs wnl.   Did the patient receive a copy of their discharge  instructions? Yes   Nursing interventions Reviewed instructions with patient  [with daughter, Kylah]   What is the patient's perception of their health status since discharge? Improving   Nursing interventions Nurse provided patient education   Is the patient /caregiver able to teach back basic post-op care? Lifting as instructed by MD in discharge instructions, Keep incision areas clean,dry and protected, No tub bath, swimming, or hot tub until instructed by MD, Take showers only when approved by MD-sponge bathe until then   Is the patient/caregiver able to teach back signs and symptoms of incisional infection? Increased redness, swelling or pain at the incisonal site, Increased drainage or bleeding, Pus or odor from incision, Incisional warmth, Fever   Is the patient/caregiver able to teach back steps to recovery at home? Set small, achievable goals for return to baseline health, Rest and rebuild strength, gradually increase activity   If the patient is a current smoker, are they able to teach back resources for cessation? Not a smoker   Is the patient/caregiver able to teach back the hierarchy of who to call/visit for symptoms/problems? PCP, Specialist, Home health nurse, Urgent Care, ED, 911 Yes   TCM call completed? Yes   Wrap up additional comments no issues or concerns per daughter.   Call end time 1211   Would this patient benefit from a Referral to [facility] Social Work? No   Is the patient interested in additional calls from an ambulatory ? No            Cheryl Melton RN    8/31/2023, 12:11 EDT

## 2023-09-01 ENCOUNTER — HOME CARE VISIT (OUTPATIENT)
Dept: HOME HEALTH SERVICES | Facility: HOME HEALTHCARE | Age: 88
End: 2023-09-01
Payer: MEDICARE

## 2023-09-01 ENCOUNTER — TELEPHONE (OUTPATIENT)
Dept: CARDIOLOGY | Facility: CLINIC | Age: 88
End: 2023-09-01
Payer: MEDICARE

## 2023-09-01 VITALS
OXYGEN SATURATION: 95 % | TEMPERATURE: 98 F | SYSTOLIC BLOOD PRESSURE: 140 MMHG | HEART RATE: 61 BPM | DIASTOLIC BLOOD PRESSURE: 85 MMHG

## 2023-09-01 PROCEDURE — G0151 HHCP-SERV OF PT,EA 15 MIN: HCPCS

## 2023-09-01 NOTE — HOME HEALTH
"SOC Note: Patient is a 88 year old female who went to the ER due to dizziness and unable to stand up off the couch. Daughter states that MD was initially thinking it was some of her medication, but even after discontinuation of meds, patient's HR was still dropping in the 30's. Patient had pacemaker placed. Incision to CADE covered with steri strips and dry dressing. Patient lives alone, but daughter is staying with her at this time. Patient declines OT, but agrees to PT eval.    SN- 2W2, 1W4    Home Health ordered for: SN and PT    Reason for Hosp/Primary Dx/Co-morbidities: sick sinus syndrome    Focus of Care: aftercare following pacemaker placement    Patient's goal(s): \"to heal incision and be able to stay home by myself\"    Current Functional status/mobility/DME: using walker or cane in the home    HB status/Living Arrangements: daughter staying with patient    Skin Integrity/wound status: incision to CADE    Code Status: DNR    Fall Risk/Safety concerns: high    Medication issues/Concerns: none    Additional Problems/Concerns: none    SDOH Barriers (i.e. caregiver concerns, social isolation, transportation, food insecurity, environment, income etc.)/Need for MSW: no    Plan for next visit: CP assess, falls/safety assess, med teaching/assess for changes, incision care/assess, pain management education"

## 2023-09-01 NOTE — TELEPHONE ENCOUNTER
Patient's daughter, teri, is gonna need fmla paperwork filled out for intermittent leave from her job, due to caring for her mother. The patient was seen by dr hartley in the hospital and dr orona was the one who placed the pacemaker. Pt will be seeing alisson styles in office soon but is otherwise new to us. The daughter is wondering how long it will take for the fmla paperwork to get done if she brings it to us on Tuesday. Please call her back at the the numbers that I listed.

## 2023-09-01 NOTE — HOME HEALTH
PT EVAL Wed and Fr 1w1 2w3  Patient is a 88 year old  female,referred for skilled PT interventions after recent hospital stay due to pacemaker placement. Patient requires Min A for bed mobility, Min A for sit to stand transfers, Min A for gait with single tip cane.  SOCIAL SUPPORT/HOME LIVING SITUATION. Lives alone but daughter and other family members have been staying to help patient  PLOF. Px reports being independent without an AD prior to hospitalization  Vital Signs. Please see [facility]/eval on this visit  Homebound status. Due to dec act tolerance, weakness, decline in transfers and ambulation. Patient requires a taxing effort to leave home, putting patient at risk for falls or injury  Skilled PT needed to improve patient's functional mobility, improve safety for transfers and ambulation and return patient to PLOF.    Plan on next visit: HEP teaching, gait training

## 2023-09-05 ENCOUNTER — HOME CARE VISIT (OUTPATIENT)
Dept: HOME HEALTH SERVICES | Facility: HOME HEALTHCARE | Age: 88
End: 2023-09-05
Payer: MEDICARE

## 2023-09-05 VITALS
TEMPERATURE: 98.9 F | HEART RATE: 60 BPM | DIASTOLIC BLOOD PRESSURE: 64 MMHG | OXYGEN SATURATION: 95 % | RESPIRATION RATE: 17 BRPM | SYSTOLIC BLOOD PRESSURE: 122 MMHG

## 2023-09-05 PROCEDURE — G0299 HHS/HOSPICE OF RN EA 15 MIN: HCPCS

## 2023-09-05 NOTE — HOME HEALTH
Routine Visit Note: Pt reports feeling well on this date with no new complaints or issues. Pt denies dizziness and minimal pain to incision site. Pt has had a little bit of swelling to her left hand but it goes away with elevation, no reddness noted at this time. No medication changes or issues reported. Pt encouraged to continue to not raise her arm or use it for any heavy lifting. Pt verbalizes understanding. Reviewed with pt and daughter how pacemaker works and funcitons and answered all questions at this time.     Skill/education provided: pacemaker review and education, incision montioring    Patient/caregiver response: pt and family verbalize understanding    Plan for next visit:   CP assess  incisional assess  pacemaker monitoring and education  assess pain and falls  medication managment

## 2023-09-06 ENCOUNTER — HOME CARE VISIT (OUTPATIENT)
Dept: HOME HEALTH SERVICES | Facility: HOME HEALTHCARE | Age: 88
End: 2023-09-06
Payer: MEDICARE

## 2023-09-06 VITALS
DIASTOLIC BLOOD PRESSURE: 66 MMHG | SYSTOLIC BLOOD PRESSURE: 130 MMHG | HEART RATE: 81 BPM | TEMPERATURE: 98.5 F | OXYGEN SATURATION: 96 %

## 2023-09-06 PROCEDURE — G0157 HHC PT ASSISTANT EA 15: HCPCS

## 2023-09-07 ENCOUNTER — HOME CARE VISIT (OUTPATIENT)
Dept: HOME HEALTH SERVICES | Facility: HOME HEALTHCARE | Age: 88
End: 2023-09-07
Payer: MEDICARE

## 2023-09-07 VITALS — HEART RATE: 60 BPM | OXYGEN SATURATION: 97 % | TEMPERATURE: 97.5 F

## 2023-09-07 PROCEDURE — G0152 HHCP-SERV OF OT,EA 15 MIN: HCPCS

## 2023-09-07 NOTE — HOME HEALTH
INITIAL OT EVALUATION      Pt is 87 y/o female   admitted to home health on 8/31/23 by SN. Pt referred to OT due to recnt hospital stay due to SSS and needing pacemaker-8/29/23     Pt denies any falls or changes of medications since last HH visit.        Pt unable list any precautions due to pacemaker placement  1. no reaching overhead  2. no lifting more than 10 #   3. no soaking     PMH: Reviewed. Pertinent to rehab:  pt's son reports memory not good, no offical dementia dx     Social/Environment: Pt has son that lives close to check in and presently dgter is living with her.     PLOF:   Pt was indep with all ADL's and simple meal prep. Fx mob without AD. Pt's dgter organizes pills in organizer and family will stop by to reminder her to take meds daily.     Current Status:  Medication Management:  Pt has dgter in her home now who gives her meds. Dgter has new pill organizer now with alarm and only opens when its due.    Mod Barthel Index: 83/100 mod dependency.  Risks for falls: no recent, no falls.  Pt uses fx mob with SBA . Pt needed min A for CTS from shower built in bench.  Toilet transfer mod diff used door knob for CTS     Cognition: Pt oriented person, place, not month-Aug, not year 2003, not situation, can't remember why she was in hospital but remembers she got a pacemaker.     DME: Pt has  [employer].     Pt’s Goals:      OT Eval Recommendations and Treatment:     1. BR for bed   2. shower chair  3. Crystal toilet seat with arms     Skilled OT recommendedin Oct  [id number] for ADLs, fx transfer, home safety.    Pt in agreement with POC.      Rehab Potential      Plan for next visit goes for pacemaker check sept 13th. OT to see next month after PT d/c and pacemaker placement has healed and f/u wot surgeon.

## 2023-09-07 NOTE — HOME HEALTH
pt sitting up and is prepared for PT session,  feeling fair and is motivated to participate and improve.  pt has been attempting hep  review as able.   pt reports she has had no falls but uses her walker at times due to balance deficits.  pt prefers not use the walker       pt was limited today in all areas, and needed cues to remain on task with proper contract/hold- and to avoid compenstory movement.  pt was cued on proper precautions to avoid LUE overhead lifting and to avoid lifting heavy objects.    pt was minimally unsteady upon standing and display slight balance deficits during gait trg,  encouraged cane use for safety  auejrryer was present and educated on the activities performed         plan for next session,   gait trg, hep review, balnce trg all with advancment as able

## 2023-09-08 ENCOUNTER — HOME CARE VISIT (OUTPATIENT)
Dept: HOME HEALTH SERVICES | Facility: HOME HEALTHCARE | Age: 88
End: 2023-09-08
Payer: MEDICARE

## 2023-09-08 VITALS
SYSTOLIC BLOOD PRESSURE: 124 MMHG | TEMPERATURE: 97 F | RESPIRATION RATE: 18 BRPM | OXYGEN SATURATION: 98 % | DIASTOLIC BLOOD PRESSURE: 67 MMHG | HEART RATE: 64 BPM

## 2023-09-08 PROCEDURE — G0299 HHS/HOSPICE OF RN EA 15 MIN: HCPCS

## 2023-09-08 PROCEDURE — G0151 HHCP-SERV OF PT,EA 15 MIN: HCPCS

## 2023-09-08 NOTE — HOME HEALTH
Routine Visit Note:  pt. sitting at dining room chair upon SN arrival, states she is doing ok, denies falls, states no pain    Skill/education provided: CP assess, pain assess, falls assess, incision assess    Patient/caregiver response: verbalized understanding    Plan for next visit: CP assess, pain assess, falls assess, incision assess    Other pertinent info: n/a

## 2023-09-11 ENCOUNTER — HOME CARE VISIT (OUTPATIENT)
Dept: HOME HEALTH SERVICES | Facility: HOME HEALTHCARE | Age: 88
End: 2023-09-11
Payer: MEDICARE

## 2023-09-11 ENCOUNTER — TELEPHONE (OUTPATIENT)
Dept: CARDIOLOGY | Facility: CLINIC | Age: 88
End: 2023-09-11

## 2023-09-11 VITALS
HEART RATE: 58 BPM | OXYGEN SATURATION: 96 % | RESPIRATION RATE: 18 BRPM | SYSTOLIC BLOOD PRESSURE: 118 MMHG | DIASTOLIC BLOOD PRESSURE: 66 MMHG | TEMPERATURE: 98.5 F

## 2023-09-11 PROCEDURE — G0299 HHS/HOSPICE OF RN EA 15 MIN: HCPCS

## 2023-09-11 NOTE — HOME HEALTH
Routine Visit Note:    Skill/education provided: Therapeutic/home exercise program, transfer teaching and gait training without assitive device    Patient/caregiver response: Independent in indoor ambulation with rolling walker, good progress coming to stand without compromising NWB order on lt le and making progress in gait skills without assistive device    Plan for next visit: Therapeutic/home exercise program to ble, transfer teaching and gait training without assistive device.

## 2023-09-11 NOTE — TELEPHONE ENCOUNTER
Caller: GONZALEZ MCLAIN    Relationship: Emergency Contact    Best call back number: [phone], PLEASE LEAVE MESSAGE    What is the best time to reach you: ANYTIME    Who are you requesting to speak with (clinical staff, provider,  specific staff member): STAFF    Do you know the name of the person who called: PEYTON    What was the call regarding:   PATIENT WANTS TO KNOW STATUS OF FMLA PAPERWORK. PLEASE REACH OUT TO PATIENT TO REPORT STATUS AND WHEN TO . WOULD LIKE TO  ON WEDNESDAY.    Is it okay if the provider responds through [employer]: YES

## 2023-09-12 ENCOUNTER — OFFICE VISIT (OUTPATIENT)
Dept: FAMILY MEDICINE CLINIC | Facility: CLINIC | Age: 88
End: 2023-09-12
Payer: MEDICARE

## 2023-09-12 VITALS
RESPIRATION RATE: 16 BRPM | DIASTOLIC BLOOD PRESSURE: 83 MMHG | HEART RATE: 59 BPM | OXYGEN SATURATION: 95 % | TEMPERATURE: 97.3 F | WEIGHT: 150.4 LBS | SYSTOLIC BLOOD PRESSURE: 135 MMHG | BODY MASS INDEX: 25.06 KG/M2 | HEIGHT: 65 IN

## 2023-09-12 DIAGNOSIS — Z09 HOSPITAL DISCHARGE FOLLOW-UP: Primary | ICD-10-CM

## 2023-09-12 DIAGNOSIS — I10 PRIMARY HYPERTENSION: ICD-10-CM

## 2023-09-12 DIAGNOSIS — Z95.0 STATUS POST PLACEMENT OF CARDIAC PACEMAKER: ICD-10-CM

## 2023-09-12 NOTE — PROGRESS NOTES
Transitional Care Follow Up Visit  Subjective     Aparna Matson is a 88 y.o. female who presents for a transitional care management visit.    Within 48 business hours after discharge our office contacted her via telephone to coordinate her care and needs.      I reviewed and discussed the details of that call along with the discharge summary, hospital problems, inpatient lab results, inpatient diagnostic studies, and consultation reports with Aparna.     Current outpatient and discharge medications have been reconciled for the patient.  Reviewed by: Matt Arana MD          8/30/2023     6:21 PM   Date of TCM Phone Call   [facility]   Date of Admission 8/26/2023   Date of Discharge 8/30/2023   Discharge Disposition Home-Health Care Svc     Risk for Readmission (LACE) No data recorded      History of Present Illness     Course During Hospital Stay:    Patient present for transitional care management.  She presented to [facility] on 8/26/2023 complaining of dizziness and presyncope. Patient she was sitting at her desk and became extremely dizzy and went to the couch and could not get up for a couple of hours. In the ER she was found to be in sinus bradycardic, heart rate in the 40s. At home she had been on Cardizem  mg which was discontinued on admission.   EKG was positive for sinus bradycardia with heart rate 48.  Slight elevation in troponin from 13-17. Potassium 3.4 BUN 22 and creatinine 1.16 Patient was evaluated by EP for symptomatic bradycardia in the hospital, recommend Pacemaker which was  placed on 08/29/23 with no postoperative complicaitons.  Today she is complaining of fatigue but denies dizziness, shortness of breath, chest pain, syncope and weakness.     The following portions of the patient's history were reviewed and updated as appropriate: past medical history, past social history, past surgical history, and problem list.    Review of Systems   Constitutional:   Positive for fatigue. Negative for activity change and diaphoresis.   Respiratory:  Negative for chest tightness, shortness of breath and wheezing.    Neurological:  Negative for dizziness, syncope, weakness and headaches.     Objective   Physical Exam  Vitals reviewed.   Cardiovascular:      Heart sounds: Normal heart sounds.   Pulmonary:      Effort: Pulmonary effort is normal.      Breath sounds: Normal breath sounds.   Abdominal:      Tenderness: There is no abdominal tenderness.   Neurological:      Mental Status: She is alert and oriented to person, place, and time.   Psychiatric:         Mood and Affect: Mood normal.     Vitals:    09/12/23 1021   BP: 135/83   Pulse: 59   Resp: 16   Temp: 97.3 °F (36.3 °C)   SpO2: 95%     Current Outpatient Medications on File Prior to Visit   Medication Sig Dispense Refill    aspirin 81 MG EC tablet Take 1 tablet by mouth Daily. Indications: prevention      cholecalciferol (VITAMIN D3) 25 MCG (1000 UT) tablet Take 1 tablet by mouth Daily.      losartan-hydrochlorothiazide (Hyzaar) 50-12.5 MG per tablet Take 1 tablet by mouth Daily. 90 tablet 3    pravastatin (PRAVACHOL) 40 MG tablet TAKE 1 TABLET EVERY DAY (Patient taking differently: No sig reported) 90 tablet 3    Synthroid 100 MCG tablet TAKE 1 TABLET EVERY DAY (Patient taking differently: No sig reported) 90 tablet 3     No current facility-administered medications on file prior to visit.         Assessment & Plan   Problems Addressed this Visit          Cardiac and Vasculature    Hypertension     Hypertension is improving with treatment.  Continue losartan/HCTZ.  Regular aerobic exercise.  Blood pressure will be reassessed in 3 months.         Status post placement of cardiac pacemaker       Health Encounters    Hospital discharge follow-up on dizziness - Primary     Pacemaker placement [facility] record reviewed admission note, discharge summary, consult report, labs, chest x-ray and imaging studies. The  patient was admitted with bradycardia and sick sinus syndrome.  She is a s/p Pacemaker placement.  She denies any new symptoms or complaint today.  Hospital medication list reviewed Cardizem is discontinued advise continue rest of medications as prescribed.          Diagnoses         Codes Comments    Hospital discharge follow-up on dizziness    -  Primary ICD-10-CM: Z09  ICD-9-CM: V67.59     Status post placement of cardiac pacemaker     ICD-10-CM: Z95.0  ICD-9-CM: V45.01     Primary hypertension     ICD-10-CM: I10  ICD-9-CM: 401.9

## 2023-09-13 ENCOUNTER — CLINICAL SUPPORT NO REQUIREMENTS (OUTPATIENT)
Dept: CARDIOLOGY | Facility: CLINIC | Age: 88
End: 2023-09-13
Payer: MEDICARE

## 2023-09-13 ENCOUNTER — HOME CARE VISIT (OUTPATIENT)
Dept: HOME HEALTH SERVICES | Facility: HOME HEALTHCARE | Age: 88
End: 2023-09-13
Payer: MEDICARE

## 2023-09-13 VITALS
TEMPERATURE: 97.7 F | DIASTOLIC BLOOD PRESSURE: 62 MMHG | RESPIRATION RATE: 16 BRPM | OXYGEN SATURATION: 97 % | SYSTOLIC BLOOD PRESSURE: 110 MMHG | HEART RATE: 61 BPM

## 2023-09-13 DIAGNOSIS — I49.5 SICK SINUS SYNDROME: Primary | ICD-10-CM

## 2023-09-13 DIAGNOSIS — Z95.0 STATUS POST PLACEMENT OF CARDIAC PACEMAKER: ICD-10-CM

## 2023-09-13 NOTE — PROGRESS NOTES
Patient is here today s/p pacemaker placement. Interrogation of device is normal, leads stable. Patient teaching for left arm precautions and device function. Patient's incision is healed, well approximated, no redness, swelling or drainage noted. Discussed follow up care, she has a follow up scheduled with VIANEY Garrett at this time.

## 2023-09-13 NOTE — HOME HEALTH
Routine Visit Note: Patient sitting at table at time of SN visit. Grandson present at visit. Patient states that she is doing ok. States that she thinks she is getting some strength back. States that it is easier for her to get up and down from the couch and the bed. Denies recent falls. Denies elimination issues. States that she is eating well. Incision is C/D/I with steri strips in place.    Skill/education provided: CP assess, falls/safety assess, med teaching/assess, incision care/assess, vitals    Patient/caregiver response: tolerated well    Plan for next visit: CP assess, falls/safety assess, med teaching/assess for changes, incision care/assess, infection prevention teaching    Other pertinent info: NA

## 2023-09-14 ENCOUNTER — HOME CARE VISIT (OUTPATIENT)
Dept: HOME HEALTH SERVICES | Facility: HOME HEALTHCARE | Age: 88
End: 2023-09-14
Payer: MEDICARE

## 2023-09-14 VITALS
OXYGEN SATURATION: 95 % | SYSTOLIC BLOOD PRESSURE: 126 MMHG | DIASTOLIC BLOOD PRESSURE: 70 MMHG | TEMPERATURE: 98.5 F | HEART RATE: 71 BPM | RESPIRATION RATE: 16 BRPM

## 2023-09-14 VITALS
TEMPERATURE: 97.8 F | RESPIRATION RATE: 16 BRPM | SYSTOLIC BLOOD PRESSURE: 116 MMHG | HEART RATE: 60 BPM | OXYGEN SATURATION: 99 % | DIASTOLIC BLOOD PRESSURE: 70 MMHG

## 2023-09-14 PROCEDURE — G0299 HHS/HOSPICE OF RN EA 15 MIN: HCPCS

## 2023-09-14 PROCEDURE — G0157 HHC PT ASSISTANT EA 15: HCPCS

## 2023-09-14 NOTE — HOME HEALTH
Routine Visit Note: Patient sitting at table at time of SN visit. Son present at visit. Patient states that she is doing well. Denies recent falls. Denies elimination issues. Incision is C/D/I. Steri strips removed this week from surgeon. Patient taking all meds as ordered. No recent med changes or issues. Son states that patient has been doing well. PT is coming later today.    Skill/education provided: CP assess, falls/safety prevention, incision care/assess, cardiac teaching, med teaching/assess for changes    Patient/caregiver response: verbalized understanding    Plan for next visit: CP assess, falls/safety assess, med teaching/assess for changes, incision care/assess, infection prevention teaching    Other pertinent info: NA

## 2023-09-15 ENCOUNTER — HOME CARE VISIT (OUTPATIENT)
Dept: HOME HEALTH SERVICES | Facility: HOME HEALTHCARE | Age: 88
End: 2023-09-15
Payer: MEDICARE

## 2023-09-15 PROCEDURE — G0157 HHC PT ASSISTANT EA 15: HCPCS

## 2023-09-15 NOTE — HOME HEALTH
pt sitting up visiting with her son on arrival,   recently had a nursing visit and reports no new issues.   no med changes or complications.    pt is motivated to participate and improve.  son requesting assessment of bed mobility.   pt agrees to this but admits she prefers to sleep in her recliner.        pt was limited today but tolerant of the activities performed but with cues needed to perform ther ex correctly and within the correct plane.   pt was educated on proper form with bed mobility with cues to properly log roll,  encouraged repositioning of the bedrail to the head end of the bed.      pt was fatigued to end PT session but was pleased to have participated.         plan for next session-   cont PT with gait trg, hep review, balance trg as tolerated to goals

## 2023-09-17 VITALS
TEMPERATURE: 97.7 F | OXYGEN SATURATION: 96 % | HEART RATE: 82 BPM | RESPIRATION RATE: 15 BRPM | DIASTOLIC BLOOD PRESSURE: 62 MMHG | SYSTOLIC BLOOD PRESSURE: 124 MMHG

## 2023-09-18 ENCOUNTER — HOME CARE VISIT (OUTPATIENT)
Dept: HOME HEALTH SERVICES | Facility: HOME HEALTHCARE | Age: 88
End: 2023-09-18
Payer: MEDICARE

## 2023-09-18 PROCEDURE — G0157 HHC PT ASSISTANT EA 15: HCPCS

## 2023-09-19 NOTE — HOME HEALTH
pt sitting up and is prepared for PT session,  feeling fair and is motivated to improve and return to plf.  pt has been attempting hep review as able but admits this has been rather limited.      pt was compliant and participated well with PT, but struggled at times with noted weakness and fatigue and rest periods needed.  pt was minimally unsteady upon standing but was able to self correct but with cues needed to properly position the LEs.  pt was educated on proper form with hep review with cues needed to perform correctly and within the correct plane.   pt was educated on proper step length during gait trg,  to avoid shuffling gait pattern.          plan for next session-  cont PT with gait trg, hep review, balance trg and advance as able with education as needed.

## 2023-09-21 ENCOUNTER — HOME CARE VISIT (OUTPATIENT)
Dept: HOME HEALTH SERVICES | Facility: HOME HEALTHCARE | Age: 88
End: 2023-09-21
Payer: MEDICARE

## 2023-09-21 VITALS
HEART RATE: 72 BPM | TEMPERATURE: 97.8 F | DIASTOLIC BLOOD PRESSURE: 64 MMHG | SYSTOLIC BLOOD PRESSURE: 132 MMHG | RESPIRATION RATE: 16 BRPM | OXYGEN SATURATION: 95 %

## 2023-09-21 VITALS
TEMPERATURE: 98.1 F | DIASTOLIC BLOOD PRESSURE: 72 MMHG | OXYGEN SATURATION: 99 % | RESPIRATION RATE: 17 BRPM | SYSTOLIC BLOOD PRESSURE: 116 MMHG | HEART RATE: 60 BPM

## 2023-09-21 PROCEDURE — G0299 HHS/HOSPICE OF RN EA 15 MIN: HCPCS

## 2023-09-21 PROCEDURE — G0157 HHC PT ASSISTANT EA 15: HCPCS

## 2023-09-21 NOTE — HOME HEALTH
pt up and ready for PT session,  reports she has been performing hep but with limitations.   son present and is assisting as needed.   pt feels she is slowly returning to plf.      pt was challenged today to stand for extended periods of time but this improved.   pt was educated on proper form with hep review, not yet ind with cues needed to perform within the correct plane.   pt was educated on proper posture/lumbar stab with standing ther ex and gait trg-  pt was minimally unsteady upon standing but no significant lob.  pt was educated today on proper lumbar stab techniques         plan for next session-  cont/dc PT as needed

## 2023-09-22 NOTE — HOME HEALTH
Routine Visit Note: Patient continues to recover well from pacemaker placement. Patient denies medication changes or issues. Daughter continues to assist with meds. Incision has healed well with no s/s of infection. Vitals WNL. Patient denies elimination issues.    Skill/education provided: CP assess, falls/safety assess, med teaching/assess for changes, incision assess, infection prevention teaching, cardiac education    Patient/caregiver response: verbalized understanding    Plan for next visit: CP assess, falls/safety assess, med teaching/assess for changes, f/u on appointment with surgeon, assess for s/s of infection, assess for dizzy spells    Other pertinent info: NA

## 2023-09-23 ENCOUNTER — TELEPHONE (OUTPATIENT)
Dept: FAMILY MEDICINE CLINIC | Facility: CLINIC | Age: 88
End: 2023-09-23
Payer: MEDICARE

## 2023-09-23 ENCOUNTER — HOME CARE VISIT (OUTPATIENT)
Dept: HOME HEALTH SERVICES | Facility: HOME HEALTHCARE | Age: 88
End: 2023-09-23
Payer: MEDICARE

## 2023-09-23 DIAGNOSIS — R22.9 LOCALIZED SOFT TISSUE SWELLING: Primary | ICD-10-CM

## 2023-09-23 RX ORDER — CEPHALEXIN 500 MG/1
500 CAPSULE ORAL 3 TIMES DAILY
Qty: 42 CAPSULE | Refills: 0 | Status: SHIPPED | OUTPATIENT
Start: 2023-09-23 | End: 2023-10-07

## 2023-09-23 RX ORDER — DOXYCYCLINE HYCLATE 100 MG/1
100 CAPSULE ORAL 2 TIMES DAILY
Qty: 28 CAPSULE | Refills: 0 | Status: SHIPPED | OUTPATIENT
Start: 2023-09-23 | End: 2023-10-07

## 2023-09-23 NOTE — TELEPHONE ENCOUNTER
Received call through [employer] at 1827 today. Spoke with Jane mehta/[facility] regarding patient Aparna Matson. Patient had pacemaker placed approximately one month ago. Today she noticed a lump in the bend of her elbow on same side as pacemaker insertion. Patient reported affected area is painful and warm to touch. Per home health, patient is not experiencing any chest pain, shortness of air or fever. Will start doxycycline 100 mg BID x 2 weeks and cephalexin 500 mg TID x 2 weeks. Patient will be advised to follow up with cardiology and request move up on currently scheduled appointment 9/29/23.

## 2023-09-24 ENCOUNTER — TELEPHONE (OUTPATIENT)
Dept: FAMILY MEDICINE CLINIC | Facility: CLINIC | Age: 88
End: 2023-09-24
Payer: MEDICARE

## 2023-09-24 NOTE — TELEPHONE ENCOUNTER
Call to home health today at approximately 0805; spoke to Jane.  Asked her to please follow up w/patient today regarding status of localized swelling in arm. Discussed potential for infection vs blood clot following pacemaker placement. If no change or improvement within 24-48 hrs of receiving antibiotic therapy, please notify office and can schedule doppler ultrasound. If symptoms progress or worsen, go to ER.

## 2023-09-25 ENCOUNTER — HOME CARE VISIT (OUTPATIENT)
Dept: HOME HEALTH SERVICES | Facility: HOME HEALTHCARE | Age: 88
End: 2023-09-25

## 2023-09-25 NOTE — ASSESSMENT & PLAN NOTE
Pacemaker placement [facility] record reviewed admission note, discharge summary, consult report, labs, chest x-ray and imaging studies. The patient was admitted with bradycardia and sick sinus syndrome.  She is a s/p Pacemaker placement.  She denies any new symptoms or complaint today.  Hospital medication list reviewed Cardizem is discontinued advise continue rest of medications as prescribed.

## 2023-09-25 NOTE — ASSESSMENT & PLAN NOTE
Hypertension is improving with treatment.  Continue losartan/HCTZ.  Regular aerobic exercise.  Blood pressure will be reassessed in 3 months.

## 2023-09-27 ENCOUNTER — TELEPHONE (OUTPATIENT)
Dept: FAMILY MEDICINE CLINIC | Facility: CLINIC | Age: 88
End: 2023-09-27

## 2023-09-27 ENCOUNTER — HOME CARE VISIT (OUTPATIENT)
Dept: HOME HEALTH SERVICES | Facility: HOME HEALTHCARE | Age: 88
End: 2023-09-27
Payer: MEDICARE

## 2023-09-27 VITALS
SYSTOLIC BLOOD PRESSURE: 122 MMHG | TEMPERATURE: 98 F | DIASTOLIC BLOOD PRESSURE: 70 MMHG | OXYGEN SATURATION: 98 % | HEART RATE: 70 BPM

## 2023-09-27 VITALS
SYSTOLIC BLOOD PRESSURE: 122 MMHG | TEMPERATURE: 97.1 F | RESPIRATION RATE: 16 BRPM | HEART RATE: 60 BPM | OXYGEN SATURATION: 98 % | DIASTOLIC BLOOD PRESSURE: 80 MMHG

## 2023-09-27 PROCEDURE — G0151 HHCP-SERV OF PT,EA 15 MIN: HCPCS

## 2023-09-27 PROCEDURE — G0299 HHS/HOSPICE OF RN EA 15 MIN: HCPCS

## 2023-09-27 NOTE — TELEPHONE ENCOUNTER
Caller: MONALISA    Relationship to patient: Other    Best call back number:     Patient is needing: PLEASE NOTIFY MONALISA TO LET HER KNOW IF DR. GLIL/CLIFF SOLORIO WILL ORDER AN ULTRASOUND OF THE PATIENT'S LEFT ARM.   MONALISA STATES THAT OVER THE WEEKEND, THE ON CALL NURSE SAW THE PATIENT AND SHE HAD A LUMP IN THE BEND OF HER LEFT ARM.   SHE NOTES THAT THE PATIENT HAD A PACEMAKER PLACED IN THAT SAME ARM 3-4 WEEKS AGO.   CLIFF PLACED PATIENT ON AN ANTIBIOTIC AND STATED IF THE LUMP WAS NOT BETTER IN 48 HOURS, TO CONTACT THE OFFICE AND SHE WOULD PLACE AN ORDER FOR AN ULTRASOUND OF HER LEFT ARM.   MONALISA STATES THE LUMP IS A LITTLE BIGGER AND TO PLEASE PLACE THE ORDER AND CONTACT MONALISA WHEN IT HAS BEEN PLACED.       MONALISA IS UPLOADING A PICTURE OF PATIENT'S ARM INTO [facility].

## 2023-09-27 NOTE — HOME HEALTH
"Routine Visit Note: Patient and daughter present at visit. Daughter states that patient had a \"lump\" come up on the bend of her L arm over the weekend. States that she spoke with an on call nurse from [facility] and she got ahold of PCP who ordered an oral ABT with orders to get into cardiology asap. Daughter states that cardiologist could not see patient until Fri this week. Notes in epic state that if \"lump\" has not decreased in size within 48 hrs, to call the office and they will order an ultrasound. Daughter and patient state that it has not changed in size. No redness or swelling to LUE. No pain. SN placed call to PCP to let them know there are no changes. Photo uploaded to [employer]. SN never received return call. SN called PCP office x2 on 9/28 and SN left message with medical assistant. No return call.     Skill/education provided: CP assess, falls/safety assess, med teaching/assess for changes, incision care/assess, assess new area to LUE    Patient/caregiver response: tolerated well    Plan for next visit: CP assess, falls/safety assess, med teaching/assess for changes, incision care/assess, assess area on LUE    Other pertinent info: NA"

## 2023-09-29 ENCOUNTER — HOSPITAL ENCOUNTER (EMERGENCY)
Facility: HOSPITAL | Age: 88
Discharge: HOME | End: 2023-09-29
Attending: EMERGENCY MEDICINE
Payer: MEDICARE

## 2023-09-29 ENCOUNTER — APPOINTMENT (OUTPATIENT)
Dept: CT IMAGING | Facility: HOSPITAL | Age: 88
End: 2023-09-29
Payer: MEDICARE

## 2023-09-29 ENCOUNTER — OFFICE VISIT (OUTPATIENT)
Dept: CARDIOLOGY | Facility: CLINIC | Age: 88
End: 2023-09-29
Payer: MEDICARE

## 2023-09-29 ENCOUNTER — HOSPITAL ENCOUNTER (OUTPATIENT)
Dept: CARDIOLOGY | Facility: HOSPITAL | Age: 88
Discharge: HOME | End: 2023-09-29
Payer: MEDICARE

## 2023-09-29 VITALS
BODY MASS INDEX: 24.9 KG/M2 | OXYGEN SATURATION: 97 % | HEART RATE: 61 BPM | WEIGHT: 149.47 LBS | DIASTOLIC BLOOD PRESSURE: 87 MMHG | TEMPERATURE: 97.3 F | SYSTOLIC BLOOD PRESSURE: 142 MMHG | HEIGHT: 65 IN | RESPIRATION RATE: 16 BRPM

## 2023-09-29 VITALS
OXYGEN SATURATION: 93 % | WEIGHT: 149 LBS | HEIGHT: 65 IN | RESPIRATION RATE: 18 BRPM | SYSTOLIC BLOOD PRESSURE: 128 MMHG | HEART RATE: 60 BPM | DIASTOLIC BLOOD PRESSURE: 82 MMHG | BODY MASS INDEX: 24.83 KG/M2

## 2023-09-29 DIAGNOSIS — L98.9 SKIN LESION OF LEFT ARM: ICD-10-CM

## 2023-09-29 DIAGNOSIS — M79.602 LEFT ARM PAIN: Primary | ICD-10-CM

## 2023-09-29 DIAGNOSIS — I10 HYPERTENSION, UNSPECIFIED TYPE: ICD-10-CM

## 2023-09-29 DIAGNOSIS — I82.A12 ACUTE DEEP VEIN THROMBOSIS (DVT) OF AXILLARY VEIN OF LEFT UPPER EXTREMITY: ICD-10-CM

## 2023-09-29 DIAGNOSIS — E78.2 MIXED HYPERLIPIDEMIA: ICD-10-CM

## 2023-09-29 DIAGNOSIS — M79.89 LEFT ARM SWELLING: ICD-10-CM

## 2023-09-29 DIAGNOSIS — I49.5 SICK SINUS SYNDROME: ICD-10-CM

## 2023-09-29 DIAGNOSIS — Z95.0 STATUS POST PLACEMENT OF CARDIAC PACEMAKER: ICD-10-CM

## 2023-09-29 DIAGNOSIS — Z09 HOSPITAL DISCHARGE FOLLOW-UP: Primary | ICD-10-CM

## 2023-09-29 LAB
ANION GAP SERPL CALCULATED.3IONS-SCNC: 10 MMOL/L (ref 5–15)
BASOPHILS # BLD AUTO: 0.1 10*3/MM3 (ref 0–0.2)
BASOPHILS NFR BLD AUTO: 1.4 % (ref 0–1.5)
BH CV UPPER VENOUS LEFT AXILLARY AUGMENT: NORMAL
BH CV UPPER VENOUS LEFT AXILLARY COLOR: 1
BH CV UPPER VENOUS LEFT AXILLARY COMPRESS: NORMAL
BH CV UPPER VENOUS LEFT AXILLARY PHASIC: NORMAL
BH CV UPPER VENOUS LEFT AXILLARY SPONT: NORMAL
BH CV UPPER VENOUS LEFT AXILLARY THROMBUS: NORMAL
BH CV UPPER VENOUS LEFT BASILIC FOREARM COMPRESS: NORMAL
BH CV UPPER VENOUS LEFT BASILIC UPPER COLOR: 1
BH CV UPPER VENOUS LEFT BASILIC UPPER COMPRESS: NORMAL
BH CV UPPER VENOUS LEFT BASILIC UPPER THROMBUS: NORMAL
BH CV UPPER VENOUS LEFT BRACHIAL COMPRESS: NORMAL
BH CV UPPER VENOUS LEFT CEPHALIC FOREARM COLOR: 1
BH CV UPPER VENOUS LEFT CEPHALIC FOREARM COMPRESS: NORMAL
BH CV UPPER VENOUS LEFT CEPHALIC FOREARM THROMBUS: NORMAL
BH CV UPPER VENOUS LEFT CEPHALIC UPPER COMPRESS: NORMAL
BH CV UPPER VENOUS LEFT INTERNAL JUGULAR AUGMENT: NORMAL
BH CV UPPER VENOUS LEFT INTERNAL JUGULAR COMPRESS: NORMAL
BH CV UPPER VENOUS LEFT INTERNAL JUGULAR PHASIC: NORMAL
BH CV UPPER VENOUS LEFT INTERNAL JUGULAR SPONT: NORMAL
BH CV UPPER VENOUS LEFT MED CUBITAL COLOR: 1
BH CV UPPER VENOUS LEFT MED CUBITAL COMPRESS: NORMAL
BH CV UPPER VENOUS LEFT MED CUBITAL THROMBUS: NORMAL
BH CV UPPER VENOUS LEFT RADIAL COMPRESS: NORMAL
BH CV UPPER VENOUS LEFT SUBCLAVIAN AUGMENT: NORMAL
BH CV UPPER VENOUS LEFT SUBCLAVIAN COLOR: 1
BH CV UPPER VENOUS LEFT SUBCLAVIAN COMPRESS: NORMAL
BH CV UPPER VENOUS LEFT SUBCLAVIAN PHASIC: NORMAL
BH CV UPPER VENOUS LEFT SUBCLAVIAN SPONT: NORMAL
BH CV UPPER VENOUS LEFT SUBCLAVIAN THROMBUS: NORMAL
BH CV UPPER VENOUS LEFT ULNAR COMPRESS: NORMAL
BH CV UPPER VENOUS RIGHT INTERNAL JUGULAR AUGMENT: NORMAL
BH CV UPPER VENOUS RIGHT INTERNAL JUGULAR COMPRESS: NORMAL
BH CV UPPER VENOUS RIGHT INTERNAL JUGULAR PHASIC: NORMAL
BH CV UPPER VENOUS RIGHT INTERNAL JUGULAR SPONT: NORMAL
BH CV UPPER VENOUS RIGHT SUBCLAVIAN AUGMENT: NORMAL
BH CV UPPER VENOUS RIGHT SUBCLAVIAN COMPRESS: NORMAL
BH CV UPPER VENOUS RIGHT SUBCLAVIAN PHASIC: NORMAL
BH CV UPPER VENOUS RIGHT SUBCLAVIAN SPONT: NORMAL
BH CV VAS PRELIMINARY FINDINGS SCRIPTING: 1
BUN SERPL-MCNC: 24 MG/DL (ref 8–23)
BUN/CREAT SERPL: 21.1 (ref 7–25)
CALCIUM SPEC-SCNC: 10.4 MG/DL (ref 8.6–10.5)
CHLORIDE SERPL-SCNC: 103 MMOL/L (ref 98–107)
CO2 SERPL-SCNC: 25 MMOL/L (ref 22–29)
CREAT SERPL-MCNC: 1.14 MG/DL (ref 0.57–1)
DEPRECATED RDW RBC AUTO: 43.3 FL (ref 37–54)
EGFRCR SERPLBLD CKD-EPI 2021: 47 ML/MIN/1.73
EOSINOPHIL # BLD AUTO: 0.1 10*3/MM3 (ref 0–0.4)
EOSINOPHIL NFR BLD AUTO: 1.3 % (ref 0.3–6.2)
ERYTHROCYTE [DISTWIDTH] IN BLOOD BY AUTOMATED COUNT: 13.5 % (ref 12.3–15.4)
GLUCOSE SERPL-MCNC: 123 MG/DL (ref 65–99)
HCT VFR BLD AUTO: 41.7 % (ref 34–46.6)
HGB BLD-MCNC: 13.9 G/DL (ref 12–15.9)
LYMPHOCYTES # BLD AUTO: 2 10*3/MM3 (ref 0.7–3.1)
LYMPHOCYTES NFR BLD AUTO: 26.4 % (ref 19.6–45.3)
MCH RBC QN AUTO: 30.7 PG (ref 26.6–33)
MCHC RBC AUTO-ENTMCNC: 33.3 G/DL (ref 31.5–35.7)
MCV RBC AUTO: 92.2 FL (ref 79–97)
MONOCYTES # BLD AUTO: 0.5 10*3/MM3 (ref 0.1–0.9)
MONOCYTES NFR BLD AUTO: 6.4 % (ref 5–12)
NEUTROPHILS NFR BLD AUTO: 5 10*3/MM3 (ref 1.7–7)
NEUTROPHILS NFR BLD AUTO: 64.5 % (ref 42.7–76)
NRBC BLD AUTO-RTO: 0 /100 WBC (ref 0–0.2)
PLATELET # BLD AUTO: 169 10*3/MM3 (ref 140–450)
PMV BLD AUTO: 11.3 FL (ref 6–12)
POTASSIUM SERPL-SCNC: 4 MMOL/L (ref 3.5–5.2)
RBC # BLD AUTO: 4.53 10*6/MM3 (ref 3.77–5.28)
SODIUM SERPL-SCNC: 138 MMOL/L (ref 136–145)
WBC NRBC COR # BLD: 7.8 10*3/MM3 (ref 3.4–10.8)
WHOLE BLOOD HOLD COAG: NORMAL

## 2023-09-29 PROCEDURE — 80048 BASIC METABOLIC PNL TOTAL CA: CPT | Performed by: PHYSICIAN ASSISTANT

## 2023-09-29 PROCEDURE — 93971 EXTREMITY STUDY: CPT

## 2023-09-29 PROCEDURE — 36415 COLL VENOUS BLD VENIPUNCTURE: CPT

## 2023-09-29 PROCEDURE — 73200 CT UPPER EXTREMITY W/O DYE: CPT

## 2023-09-29 PROCEDURE — 85025 COMPLETE CBC W/AUTO DIFF WBC: CPT | Performed by: PHYSICIAN ASSISTANT

## 2023-09-29 PROCEDURE — 99284 EMERGENCY DEPT VISIT MOD MDM: CPT

## 2023-09-29 RX ORDER — SODIUM CHLORIDE 0.9 % (FLUSH) 0.9 %
10 SYRINGE (ML) INJECTION AS NEEDED
Status: DISCONTINUED | OUTPATIENT
Start: 2023-09-29 | End: 2023-09-29 | Stop reason: HOSPADM

## 2023-09-29 RX ADMIN — APIXABAN 10 MG: 5 TABLET, FILM COATED ORAL at 16:33

## 2023-09-29 RX ADMIN — SODIUM CHLORIDE 500 ML: 9 INJECTION, SOLUTION INTRAVENOUS at 14:22

## 2023-09-29 NOTE — CONSULTS
Name: Aparna Matson ADMIT: 2023   : 1935  PCP: Matt Arana MD    MRN: [id number] LOS: 0 days   AGE/SEX: 88 y.o. female  ROOM: [facility]      Patient Care Team:  Matt Arana MD as PCP - General (Family Medicine)  Matt Arana MD as PCP - Family Medicine  Surya Johnson MD as Consulting Physician (Cardiology)  Chief Complaint   Patient presents with    Arm Swelling     Pt had pacemaker placed, knot appeared in left arm, had ultrasound, found blood clots, adv to come to ed      CC: knot on left arm    Subjective     Inpatient Vascular Surgery Consult  Consult performed by: Fatuma Jones APRN  Consult ordered by: Fede Gracia PA  Reason for consult: LUE DVT      History of Present Illness  Patient is an 88-year-old female who was recently admitted last month with dizziness related to sick sinus syndrome and underwent a stent of dual-chamber pacemaker on 2023.  Patient reports that she was doing well without any complaints until about 5 or 6 days ago when she noticed a hard knot in the left arm AC fossa area.  She was seen by her PCP and started on an antibiotic which does not seem to be helping.  She was seen by cardiology in postop follow-up this morning and left upper extremity venous duplex was ordered and shows acute DVT involving the left subclavian and axillary veins along with acute superficial vein thrombosis in the left basilic vein in the upper arm, cephalic vein in the forearm, and median cubital veins.  Report also mentions an irregularly shaped circumscribed hyperechoic vascularized mass in the left AC fossa which architecturally does not resemble a lymph node but may be an inflamed lymph node.  Patient denies any prior history of blood clots or clotting disorders and denies any known family history of clots.  Denies any shortness of breath or chest pain.  Other than the area and her AC fossa, she does not have any left upper extremity pain  or swelling.    Review of Systems   Constitutional:  Negative for chills and fever.   Respiratory:  Negative for cough and shortness of breath.    Cardiovascular:  Negative for chest pain and leg swelling.   Skin:  Negative for color change.   Neurological:  Negative for dizziness, weakness and light-headedness.   Psychiatric/Behavioral:  Negative for agitation and confusion.      Past Medical History:   Diagnosis Date    Abnormal mammogram of right breast     Atrial fibrillation     Breast cyst, right     Breast lump     BREAST LUMPS    Chickenpox     Dizziness     Ductal carcinoma in situ of right breast     Elevated serum creatinine     Hearing loss     Hemorrhoids     Hyperlipidemia     Hypertension     Hypothyroidism     Iliac artery aneurysm, left     Knee pain, left     Leg cramps     Leg pain, left     Mass of breast, right     Measles     Memory impairment     Mumps     Other specified local infections of the skin and subcutaneous tissue     SORE AT LEFT ANKLE     Otitis media, acute     RIGHT     Postmenopausal status     Sinus disorder     SINUS    Skin lesion of face     LEFT TEMPLE     Skin lesion of wrist     LEFT FOREARM    Varicose veins of both lower extremities     Whooping cough      Past Surgical History:   Procedure Laterality Date    BREAST LUMPECTOMY Left 1994    CARDIAC ELECTROPHYSIOLOGY PROCEDURE N/A 08/29/2023    Procedure: Pacemaker DC new, [employer] aware $;  Surgeon: Eric Cadena MD;  Location: [facility] CATH INVASIVE LOC[facility];  Service: Cardiovascular;  Laterality: N/A;    HYSTERECTOMY      INSERT / REPLACE / REMOVE PACEMAKER  08/26/2023?    MASTECTOMY Right 09/25/2017    DR. FUNG    THYROIDECTOMY, PARTIAL  1961     Family History   Problem Relation Age of Onset    Heart attack Father 67        AMI    Aneurysm Brother         RUPTURED     Heart disease Other     Hypertension Other     Anuerysm Other         AAA- ABDOMINAL AORTIC ANUERYSM     Diabetes Other        Social  "History     Tobacco Use    Smoking status: Never    Smokeless tobacco: Never   Vaping Use    Vaping Use: Never used   Substance Use Topics    Alcohol use: Not Currently    Drug use: Never     (Not in a hospital admission)            Insert Peripheral IV **AND** sodium chloride  Codeine    Objective  resting in bed in ED layne, family at bedside     Physical Exam:  Physical Exam  Vitals and nursing note reviewed.   Constitutional:       General: She is not in acute distress.     Appearance: Normal appearance.   HENT:      Head: Normocephalic.      Right Ear: Tympanic membrane normal.      Mouth/Throat:      Mouth: Mucous membranes are moist.   Eyes:      Pupils: Pupils are equal, round, and reactive to light.   Cardiovascular:      Pulses: Normal pulses.   Pulmonary:      Effort: Pulmonary effort is normal. No respiratory distress.   Abdominal:      Palpations: Abdomen is soft.   Skin:     General: Skin is warm.      Capillary Refill: Capillary refill takes less than 2 seconds.      Comments: Hardened nodule in left AC fossa, ~ 1.5-2 cm in size that is mildly tender to palpation. No redness or warmth noted. No significant LUE edema present.   Well-healed area and left chest from recent pacemaker placement.   Neurological:      General: No focal deficit present.      Mental Status: She is alert and oriented to person, place, and time. Mental status is at baseline.   Psychiatric:         Mood and Affect: Mood normal.         Behavior: Behavior normal.         Thought Content: Thought content normal.        Vital Signs and Labs:  Vital Signs Patient Vitals for the past 24 hrs:   BP Temp Temp src Pulse Resp SpO2 Height Weight   09/29/23 1343 142/78 -- -- 60 -- 99 % -- --   09/29/23 1242 146/90 -- -- -- -- -- -- --   09/29/23 1241 -- -- -- -- -- -- 165.1 cm (65\") 67.8 kg (149 lb 7.6 oz)   09/29/23 1239 -- 97.3 °F (36.3 °C) Oral 61 18 98 % -- --     I/O:  No intake/output data recorded.    CBC    Results from last 7 days "   Lab Units 09/29/23  1329   WBC 10*3/mm3 7.80   HEMOGLOBIN g/dL 13.9   PLATELETS 10*3/mm3 169     BMP   Results from last 7 days   Lab Units 09/29/23  1329   SODIUM mmol/L 138   POTASSIUM mmol/L 4.0   CHLORIDE mmol/L 103   CO2 mmol/L 25.0   BUN mg/dL 24*   CREATININE mg/dL 1.14*   GLUCOSE mg/dL 123*     Coag     Radiology(recent) No radiology results for the last day    There are no hospital problems to display for this patient.    @[email or web address] [email or web address]@  [id number]    Assessment & Plan       * No active hospital problems. *      88 y.o. female who underwent recent pacemaker placement last month for sick sinus syndrome and presented to the hospital today from cardiology office with concerns about nodule in left AC fossa.  Venous duplex was ordered and shows acute DVT involving the left subclavian and axillary veins along with acute superficial vein thrombosis in the left basilic vein in the upper arm, cephalic vein in the forearm, and median cubital veins.  Report also mentions an irregularly shaped circumscribed hyperechoic vascularized mass in the left AC fossa which architecturally does not resemble a lymph node but may be an inflamed lymph node.  In regards to left upper extremity DVT, would recommend DOAC for at least 6 months and possibly lifelong. We will defer ongoing workup of nodule in left AC fossa to primary team.     I discussed the patients findings and my recommendations with patient, family, and consulting provider.    Fatuma Jones, APRN  09/29/23  14:03 EDT    Please call my office with any question: [phone]

## 2023-09-29 NOTE — ED NOTES
Pt and daughter at bedside report pt had pacemaker placed 1 month ago, pt reports intermittent pain and swelling this week to left arm. Pt had vascular US done earlier today per Dr Johnson, pt was called and told to come to ED for admission d/t findings of blood clots to YASIR. Pt denies currently taking any blood thinners at this time. Denies any CP or SOA.

## 2023-09-29 NOTE — Clinical Note
[facility] EMERGENCY DEPARTMENT  [address] [place] [address]  Phone: [phone]    Aparna Matson was seen and treated in our emergency department on 9/29/2023.  She may return to work on 09/30/2023.         Thank you for choosing [facility].    Fede Gracia PA

## 2023-09-29 NOTE — ED PROVIDER NOTES
Subjective   History of Present Illness      Patient 88-year-old female comes in complaining of left upper extremity pain and swelling intermittent for the past week.  Patient reports some intermittent pain of the back left upper extremity SR about a week ago however this improved.  However about a week ago patient also reported some localized swelling in which she had a lump in her left AC area.  Patient denies any pain in this area.  Of note, patient follows with Dr. Johnson and had a pacemaker placed for sick sinus syndrome 1 month ago.  Patient denies being on any blood thinners other than baby aspirin.  Patient denies any history of blood clots.  Family at bedside to confirm story as well.  Patient denies any chest pain, shortness of breath, lightheadedness, syncopal episode.      Review of Systems   Constitutional:  Negative for chills, fatigue and fever.   HENT:  Negative for congestion, sore throat, tinnitus and trouble swallowing.    Eyes:  Negative for photophobia, discharge and visual disturbance.   Respiratory:  Negative for cough and shortness of breath.    Cardiovascular:  Negative for chest pain and leg swelling.   Gastrointestinal:  Negative for abdominal pain, diarrhea, nausea and vomiting.   Genitourinary:  Negative for dysuria, flank pain and urgency.   Musculoskeletal:  Negative for arthralgias and myalgias.        Left arm swelling and pain   Skin:  Negative for rash.   Neurological:  Negative for dizziness and headaches.   Psychiatric/Behavioral:  Negative for confusion.      Past Medical History:   Diagnosis Date    Abnormal mammogram of right breast     Atrial fibrillation     Breast cyst, right     Breast lump     BREAST LUMPS    Chickenpox     Dizziness     Ductal carcinoma in situ of right breast     Elevated serum creatinine     Hearing loss     Hemorrhoids     Hyperlipidemia     Hypertension     Hypothyroidism     Iliac artery aneurysm, left     Knee pain, left     Leg cramps     Leg pain,  left     Mass of breast, right     Measles     Memory impairment     Mumps     Other specified local infections of the skin and subcutaneous tissue     SORE AT LEFT ANKLE     Otitis media, acute     RIGHT     Postmenopausal status     Sinus disorder     SINUS    Skin lesion of face     LEFT TEMPLE     Skin lesion of wrist     LEFT FOREARM    Varicose veins of both lower extremities     Whooping cough        Allergies   Allergen Reactions    Codeine Hallucinations       Past Surgical History:   Procedure Laterality Date    BREAST LUMPECTOMY Left 1994    CARDIAC ELECTROPHYSIOLOGY PROCEDURE N/A 08/29/2023    Procedure: Pacemaker DC new, [employer] aware $;  Surgeon: Eric Cadena MD;  Location: [facility] CATH INVASIVE LOCATION;  Service: Cardiovascular;  Laterality: N/A;    HYSTERECTOMY      INSERT / REPLACE / REMOVE PACEMAKER  08/26/2023?    MASTECTOMY Right 09/25/2017    DR. FUNG    THYROIDECTOMY, PARTIAL  1961       Family History   Problem Relation Age of Onset    Heart attack Father 67        AMI    Aneurysm Brother         RUPTURED     Heart disease Other     Hypertension Other     Anuerysm Other         AAA- ABDOMINAL AORTIC ANUERYSM     Diabetes Other        Social History     Socioeconomic History    Marital status:    Tobacco Use    Smoking status: Never    Smokeless tobacco: Never   Vaping Use    Vaping Use: Never used   Substance and Sexual Activity    Alcohol use: Not Currently    Drug use: Never    Sexual activity: Not Currently     Birth control/protection: Hysterectomy           Objective   Physical Exam  Vitals and nursing note reviewed.   Constitutional:       General: She is not in acute distress.     Appearance: Normal appearance. She is well-developed. She is not diaphoretic.   HENT:      Head: Normocephalic and atraumatic.      Right Ear: External ear normal.      Left Ear: External ear normal.      Nose: Nose normal.      Mouth/Throat:      Pharynx: No oropharyngeal exudate.    Eyes:      Extraocular Movements: Extraocular movements intact.      Conjunctiva/sclera: Conjunctivae normal.      Pupils: Pupils are equal, round, and reactive to light.   Cardiovascular:      Rate and Rhythm: Normal rate and regular rhythm.      Pulses: Normal pulses.      Heart sounds: Normal heart sounds.      Comments: S1, S2 audible.  Pulmonary:      Effort: Pulmonary effort is normal. No respiratory distress.      Breath sounds: Normal breath sounds. No wheezing, rhonchi or rales.      Comments: On room air.  Abdominal:      General: Bowel sounds are normal. There is no distension.      Palpations: Abdomen is soft.      Tenderness: There is no abdominal tenderness.   Musculoskeletal:         General: No tenderness or deformity. Normal range of motion.      Cervical back: Normal range of motion.      Right lower leg: No edema.      Left lower leg: No edema.   Skin:     General: Skin is warm.      Capillary Refill: Capillary refill takes less than 2 seconds.      Findings: No erythema or rash.      Comments: Patient has good radial and ulnar pulse and sensation of left upper extremity appropriately.  Patient does have localized quarter sized area of swelling of the left AC.  Patient has no significant swelling of left upper extremity or rashes or wounds of left upper extremity compared to right upper extremity   Neurological:      Mental Status: She is alert and oriented to person, place, and time.      Cranial Nerves: No cranial nerve deficit.   Psychiatric:         Mood and Affect: Mood normal.         Behavior: Behavior normal.       Procedures           ED Course  ED Course as of 09/29/23 2024   Fri Sep 29, 2023   1322 Call out to vascular surgery [RL]   1353 Spoke with LEISA Larson, who did not feel patient needed to be admitted from vascular standpoint and would like patient anticoagulated. [RL]   1355 Pacemaker placed on 08/29/23 [RL]   1417 Spoke with LEISA Jerez with cardiology on-call  for Dr. Johnson who agrees with patient to be following up outpatient on anticoagulation if vascular surgery is okay from that standpoint. [RL]   1713 Call out to Vascular surgery. [RL]      ED Course User Index  [RL] Fede Gracia PA      Labs Reviewed   BASIC METABOLIC PANEL - Abnormal; Notable for the following components:       Result Value    Glucose 123 (*)     BUN 24 (*)     Creatinine 1.14 (*)     eGFR 47.0 (*)     All other components within normal limits    Narrative:     GFR Normal >60  Chronic Kidney Disease <60  Kidney Failure <15    The GFR formula is only valid for adults with stable renal function between ages 18 and 70.   CBC WITH AUTO DIFFERENTIAL - Normal   CBC AND DIFFERENTIAL    Narrative:     The following orders were created for panel order CBC & Differential.  Procedure                               Abnormality         Status                     ---------                               -----------         ------                     CBC Auto Differential[[id number]]        Normal              Final result                 Please view results for these tests on the individual orders.   EXTRA TUBES    Narrative:     The following orders were created for panel order Extra Tubes.  Procedure                               Abnormality         Status                     ---------                               -----------         ------                     Light Blue Top[[id number]]                                   Final result                 Please view results for these tests on the individual orders.   LIGHT BLUE TOP                                            Medical Decision Making  Problems Addressed:  Left arm pain: complicated acute illness or injury    Amount and/or Complexity of Data Reviewed  Labs: ordered.    Risk  Prescription drug management.      Differential diagnosis: DVT, PE, not meant to be an all-inclusive list    Chart review: Patient was seen earlier today at 9 AM with cardiology, Renate  "LEISA Garrett, patient was seen for follow-up of sick sinus syndrome status postplacement of cardiac pacemaker.  Patient was ordered to have knot in left AC and duplex of left upper extremity to rule out DVT was ordered.  Patient follows with Dr. Novoa and Dr. Johnson with cardiology    EKG:  not indicated    Imaging:    CT Upper Extremity Left Without Contrast   Final Result   Impression:   1.9 x 1.7 x 2.3 cm soft tissue-density mass within the superficial left antecubital fossa soft tissues. A vessel and is contiguous to it. Although this may represent a solid mass lesion, vascular etiology such as aneurysm or pseudoaneurysm cannot be    excluded. Consider correlation with either postcontrast CT imaging or vascular ultrasound, particularly if percutaneous biopsy or surgical intervention is contemplated..            Electronically Signed: Aubrie Burden MD     9/29/2023 4:51 PM EDT     Workstation ID: [id number]        Labs: Lab work independently interpreted by myself shows CBC and BMP largely unremarkable for acute findings.    Vitals:  /87 (BP Location: Left arm, Patient Position: Lying)   Pulse 61   Temp 97.3 °F (36.3 °C) (Oral)   Resp 16   Ht 165.1 cm (65\")   Wt 67.8 kg (149 lb 7.6 oz)   SpO2 97%   BMI 24.87 kg/m²    Medications given:    Medications   sodium chloride 0.9 % bolus 500 mL (0 mL Intravenous Stopped 9/29/23 1620)   apixaban (ELIQUIS) tablet 10 mg (10 mg Oral Given 9/29/23 1633)       Procedures:  not indicated  MDM: Patient is an 88-year-old female comes in complaining of some left upper arm pain and localized swelling over the left AC.  Lab work independently interpreted by myself shows CBC and BMP largely unremarkable for acute findings.  Patient was given 500 normal saline bolus as patient has a very slight bump of serum creatinine 1.1.  I spoke with both on-call cardiology and the on-call vascular surgery and agreed with plan for follow-up as an outpatient and started on Eliquis.  " Vascular surgery did want a CT scan of left upper extremity and these results were reviewed with on-call vascular surgeon,  who wanted patient to follow-up outpatient with them and nothing to do from a vascular standpoint at this time.  Questions were answered appropriately  with family.  Patient instructed to follow-up with general surgery if localized swelling persists. See full discharge instructions for further details.  Plan discussed with patient and is agreeable with plan.      Final diagnoses:   Left arm pain   Acute deep vein thrombosis (DVT) of axillary vein of left upper extremity   Skin lesion of left arm       ED Disposition  ED Disposition       ED Disposition   Discharge    Condition   Stable    Comment   --               [facility] EMERGENCY DEPARTMENT  [address] [address]  [phone]  Go in 1 day  As needed, If symptoms worsen    Matt Arana MD  [address]  [place] [facility]  [phone]    Call   As needed    Jemima Newsome MD  [address]  [place] [facility]  [phone]    In 1 week  For new primary care provider if needed    Surya Johnson MD  [address]  [place] [facility]  [phone]    Schedule an appointment as soon as possible for a visit in 1 week  For cardiology follow-up.    Devaughn May MD  [address]  [place] [facility]  [phone]    Schedule an appointment as soon as possible for a visit in 1 week  For follow-up with vascular surgery in 1 week's time.    Timbo Austin MD  [address]  [place] [facility]  [phone]    Schedule an appointment as soon as possible for a visit in 1 week  For general surgery follow-up for further investigation of arm lesion    PATIENT CONNECTION - [facility] [address]  [phone]  Schedule an appointment as soon as possible for a visit in 1 week  As needed if you still need further direction for a  new primary care provider    Sukhjinder Engel MD  Count includes the [facility] [place] [address] [phone]    Follow up in 6 week(s)  left upper ext venous duplex to follow up DVT and see APRN or doctor         Medication List        New Prescriptions      Apixaban Starter Pack tablet therapy pack  Take two 5 mg tablets by mouth every 12 hours for 7 days. Followed by one 5 mg tablet every 12 hours. (Dispense starter pack if available)               Where to Get Your Medications        These medications were sent to [facility] [id number] - [place], [place] - [address] - [phone]  - [phone] FX  [address] [place] [address]      Phone: [phone]   Apixaban Starter Pack tablet therapy pack            Fede Gracia PA  09/29/23 2024

## 2023-09-29 NOTE — DISCHARGE INSTRUCTIONS
Please take Eliquis as prescribed.  We do not have an indication for further antibiotics at this time.  Please come back to the ER if you have chest pain or shortness of breath or spike high fevers you will need reevaluation at that time.  Please follow-up with vascular surgery, , in 1 week's time for vascular surgery follow-up.  Please follow-up with general surgery regarding the lesion of the arm.  Please follow-up with cardiology as well in 1 week's time.  Can call  patient connection number above if you are having issues and have a new primary care provider

## 2023-09-29 NOTE — Clinical Note
[facility] EMERGENCY DEPARTMENT  [address] [place] [address]  Phone: [phone]    Aparna Matson was seen and treated in our emergency department on 9/29/2023.  She may return to work on 09/30/2023.         Thank you for choosing [facility].    Fede Gracia PA

## 2023-09-29 NOTE — PROGRESS NOTES
Review of CT and discussion with nurse practitioner in ER indicates this mobile nonpulsatile mass is likely soft tissue and will be evaluated by general surgery as an outpatient.  Duplex did not indicate vascularity to the area.  Patient will see our service in 6 weeks in the office with repeat left upper extremity venous duplex to assess stability versus hopeful regression of left subclavian DVT.

## 2023-09-29 NOTE — PROGRESS NOTES
Cardiology Office Follow Up Visit      Primary Care Provider:  Matt Arana MD    Reason for f/u:     Hospital discharge follow up      Subjective     CC:    Hospital discharge follow up, knot in antecubital space on left arm    History of Present Illness       Aparna Matson is a 88 y.o. female who is a patient of Dr. Johnson. Pmh includes HTN, HLD, hypothyroidism, breast CA s/p right mastectomy, pAFib during COVID.    Mr. Matson presented to [facility] ER with dizziness and lightheaded. She was noted to be bradycardic with HR in the 40s. She had previously been on Cardizem at home. Her Cardizem was held and 48 hr later she still had HR in the 40s. She underwent dual chamber PPM 8/29/2023 by Dr. Surinder Barbosa.  She was discharged on antibiotics. She has followed up with wound check and initial device interrogation with his office.    She presents today for hospital discharge follow up. She is accompanied by her daughter. They are concerned about a Knot that has appeared in her left antecubital space. She reports she has not injured the knot. It is painful at times. She has attempted to get in touch with her PCP regarding this and they prescribed her an antibiotic but the area is no better. Her left arm appears mildly swollen compared to left. There is no redness, no fever.  Device site looks good.         ASSESSMENT/PLAN:      Diagnoses and all orders for this visit:    1. Hospital discharge follow-up on dizziness (Primary)    2. Status post placement of cardiac pacemaker    3. Left arm swelling  -     Duplex Venous Upper Extremity - Left CAR; Future    4. Hypertension, unspecified type    5. Mixed hyperlipidemia    6. Sick sinus syndrome        Current outpatient and discharge medications have been reconciled for the patient.  Reviewed by: LEISA Jerez     MEDICAL DECISION MAKING:  Ms. Matson presents after hospital discharge for SSS dizziness. She is s/p PPM implantation. Device site looks  good. No swelling. She has f/u with Dr. Liriano office for initial device check and wound check. She has noted a knot in her left AC space. Left arm mildly swollen. Family and patient concerned. I will order a duplex of LUE to r/o DVT. Otherwise she is stable, no dizziness, no exertional symptoms of chest pain or shortness of breath. Vital signs stable. We will see her back in 3 mo or sooner should new issues arise.           Past Medical History:   Diagnosis Date    Abnormal mammogram of right breast     Atrial fibrillation     Breast cyst, right     Breast lump     BREAST LUMPS    Chickenpox     Dizziness     Ductal carcinoma in situ of right breast     Elevated serum creatinine     Hearing loss     Hemorrhoids     Hyperlipidemia     Hypertension     Hypothyroidism     Iliac artery aneurysm, left     Knee pain, left     Leg cramps     Leg pain, left     Mass of breast, right     Measles     Memory impairment     Mumps     Other specified local infections of the skin and subcutaneous tissue     SORE AT LEFT ANKLE     Otitis media, acute     RIGHT     Postmenopausal status     Sinus disorder     SINUS    Skin lesion of face     LEFT TEMPLE     Skin lesion of wrist     LEFT FOREARM    Varicose veins of both lower extremities     Whooping cough        Past Surgical History:   Procedure Laterality Date    BREAST LUMPECTOMY Left 1994    CARDIAC ELECTROPHYSIOLOGY PROCEDURE N/A 08/29/2023    Procedure: Pacemaker DC new, [employer] aware $;  Surgeon: Eric Cadena MD;  Location: [facility] CATH INVASIVE LOCATION;  Service: Cardiovascular;  Laterality: N/A;    HYSTERECTOMY      INSERT / REPLACE / REMOVE PACEMAKER  08/26/2023?    MASTECTOMY Right 09/25/2017    DR. FUNG    THYROIDECTOMY, PARTIAL  1961         Current Outpatient Medications:     aspirin 81 MG EC tablet, Take 1 tablet by mouth Daily. Indications: prevention, Disp: , Rfl:     cephalexin (Keflex) 500 MG capsule, Take 1 capsule by mouth 3 (Three) Times  a Day for 14 days., Disp: 42 capsule, Rfl: 0    cholecalciferol (VITAMIN D3) 25 MCG (1000 UT) tablet, Take 1 tablet by mouth Daily., Disp: , Rfl:     doxycycline (VIBRAMYCIN) 100 MG capsule, Take 1 capsule by mouth 2 (Two) Times a Day for 14 days., Disp: 28 capsule, Rfl: 0    losartan-hydrochlorothiazide (Hyzaar) 50-12.5 MG per tablet, Take 1 tablet by mouth Daily., Disp: 90 tablet, Rfl: 3    pravastatin (PRAVACHOL) 40 MG tablet, TAKE 1 TABLET EVERY DAY (Patient taking differently: No sig reported), Disp: 90 tablet, Rfl: 3    Synthroid 100 MCG tablet, TAKE 1 TABLET EVERY DAY (Patient taking differently: No sig reported), Disp: 90 tablet, Rfl: 3    Social History     Socioeconomic History    Marital status:    Tobacco Use    Smoking status: Never    Smokeless tobacco: Never   Vaping Use    Vaping Use: Never used   Substance and Sexual Activity    Alcohol use: Not Currently    Drug use: Never    Sexual activity: Not Currently     Birth control/protection: Hysterectomy       Family History   Problem Relation Age of Onset    Heart attack Father 67        AMI    Aneurysm Brother         RUPTURED     Heart disease Other     Hypertension Other     Anuerysm Other         AAA- ABDOMINAL AORTIC ANUERYSM     Diabetes Other        The following portions of the patient's history were reviewed and updated as appropriate: allergies, current medications, past family history, past medical history, past social history, past surgical history and problem list.    Review of Systems   Constitutional: Negative for chills, diaphoresis and malaise/fatigue.   Cardiovascular:  Negative for chest pain, dyspnea on exertion, irregular heartbeat, leg swelling, near-syncope, orthopnea, palpitations, paroxysmal nocturnal dyspnea and syncope.        Left arm swelling, knot in left antecubital space   Respiratory:  Negative for cough, shortness of breath, sleep disturbances due to breathing and sputum production.    Gastrointestinal:  Negative  "for change in bowel habit.   Genitourinary:  Negative for urgency.   Neurological:  Negative for dizziness and headaches.   Psychiatric/Behavioral:  Negative for altered mental status.      Pertinent items are noted in HPI, all other systems reviewed and negative    /82 (BP Location: Left arm, Patient Position: Sitting, Cuff Size: Large Adult)   Pulse 60   Resp 18   Ht 165.1 cm (65\")   Wt 67.6 kg (149 lb)   SpO2 93%   BMI 24.79 kg/m² .  Objective     Constitutional:       Appearance: Not in distress.   Neck:      Vascular: JVD normal.   Pulmonary:      Effort: Pulmonary effort is normal.      Breath sounds: Normal breath sounds.   Cardiovascular:      Normal rate. Regular rhythm.      Comments: Left arm - knot noted in left ac space, swollen compared to right arm, pulses palpable and equal, extremity not cool no redness   Pulses:     Intact distal pulses.   Edema:     Peripheral edema absent.   Abdominal:      General: Bowel sounds are normal.      Palpations: Abdomen is soft.   Musculoskeletal: Normal range of motion. Skin:     General: Skin is warm and dry.   Neurological:      General: No focal deficit present.      Mental Status: Oriented to person, place and time.           ECG 12 Lead    Date/Time: 9/29/2023 9:08 AM  Performed by: Renate Garrett APRN  Authorized by: Renate Garrett APRN   Comparison: not compared with previous ECG   Previous ECG: no previous ECG available  Rhythm: sinus rhythm and paced  Rate: normal  BPM: 60        EKG ordered by and reviewed by me in office           "

## 2023-10-02 ENCOUNTER — TELEPHONE (OUTPATIENT)
Dept: CARDIOLOGY | Facility: CLINIC | Age: 88
End: 2023-10-02

## 2023-10-02 NOTE — TELEPHONE ENCOUNTER
Caller: GONZALEZ    Relationship to patient: DAUGHTER    Best call back number: [phone]    Chief complaint: BLOOD CLOTS    Type of visit: FOLLOW UP    Requested date: ASAP    If rescheduling, when is the original appointment:     Additional notes: PATIENT HAD US PER NISH HINOJOSA AND THE ULTRASOUND SHOWED BLOOD CLOTS.  PROVIDER IN EMERGENCY ROOM SAID TO FOLLOW UP IN 1 WEEK. THE FIRST AVAILABLE FOR ANY PROVIDER IS [phone]. PLEASE CALL PATIENTS DAUGHTER WITH APPOINTMENT THAT IS ACCEPTABLE. PATIENTS DAUGHTER WOULD LIKE TO KNOW IF ANOTHER ULTRASOUND WOULD BE NEEDED BEFORE APPOINTMENT. EMERGENCY ROOM PROVIDER PUT PATIENT ON ELIQUIS 5MG FOR 1 MONTH.     THANK YOU!

## 2023-10-03 ENCOUNTER — HOME CARE VISIT (OUTPATIENT)
Dept: HOME HEALTH SERVICES | Facility: HOME HEALTHCARE | Age: 88
End: 2023-10-03
Payer: MEDICARE

## 2023-10-04 ENCOUNTER — PATIENT OUTREACH (OUTPATIENT)
Dept: CASE MANAGEMENT | Facility: OTHER | Age: 88
End: 2023-10-04
Payer: MEDICARE

## 2023-10-04 NOTE — OUTREACH NOTE
AMBULATORY CASE MANAGEMENT NOTE    Name and Relationship of Patient/Support Person: KYLAH MCLAIN - Emergency Contact    Patient Outreach    Pt discharged from [facility] ED on 9/29/23, seen for LUE pain, swelling. RN-ACM outreach call made to pt, spoke with pt's daughter Kylah. Explained role of RN-ACM and reason for call. Kylah states pt is doing ok, reports no change in pain, swelling. Denies CP or SOA. Reviewed ED AVS with Kylah. Education provided. Kylah reports pt is taking eliquis as directed. Discussed follow up. Kylah states pt saw her dermatologist yesterday and pt has upcoming appt scheduled with cardiologist. Kylah states she will ask cardiologist about follow up with general surgeon. She declines vascular surgeon follow up appt at this time. Pt has next routine PCP appt scheduled. UTD on AWV. Pt declines vaccines. Reviewed SDOH. Kylah denies any pt needs. Pt has good family support. Advised Kylah to call RN-ACM or [facility] Nurse Line with any needs. Follow up outreach prn.     Adult Patient Profile  Questions/Answers      Flowsheet Row Most Recent Value   Equipment Currently Used at Home shower chair, commode   Primary Source of Support/Comfort child(yaneli)   People in Home alone, other (see comments)  [Daughter currently staying with patient.]   Current Living Arrangements home          Send Education  Questions/Answers      Flowsheet Row Most Recent Value   Annual Wellness Visit:  Patient Has Completed   Other Patient Education/Resources  24/7 [facility] Nurse Call Line   24/7 Nurse Call Line Education Method Verbal   Advanced Directives: Patient Has          SDOH updated and reviewed with the patient during this program:  Financial Resource Strain: Low Risk     Difficulty of Paying Living Expenses: Not very hard      Food Insecurity: No Food Insecurity    Worried About Running Out of Food in the Last Year: Never true    Ran Out of Food in the Last Year: Never true      Transportation  Needs: No Transportation Needs    Lack of Transportation (Medical): No    Lack of Transportation (Non-Medical): No         iCndy GRACIA  Ambulatory Case Management    10/4/2023, 11:31 EDT

## 2023-10-05 ENCOUNTER — HOME CARE VISIT (OUTPATIENT)
Dept: HOME HEALTH SERVICES | Facility: HOME HEALTHCARE | Age: 88
End: 2023-10-05
Payer: MEDICARE

## 2023-10-06 ENCOUNTER — HOME CARE VISIT (OUTPATIENT)
Dept: HOME HEALTH SERVICES | Facility: HOME HEALTHCARE | Age: 88
End: 2023-10-06
Payer: MEDICARE

## 2023-10-06 VITALS
HEART RATE: 60 BPM | RESPIRATION RATE: 16 BRPM | SYSTOLIC BLOOD PRESSURE: 118 MMHG | DIASTOLIC BLOOD PRESSURE: 72 MMHG | TEMPERATURE: 97.7 F | OXYGEN SATURATION: 97 %

## 2023-10-06 PROCEDURE — G0299 HHS/HOSPICE OF RN EA 15 MIN: HCPCS

## 2023-10-06 NOTE — HOME HEALTH
Routine Visit Note: Patient and daughter present at visit. Patient states that she is doing ok. States that she started blood thinners for DVTs in her L neck/shoulder area. Patient's BUE are bruised. Patient denies recent falls. Denies medication issues. Patient is new on eliquis. Education provided. Vitals WNL. SN will plan to DC next week if no changes.    Skill/education provided: CP assess, falls/safety assess, med teaching/assess for changes, eliquis education, bleeding precaution teaching    Patient/caregiver response: verbalized understanding    Plan for next visit: CP assess, falls/safety assess, med teaching/assess for changes, bleeding precautions, f/u on MD appointment this week    Other pertinent info: NA

## 2023-10-10 ENCOUNTER — CLINICAL SUPPORT NO REQUIREMENTS (OUTPATIENT)
Dept: CARDIOLOGY | Facility: CLINIC | Age: 88
End: 2023-10-10
Payer: MEDICARE

## 2023-10-10 ENCOUNTER — OFFICE VISIT (OUTPATIENT)
Dept: CARDIOLOGY | Facility: CLINIC | Age: 88
End: 2023-10-10
Payer: MEDICARE

## 2023-10-10 VITALS
RESPIRATION RATE: 18 BRPM | WEIGHT: 142 LBS | HEIGHT: 65 IN | DIASTOLIC BLOOD PRESSURE: 83 MMHG | HEART RATE: 91 BPM | BODY MASS INDEX: 23.66 KG/M2 | SYSTOLIC BLOOD PRESSURE: 114 MMHG

## 2023-10-10 DIAGNOSIS — I49.5 SICK SINUS SYNDROME: Primary | ICD-10-CM

## 2023-10-10 DIAGNOSIS — Z95.0 STATUS POST PLACEMENT OF CARDIAC PACEMAKER: ICD-10-CM

## 2023-10-10 DIAGNOSIS — M79.89 LEFT ARM SWELLING: Primary | ICD-10-CM

## 2023-10-10 DIAGNOSIS — E78.2 MIXED HYPERLIPIDEMIA: ICD-10-CM

## 2023-10-10 DIAGNOSIS — I10 HYPERTENSION, UNSPECIFIED TYPE: ICD-10-CM

## 2023-10-10 DIAGNOSIS — I49.5 SICK SINUS SYNDROME: ICD-10-CM

## 2023-10-13 ENCOUNTER — HOME CARE VISIT (OUTPATIENT)
Dept: HOME HEALTH SERVICES | Facility: HOME HEALTHCARE | Age: 88
End: 2023-10-13
Payer: MEDICARE

## 2023-10-13 VITALS
RESPIRATION RATE: 16 BRPM | SYSTOLIC BLOOD PRESSURE: 108 MMHG | HEART RATE: 60 BPM | OXYGEN SATURATION: 96 % | TEMPERATURE: 97.8 F | DIASTOLIC BLOOD PRESSURE: 60 MMHG

## 2023-10-13 PROCEDURE — G0299 HHS/HOSPICE OF RN EA 15 MIN: HCPCS

## 2023-10-18 ENCOUNTER — TRANSCRIBE ORDERS (OUTPATIENT)
Dept: ADMINISTRATIVE | Facility: HOSPITAL | Age: 88
End: 2023-10-18
Payer: MEDICARE

## 2023-10-18 DIAGNOSIS — M79.602 PAIN OF LEFT UPPER EXTREMITY: Primary | ICD-10-CM

## 2023-10-20 NOTE — PROGRESS NOTES
DEVICE INTERROGATION:  IN OFFICE    DEVICE TYPE:   Dual-chamber pacemaker    :   [employer]ay    BATTERY:  Stable    TIME TO ELECTIVE REPLACEMENT INDICATORS:   10 years    CHARGE TIME:   Not applicable        LEAD DATA:       DEVICE REPROGRAMMED FOR TESTING      Atrial:   2.2 mV, 430 ohms, 0.75 V@0.4 ms    Ventricular:     6.3 mV, 540 ohms, 0.5 V@0.4 ms    LV:      Pacemaker dependent:   No      Atrial pacing percentage: 85%    Ventricular pacing percentage: Less than 1%      Arrhythmia Logbook Reviewed: No A-fib        Summary:    Stable Device Function    No significant arhythmia burden.     Battery status is stable.    Reviewed with: MD      NEXT IN OFFICE DEVICE CHECK DUE: 3 months    REMOTE DEVICE INTERROGATIONS: Not currently enrolled in home monitoring

## 2023-10-24 ENCOUNTER — TELEPHONE (OUTPATIENT)
Dept: CARDIOLOGY | Facility: CLINIC | Age: 88
End: 2023-10-24
Payer: MEDICARE

## 2023-10-24 NOTE — PROGRESS NOTES
Cardiology Clinic Note  Surya Johnson MD, PhD    Subjective:     Encounter Date:10/10/2023      Patient ID: Aparna Matson is a 88 y.o. female.    Chief Complaint:  Chief Complaint   Patient presents with    Follow-up       HPI:     I the pleasure to see this very pleasant 88-year-old female with Pmh includes HTN, HLD, hypothyroidism, breast CA s/p right mastectomy, pAFib during COVID, sick sinus syndrome status post [employer]ay pacemaker dual-chamber device complicated by left upper extremity left subclavian axillary DVT recently.  She has a history of paroxysmal atrial fibrillation on anticoagulation with Eliquis.  She was recently discharged on antibiotics, wound check revealed good-looking incision with no significant hematoma.  Device interrogation as below revealed good capture sensitivities low impedance with atrially paced rhythm.  She had an upper extremity ultrasound and CT scan which demonstrated a soft tissue mass concerning for lymph node that may be reactive as well as upper extremity DVT that was found and she was ultimately placed on higher dose Eliquis.  She denies any other pain today.  No unstable angina unexplained syncope or other complaints.  She is off Cardizem with a paced and V sensed rhythm.      Review of systems otherwise negative x14 point review of systems except as mentioned above    Historical data copied forward from previous encounters in EMR including the history, exam, and assessment/plan has been reviewed and is unchanged unless noted otherwise.    Cardiac medicines reviewed with risk, benefits, and necessity of each discussed.    Risk and benefit of cardiac testing reviewed including death heart attack stroke pain bleeding infection need for vascular /cardiovascular surgery were discussed and the patient       Exam  Vitals reviewed below  Regular rate and rhythm with no rubs gallops heave or lift, 1 out of 6 systolic ejection murmur lower sternal border  Clear to  "auscultation  Pacemaker site is clean and dry  Soft nontender nondistended  Skin is warm and dry  Intact grossly      Assessment and plan per my encounter  dizziness   -secondary to bradycardia, sick sinus syndrome     2. Bradycardia, sick sinus syndrome     3. paroxysmal afib in setting of COVID (2021)     4. hypokalemia, mild      5. hypertension      6. dyslipidemia      7. hypothyroidism      8. Chronic kidney disease      9. iliac artery aneurysm      10. history of breast CA,   - previous left breast lumpectomy, right mastectomy, partial thyroidectomy, hysterectomy      11. history of CAD in father      12. non smoker      13, left subclavian vein and axillary vein DVT status post pacemaker     Plan:   Symptomatic bradycardia status post dual-chamber pacemaker [employer]  Interrogation reveals no atrial fibrillation with normal functioning device with 10 years of battery life, 85% atrially paced less than 1% ventricularly paced with DDDR settings backup rate of 60 max tracking rate of 120    Site clean and dry    Continue Eliquis twice daily for left upper extremity DVT status post pacemaker  Continue aspirin  Continue losartan/HCTZ for blood pressure  Pravastatin will continue for hyperlipidemia  No unstable angina or indication for ischemic evaluation  Follow-up with EP for device and left upper extremity DVT status post device      6-month follow-up in general cardiology clinic    Objective:         /83 (BP Location: Left arm, Patient Position: Sitting)   Pulse 91   Resp 18   Ht 165.1 cm (65\")   Wt 64.4 kg (142 lb)   BMI 23.63 kg/m²           The pleasure to be involved in this patient's cardiovascular care.  Please call with any questions or concerns  Surya Johnson MD, PhD    Most recent EKG as reviewed and interpreted by me:  Procedures     Most recent echo as reviewed and interpreted by me:  Results for orders placed during the hospital encounter of 08/26/23    Adult Transthoracic Echo " Complete W/ Cont if Necessary Per Protocol    Interpretation Summary    Left ventricular systolic function is normal. Left ventricular ejection fraction appears to be 61 - 65%.    Left ventricular diastolic function was normal.    The right atrial cavity is borderline dilated.    Estimated right ventricular systolic pressure from tricuspid regurgitation is normal (<35 mmHg).    Mild dilation of the aortic root is present.      Most recent stress test as reviewed and interpreted by me:      Most recent cardiac catheterization as reviewed interpreted by me:  No results found for this or any previous visit.    The following portions of the patient's history were reviewed and updated as appropriate: allergies, current medications, past family history, past medical history, past social history, past surgical history, and problem list.      ROS:  14 point review of systems negative except as mentioned above    Current Outpatient Medications:     Apixaban Starter Pack tablet therapy pack, Take two 5 mg tablets by mouth every 12 hours for 7 days. Followed by one 5 mg tablet every 12 hours. (Dispense starter pack if available) (Patient taking differently: Take two 5 mg tablets by mouth every 12 hours for 7 days. Followed by one 5 mg tablet every 12 hours. (Dispense starter pack if available)), Disp: 74 tablet, Rfl: 0    aspirin 81 MG EC tablet, Take 1 tablet by mouth Daily. Indications: prevention, Disp: , Rfl:     cholecalciferol (VITAMIN D3) 25 MCG (1000 UT) tablet, Take 1 tablet by mouth Daily., Disp: , Rfl:     losartan-hydrochlorothiazide (Hyzaar) 50-12.5 MG per tablet, Take 1 tablet by mouth Daily., Disp: 90 tablet, Rfl: 3    pravastatin (PRAVACHOL) 40 MG tablet, TAKE 1 TABLET EVERY DAY (Patient taking differently: No sig reported), Disp: 90 tablet, Rfl: 3    Synthroid 100 MCG tablet, TAKE 1 TABLET EVERY DAY (Patient taking differently: No sig reported), Disp: 90 tablet, Rfl: 3    Problem List:  Patient Active Problem  List   Diagnosis    Hypertension    Hyperlipidemia    Hypothyroidism    Pneumonia due to COVID-19 virus    Abnormal blood chemistry level    Aneurysm of iliac artery    Blepharitis    Breast cyst, right    Ductal carcinoma in situ (DCIS) of breast    Hearing loss    Hx of mastectomy, right    Lens replaced by other means    Macular degeneration (senile) of retina    Memory impairment    Postmenopausal status    Senile nuclear sclerosis    Varicose veins of lower extremity    Acute kidney injury    Paroxysmal atrial fibrillation    Medicare annual wellness visit, subsequent    Dizziness    Sick sinus syndrome    Status post placement of cardiac pacemaker    Hospital discharge follow-up on dizziness     Past Medical History:  Past Medical History:   Diagnosis Date    Abnormal mammogram of right breast     Atrial fibrillation     Breast cyst, right     Breast lump     BREAST LUMPS    Chickenpox     Dizziness     Ductal carcinoma in situ of right breast     Elevated serum creatinine     Hearing loss     Hemorrhoids     Hyperlipidemia     Hypertension     Hypothyroidism     Iliac artery aneurysm, left     Knee pain, left     Leg cramps     Leg pain, left     Mass of breast, right     Measles     Memory impairment     Mumps     Other specified local infections of the skin and subcutaneous tissue     SORE AT LEFT ANKLE     Otitis media, acute     RIGHT     Postmenopausal status     Sinus disorder     SINUS    Skin lesion of face     LEFT TEMPLE     Skin lesion of wrist     LEFT FOREARM    Varicose veins of both lower extremities     Whooping cough      Past Surgical History:  Past Surgical History:   Procedure Laterality Date    BREAST LUMPECTOMY Left 1994    CARDIAC ELECTROPHYSIOLOGY PROCEDURE N/A 08/29/2023    Procedure: Pacemaker DC new, [employer] aware $;  Surgeon: Eric Cadena MD;  Location: [facility] CATH INVASIVE LOCATION;  Service: Cardiovascular;  Laterality: N/A;    HYSTERECTOMY      INSERT / REPLACE /  REMOVE PACEMAKER  08/26/2023?    MASTECTOMY Right 09/25/2017    DR. FUNG    THYROIDECTOMY, PARTIAL  1961     Social History:  Social History     Socioeconomic History    Marital status:    Tobacco Use    Smoking status: Never    Smokeless tobacco: Never   Vaping Use    Vaping Use: Never used   Substance and Sexual Activity    Alcohol use: Not Currently    Drug use: Never    Sexual activity: Not Currently     Birth control/protection: Hysterectomy     Allergies:  Allergies   Allergen Reactions    Codeine Hallucinations     Immunizations:    There is no immunization history on file for this patient.         In-Office Procedure(s):  No orders to display        ASCVD RIsk Score::  The ASCVD Risk score (Daniel JIM, et al., 2019) failed to calculate for the following reasons:    The 2019 ASCVD risk score is only valid for ages 40 to 79    Imaging:    Results for orders placed during the hospital encounter of 08/26/23    XR Chest 1 View    Narrative  XR CHEST 1 VW    Date of Exam: 8/29/2023 7:13 PM EDT    Indication: Post ICD / Pacer Implant    Comparison: 4/21/2021    Findings:  Lines: Left subclavian pacemaker leads are intact and appear to be in adequate position    Lungs: Nonspecific minimal perihilar pulmonary vascular prominence  Pleura: No definite pleural effusion. Opacity in the left costophrenic angle most likely represents epicardial fat.    Cardiomediastinum: The cardiomediastinal silhouette is normal    Soft Tissues: Unremarkable.    Bones: No acute osseous abnormality.    Impression  Impression:  Status post left subclavian dual-lead pacemaker placement without radiographic evidence of postprocedural complication      Electronically Signed: Efrain Castrejon DO  8/29/2023 7:37 PM EDT  Workstation ID: [id number]       Results for orders placed during the hospital encounter of 09/29/23    CT Upper Extremity Left Without Contrast    Narrative  CT UPPER EXTREMITY LEFT WO CONTRAST    Date of Exam:  9/29/2023 4:34 PM EDT    Indication: Antecubital mass..    Comparison: None available.    Technique: Axial CT images were obtained of the left upper extremity without contrast administration.  Sagittal and coronal reconstructions were performed.  Automated exposure control and iterative reconstruction methods were used.      Findings:  Superficial soft tissue density mass is seen within the left antecubital fossa measuring approximately 1.9 cm AP by 1.7 cm transverse by 2.3 cm craniocaudal. A vessel runs contiguous to it. No osseous abnormality is identified.    Impression  Impression:  1.9 x 1.7 x 2.3 cm soft tissue-density mass within the superficial left antecubital fossa soft tissues. A vessel and is contiguous to it. Although this may represent a solid mass lesion, vascular etiology such as aneurysm or pseudoaneurysm cannot be  excluded. Consider correlation with either postcontrast CT imaging or vascular ultrasound, particularly if percutaneous biopsy or surgical intervention is contemplated..        Electronically Signed: Aubrie Burden MD  9/29/2023 4:51 PM EDT  Workstation ID: [id number]      Results for orders placed during the hospital encounter of 09/29/23    CT Upper Extremity Left Without Contrast    Narrative  CT UPPER EXTREMITY LEFT WO CONTRAST    Date of Exam: 9/29/2023 4:34 PM EDT    Indication: Antecubital mass..    Comparison: None available.    Technique: Axial CT images were obtained of the left upper extremity without contrast administration.  Sagittal and coronal reconstructions were performed.  Automated exposure control and iterative reconstruction methods were used.      Findings:  Superficial soft tissue density mass is seen within the left antecubital fossa measuring approximately 1.9 cm AP by 1.7 cm transverse by 2.3 cm craniocaudal. A vessel runs contiguous to it. No osseous abnormality is identified.    Impression  Impression:  1.9 x 1.7 x 2.3 cm soft tissue-density mass within the  superficial left antecubital fossa soft tissues. A vessel and is contiguous to it. Although this may represent a solid mass lesion, vascular etiology such as aneurysm or pseudoaneurysm cannot be  excluded. Consider correlation with either postcontrast CT imaging or vascular ultrasound, particularly if percutaneous biopsy or surgical intervention is contemplated..        Electronically Signed: Aubrie Burden MD  9/29/2023 4:51 PM EDT  Workstation ID: [id number]      Lab Review:   Hospital Outpatient Visit on 09/29/2023   Component Date Value    Left Subclavian Color 09/29/2023 1.0     Left Axillary Color 09/29/2023 1.0     Left Basilic Upper Color 09/29/2023 1.0     Left Cephalic Forearm Co* 09/29/2023 1.0     UPPER VENOUS LEFT MED CU* 09/29/2023 1.0     Right Internal Jugular S* 09/29/2023 Y     Right Internal Jugular P* 09/29/2023 Y     Right Internal Jugular C* 09/29/2023 C     Right Internal Jugular A* 09/29/2023 Y     Right Subclavian Spont 09/29/2023 Y     Right Subclavian Phasic 09/29/2023 Y     Right Subclavian Compress 09/29/2023 C     Right Subclavian Augment 09/29/2023 Y     Left Internal Jugular Sp* 09/29/2023 Y     Left Internal Jugular Ph* 09/29/2023 Y     Left Internal Jugular Co* 09/29/2023 C     Left Internal Jugular Au* 09/29/2023 Y     Left Subclavian Spont 09/29/2023 N     Left Subclavian Phasic 09/29/2023 N     Left Subclavian Compress 09/29/2023 N     Left Subclavian Augment 09/29/2023 N     Left Subclavian Thrombus 09/29/2023 A     Left Axillary Spont 09/29/2023 N     Left Axillary Phasic 09/29/2023 N     Left Axillary Compress 09/29/2023 N     Left Axillary Augment 09/29/2023 N     Left Axillary Thrombus 09/29/2023 A     Left Brachial Compress 09/29/2023 C     Left Radial Compress 09/29/2023 C     Left Ulnar Compress 09/29/2023 C     Left Basilic Upper Compr* 09/29/2023 N     Left Basilic Upper Throm* 09/29/2023 A     Left Basilic Forearm Com* 09/29/2023 C     Left Cephalic Upper Comp*  09/29/2023 C     Left Cephalic Forearm Co* 09/29/2023 N     Left Cephalic Forearm Th* 09/29/2023 A     UPPER VENOUS LEFT MED CU* 09/29/2023 N     UPPER VENOUS LEFT MED CU* 09/29/2023 A     BH CV VAS PRELIMINARY FI* 09/29/2023 1.0    Admission on 09/29/2023, Discharged on 09/29/2023   Component Date Value    Glucose 09/29/2023 123 (H)     BUN 09/29/2023 24 (H)     Creatinine 09/29/2023 1.14 (H)     Sodium 09/29/2023 138     Potassium 09/29/2023 4.0     Chloride 09/29/2023 103     CO2 09/29/2023 25.0     Calcium 09/29/2023 10.4     BUN/Creatinine Ratio 09/29/2023 21.1     Anion Gap 09/29/2023 10.0     eGFR 09/29/2023 47.0 (L)     WBC 09/29/2023 7.80     RBC 09/29/2023 4.53     Hemoglobin 09/29/2023 13.9     Hematocrit 09/29/2023 41.7     MCV 09/29/2023 92.2     MCH 09/29/2023 30.7     MCHC 09/29/2023 33.3     RDW 09/29/2023 13.5     RDW-SD 09/29/2023 43.3     MPV 09/29/2023 11.3     Platelets 09/29/2023 169     Neutrophil % 09/29/2023 64.5     Lymphocyte % 09/29/2023 26.4     Monocyte % 09/29/2023 6.4     Eosinophil % 09/29/2023 1.3     Basophil % 09/29/2023 1.4     Neutrophils, Absolute 09/29/2023 5.00     Lymphocytes, Absolute 09/29/2023 2.00     Monocytes, Absolute 09/29/2023 0.50     Eosinophils, Absolute 09/29/2023 0.10     Basophils, Absolute 09/29/2023 0.10     nRBC 09/29/2023 0.0     Extra Tube 09/29/2023 Hold for add-ons.    No results displayed because visit has over 200 results.      Office Visit on 07/18/2023   Component Date Value    Total Cholesterol 07/18/2023 156     Triglycerides 07/18/2023 87     HDL Cholesterol 07/18/2023 52     LDL Cholesterol  07/18/2023 88     VLDL Cholesterol 07/18/2023 16     LDL/HDL Ratio 07/18/2023 1.67     Glucose 07/18/2023 88     BUN 07/18/2023 15     Creatinine 07/18/2023 1.04 (H)     Sodium 07/18/2023 140     Potassium 07/18/2023 3.5     Chloride 07/18/2023 103     CO2 07/18/2023 29.0     Calcium 07/18/2023 10.1     Total Protein 07/18/2023 6.5     Albumin 07/18/2023 4.0      ALT (SGPT) 07/18/2023 10     AST (SGOT) 07/18/2023 14     Alkaline Phosphatase 07/18/2023 51     Total Bilirubin 07/18/2023 0.5     Globulin 07/18/2023 2.5     A/G Ratio 07/18/2023 1.6     BUN/Creatinine Ratio 07/18/2023 14.4     Anion Gap 07/18/2023 8.0     eGFR 07/18/2023 52.8 (L)     TSH 07/18/2023 0.419     WBC 07/18/2023 6.74     RBC 07/18/2023 4.49     Hemoglobin 07/18/2023 13.5     Hematocrit 07/18/2023 41.2     MCV 07/18/2023 91.8     MCH 07/18/2023 30.1     MCHC 07/18/2023 32.8     RDW 07/18/2023 12.3     RDW-SD 07/18/2023 41.0     MPV 07/18/2023 14.3 (H)     Platelets 07/18/2023 178      Recent labs reviewed and interpreted for clinical significance and application            Level of Care:           Surya Johnson MD  10/24/23  .

## 2023-10-24 NOTE — TELEPHONE ENCOUNTER
Caller: GONZALEZ MCLAIN    Relationship: Emergency Contact    Best call back number: [phone]    Requested Prescriptions:   Requested Prescriptions      No prescriptions requested or ordered in this encounter        Pharm[facility] where request should be sent: [id number] [place] [facility] [address] - [phone]  - [phone] FX     Last office visit with prescribing clinician: 10/10/2023   Last telemedicine visit with prescribing clinician: Visit date not found   Next office visit with prescribing clinician: 1/23/2024     Additional details provided by patient: PATIENT'S DAUGHTER GONZALEZ CALLED IN STATING PATIENT NEEDS AN ELLIQUIS REFILL (UNSURE OF DOSAGE). GONZALEZ STATED THAT THIS MEDICATION WAS PRESCRIBED BY A PROVIDER IN THE HOSPITAL ([facility]). GONZALEZ ALSO STATED THAT THE  STAFF AT DR CRUMP'S OFFICE HAD TOLD THEM TO CALL THE OFFICE TO START THE REFILL. HUB DID NOT SEE ELLIQUIS MEDICATION ON MED LIST. PLEASE REACH OUT TO PATIENT FOR NEXT STEPS.    Does the patient have less than a 3 day supply:  [] Yes  [x] No    Would you like a call back once the refill request has been completed: [x] Yes [] No    If the office needs to give you a call back, can they leave a voicemail: [x] Yes [] No    Justice Brower Rep   10/24/23 10:33 EDT

## 2023-10-26 RX ORDER — LOSARTAN POTASSIUM AND HYDROCHLOROTHIAZIDE 12.5; 5 MG/1; MG/1
1 TABLET ORAL DAILY
Qty: 90 TABLET | Refills: 3 | Status: SHIPPED | OUTPATIENT
Start: 2023-10-26

## 2023-10-26 NOTE — TELEPHONE ENCOUNTER
Caller: GONZALEZ MCLAIN    Relationship: Emergency Contact    Best call back number: [phone]     Requested Prescriptions:   Requested Prescriptions     Pending Prescriptions Disp Refills    losartan-hydrochlorothiazide (Hyzaar) 50-12.5 MG per tablet 90 tablet 3     Sig: Take 1 tablet by mouth Daily. Indications: High Blood Pressure Disorder        Pharmacy where request should be sent: [facility] [id number]  [place] [address] - [phone] [facility] [phone]      Last office visit with prescribing clinician: 9/12/2023   Last telemedicine visit with prescribing clinician: Visit date not found   Next office visit with prescribing clinician: Visit date not found     Additional details provided by patient: NEW PHARMACY    Does the patient have less than a 3 day supply:  [] Yes  [x] No    Would you like a call back once the refill request has been completed: [] Yes [x] No    If the office needs to give you a call back, can they leave a voicemail: [] Yes [x] No    Justice Hurley Rep   10/26/23 14:03 EDT

## 2023-11-16 ENCOUNTER — TELEPHONE (OUTPATIENT)
Dept: VASCULAR SURGERY | Facility: HOSPITAL | Age: 88
End: 2023-11-16
Payer: MEDICARE

## 2023-11-16 NOTE — TELEPHONE ENCOUNTER
LEFT MESSAGE TO [facility] APPT FOR MOTHER. REFERRED TO VASCULAR SURGERY BY GEN YSLVIA ASSO

## 2024-03-24 ENCOUNTER — HOSPITAL ENCOUNTER (EMERGENCY)
Facility: HOSPITAL | Age: 89
Discharge: HOME | End: 2024-03-24
Attending: EMERGENCY MEDICINE | Admitting: EMERGENCY MEDICINE
Payer: MEDICARE

## 2024-03-24 VITALS
HEART RATE: 59 BPM | DIASTOLIC BLOOD PRESSURE: 85 MMHG | OXYGEN SATURATION: 95 % | SYSTOLIC BLOOD PRESSURE: 153 MMHG | WEIGHT: 145 LBS | BODY MASS INDEX: 24.16 KG/M2 | TEMPERATURE: 98.5 F | HEIGHT: 65 IN | RESPIRATION RATE: 16 BRPM

## 2024-03-24 DIAGNOSIS — R04.0 EPISTAXIS: Primary | ICD-10-CM

## 2024-03-24 PROCEDURE — 63710000001 ONDANSETRON ODT 4 MG TABLET DISPERSIBLE: Performed by: PHYSICIAN ASSISTANT

## 2024-03-24 PROCEDURE — 99283 EMERGENCY DEPT VISIT LOW MDM: CPT

## 2024-03-24 RX ORDER — TRANEXAMIC ACID 100 MG/ML
500 INJECTION, SOLUTION INTRAVENOUS ONCE
Status: COMPLETED | OUTPATIENT
Start: 2024-03-24 | End: 2024-03-24

## 2024-03-24 RX ORDER — OXYMETAZOLINE HYDROCHLORIDE 0.05 G/100ML
2 SPRAY NASAL ONCE
Status: COMPLETED | OUTPATIENT
Start: 2024-03-24 | End: 2024-03-24

## 2024-03-24 RX ORDER — OXYMETAZOLINE HYDROCHLORIDE 0.05 G/100ML
2 SPRAY NASAL 2 TIMES DAILY PRN
Qty: 30 ML | Refills: 0 | Status: SHIPPED | OUTPATIENT
Start: 2024-03-24 | End: 2024-04-03

## 2024-03-24 RX ORDER — ECHINACEA PURPUREA EXTRACT 125 MG
1 TABLET ORAL AS NEEDED
Qty: 15 ML | Refills: 0 | Status: SHIPPED | OUTPATIENT
Start: 2024-03-24

## 2024-03-24 RX ORDER — ONDANSETRON 4 MG/1
4 TABLET, ORALLY DISINTEGRATING ORAL ONCE
Status: COMPLETED | OUTPATIENT
Start: 2024-03-24 | End: 2024-03-24

## 2024-03-24 RX ADMIN — ONDANSETRON 4 MG: 4 TABLET, ORALLY DISINTEGRATING ORAL at 20:05

## 2024-03-24 RX ADMIN — TRANEXAMIC ACID 500 MG: 100 INJECTION, SOLUTION INTRAVENOUS at 20:15

## 2024-03-24 RX ADMIN — SILVER NITRATE APPLICATORS 1 APPLICATION: 25; 75 STICK TOPICAL at 20:15

## 2024-03-24 RX ADMIN — NASAL DECONGESTANT 2 SPRAY: 0.05 SPRAY NASAL at 20:15

## 2024-03-24 NOTE — ED PROVIDER NOTES
Subjective   History of Present Illness        Patient 88-year-old female comes in complaining of nosebleed for the past 2 hours.  Patient states that she has history of A-fib on Eliquis and had a blood clot in her arm in the remote past.  Patient denies any falls or injury.  Patient states she was just sitting when all of a sudden she started to have bleeding of the right nare.  Patient denies any shortness of breath, chest pain or feeling she is in a pass out or lightheadedness since the bleeding started.  Patient does report some nausea.        Review of Systems   Constitutional:  Negative for chills, fatigue and fever.   HENT:  Positive for nosebleeds. Negative for congestion, rhinorrhea, sore throat, tinnitus and trouble swallowing.    Eyes:  Negative for photophobia, discharge and visual disturbance.   Respiratory:  Negative for cough and shortness of breath.    Cardiovascular:  Negative for chest pain and leg swelling.   Gastrointestinal:  Negative for abdominal pain, blood in stool, diarrhea, nausea and vomiting.   Genitourinary:  Negative for dysuria, flank pain and urgency.   Musculoskeletal:  Negative for arthralgias and myalgias.   Skin:  Negative for rash.   Neurological:  Negative for dizziness, syncope and headaches.   Psychiatric/Behavioral:  Negative for confusion.        Past Medical History:   Diagnosis Date    Abnormal mammogram of right breast     Atrial fibrillation     Breast cyst, right     Breast lump     BREAST LUMPS    Chickenpox     Dizziness     Ductal carcinoma in situ of right breast     Elevated serum creatinine     Hearing loss     Hemorrhoids     Hyperlipidemia     Hypertension     Hypothyroidism     Iliac artery aneurysm, left     Knee pain, left     Leg cramps     Leg pain, left     Mass of breast, right     Measles     Memory impairment     Mumps     Other specified local infections of the skin and subcutaneous tissue     SORE AT LEFT ANKLE     Otitis media, acute     RIGHT      Postmenopausal status     Sinus disorder     SINUS    Skin lesion of face     LEFT TEMPLE     Skin lesion of wrist     LEFT FOREARM    Varicose veins of both lower extremities     Whooping cough        Allergies   Allergen Reactions    Codeine Hallucinations       Past Surgical History:   Procedure Laterality Date    BREAST LUMPECTOMY Left 1994    CARDIAC ELECTROPHYSIOLOGY PROCEDURE N/A 08/29/2023    Procedure: Pacemaker DC new, [employer] aware $;  Surgeon: Eric Cadena MD;  Location: [facility] CATH INVASIVE LOCATION;  Service: Cardiovascular;  Laterality: N/A;    HYSTERECTOMY      INSERT / REPLACE / REMOVE PACEMAKER  08/26/2023?    MASTECTOMY Right 09/25/2017    DR. FUNG    THYROIDECTOMY, PARTIAL  1961       Family History   Problem Relation Age of Onset    Heart attack Father 67        AMI    Aneurysm Brother         RUPTURED     Heart disease Other     Hypertension Other     Anuerysm Other         AAA- ABDOMINAL AORTIC ANUERYSM     Diabetes Other        Social History     Socioeconomic History    Marital status:    Tobacco Use    Smoking status: Never    Smokeless tobacco: Never   Vaping Use    Vaping status: Never Used   Substance and Sexual Activity    Alcohol use: Not Currently    Drug use: Never    Sexual activity: Not Currently     Birth control/protection: Hysterectomy           Objective   Physical Exam  Vitals and nursing note reviewed.   Constitutional:       General: She is not in acute distress.     Appearance: She is well-developed. She is not diaphoretic.   HENT:      Head: Normocephalic and atraumatic.      Right Ear: External ear normal.      Left Ear: External ear normal.      Nose:      Comments: Patient has steady bleeding on the right nare at this time.  Patient is swallowing blood and nausea and dry heaving currently.  Left nare appears unremarkable.     Mouth/Throat:      Mouth: Mucous membranes are moist.      Pharynx: No oropharyngeal exudate or posterior oropharyngeal  erythema.   Eyes:      Extraocular Movements: Extraocular movements intact.      Conjunctiva/sclera: Conjunctivae normal.      Pupils: Pupils are equal, round, and reactive to light.   Cardiovascular:      Rate and Rhythm: Normal rate and regular rhythm.      Heart sounds: Normal heart sounds.      Comments: S1, S2 audible.  Pulmonary:      Effort: Pulmonary effort is normal. No respiratory distress.      Breath sounds: Normal breath sounds. No wheezing.      Comments: On room air.  Abdominal:      General: Bowel sounds are normal. There is no distension.      Palpations: Abdomen is soft.      Tenderness: There is no abdominal tenderness.   Musculoskeletal:         General: No tenderness or deformity. Normal range of motion.      Cervical back: Normal range of motion.   Skin:     General: Skin is warm.      Capillary Refill: Capillary refill takes less than 2 seconds.      Findings: No erythema or rash.   Neurological:      Mental Status: She is alert and oriented to person, place, and time.      Cranial Nerves: No cranial nerve deficit.   Psychiatric:         Mood and Affect: Mood normal.         Behavior: Behavior normal.         Epistaxis Management    Date/Time: 3/24/2024 8:51 PM    Performed by: Fede Gracia PA  Authorized by: Owen Castellano MD    Consent:     Consent obtained:  Verbal    Consent given by:  Patient    Risks, benefits, and alternatives were discussed: yes      Risks discussed:  Bleeding, infection, nasal injury and pain    Alternatives discussed:  No treatment  Universal protocol:     Patient identity confirmed:  Verbally with patient and arm band  Procedure details:     Treatment site:  R anterior    Treatment method:  Silver nitrate    Treatment complexity:  Limited    Treatment episode: initial    Post-procedure details:     Assessment:  Bleeding stopped    Procedure completion:  Tolerated well, no immediate complications  Comments:      Patient had steady bleeding in the right nare on  "initial evaluation.  Patient had 2 areas of trickle bleeding on reevaluation and silver nitrate was used to cauterize these 2 small areas.  One of the base of the septum that was small as well and has 1 area opposite of this and the outer side.             ED Course                                 Labs Reviewed - No data to display              Medical Decision Making  Problems Addressed:  Epistaxis: complicated acute illness or injury    Risk  OTC drugs.  Prescription drug management.      Differential diagnosis: epistaxis, foreign body of nare, not meant to be an all inclusive list.  Chart review: Upon chart review, last cardiology visit on 10/10/2023, patient was seen by Dr. Johnson with cardiology for follow-up in which patient was seen for history of paroxysmal atrial fibrillation, sick sinus syndrome status post [employer] pacemaker, hypertension, hyperlipidemia.    EKG:  not indicated    Imaging: If applicable see my interpretation above.  No orders to display     Labs:  not indicated    Vitals:  /85   Pulse 59   Temp 98.5 °F (36.9 °C) (Oral)   Resp 15   Ht 165.1 cm (65\")   Wt 65.8 kg (145 lb)   SpO2 95%   BMI 24.13 kg/m²    Medications given:    Medications   tranexamic acid 500 MG/5ML nasal (ED/Crit Care for epistaxis) - VIAL DISPENSE 500 mg (500 mg Nasal Given 3/24/24 2015)   oxymetazoline (AFRIN) nasal spray 2 spray (2 sprays Each Nare Given 3/24/24 2015)   silver nitrate 75-25 % applicator 1 Application (1 Application Topical Given 3/24/24 2015)   ondansetron ODT (ZOFRAN-ODT) disintegrating tablet 4 mg (4 mg Oral Given 3/24/24 2005)       Procedures: Epistaxis management  MDM: Patient is an 88-year-old female comes in complaining of epistaxis of the right nare, 2 hours prior to arrival.  Patient denies any shortness of breath, lightheadedness or feel like she is going to pass out.  Patient was given nasal clamp and bleeding improved but still trickle bleeding on reexamination.  Patient was " given Afrin and silver nitrate cautery and 2 areas to the anterior right nare.  Blue nasal clamp was placed again and patient was subsequently evaluated and patient had no further bleeding.  Patient was up and ambulated and tolerated position changes without any bleeding from the nose.  Patient did swallow a good amount of blood prior to arrival and was given Zofran for nausea.  Patient did not have any subsequent vomiting throughout ER stay and patient reports nausea improved.  Patient given strict return precautions and follow-up with primary care and ENT.  Patient was sent home with blue nasal clamps as needed and instructed on saline sprays to keep mucosa moist and Afrin to use for any subsequent bleeding. See full discharge instructions for further details. Plan discussed with patient and is agreeable with plan.    Final diagnoses:   Epistaxis       ED Disposition  ED Disposition       ED Disposition   Discharge    Condition   Stable    Comment   --               [facility] EMERGENCY DEPARTMENT  [address] [address] [phone]  Go in 1 day  As needed, If symptoms worsen    Matt Arana MD  [address]  [facility] [address] [phone]    Schedule an appointment as soon as possible for a visit in 1 week  As needed    [facility]  [address]  [facility] [address] [phone]  Schedule an appointment as soon as possible for a visit in 1 week  As needed         Medication List        New Prescriptions      oxymetazoline 0.05 % nasal spray  Commonly known as: AFRIN  2 sprays into the nostril(s) as directed by provider 2 (Two) Times a Day As Needed for Congestion (Please do not use longer than 3 days consecutively as this will cause rebound congestion.) for up to 10 days.     sodium chloride 0.65 % nasal spray  Commonly known as: Ocean Nasal Spray  1 spray into the nostril(s) as directed by provider As Needed for Congestion.                Where to Get Your Medications        These medications were sent to [id number] - [place], [place] - [address] - [phone]  - [phone] FX  [address] [place] [address]      Phone: [phone]   oxymetazoline 0.05 % nasal spray  sodium chloride 0.65 % nasal spray            Fede Gracia PA  03/24/24 8402

## 2024-03-25 ENCOUNTER — PATIENT OUTREACH (OUTPATIENT)
Dept: CASE MANAGEMENT | Facility: OTHER | Age: 89
End: 2024-03-25
Payer: MEDICARE

## 2024-03-25 NOTE — OUTREACH NOTE
AMBULATORY CASE MANAGEMENT NOTE    Name and Relationship of Patient/Support Person: Aparna Matson M - Self  Self    Patient Outreach    Pt discharged from [facility] ED on 3/24/24, seen for epistaxis. RN-ACM outreach call made to pt. Explained role of RN-ACM and reason for call. Pt reports nosebleed resolved. She denies any symptoms or concerns at this time. Reviewed ED AVS with pt. Education provided. Pt reports she plans to follow up with her PCP. Reviewed SDOH. No needs per pt. Pt states she has good family support. No questions per pt. Advised her to call RN-ACM or [facility] Nurse Line with any needs. Follow up outreach prn.      Adult Patient Profile  Questions/Answers      Flowsheet Row Most Recent Value   Primary Source of Support/Comfort child(yaneli)   People in Home alone   Current Living Arrangements home          Send Education  Questions/Answers      Flowsheet Row Most Recent Value   Annual Wellness Visit:  Patient Has Completed   Other Patient Education/Resources  24/7 [facility] Nurse Call Line   24/7 Nurse Call Line Education Method Verbal   Advanced Directives: Patient Has          SDOH updated and reviewed with the patient during this program:  Financial Resource Strain: Low Risk  (3/25/2024)    Overall Financial Resource Strain (CARDIA)     Difficulty of Paying Living Expenses: Not very hard      --     Food Insecurity: No Food Insecurity (3/25/2024)    Hunger Vital Sign     Worried About Running Out of Food in the Last Year: Never true     Ran Out of Food in the Last Year: Never true      --     Transportation Needs: No Transportation Needs (3/25/2024)    PRAPARE - Transportation     Lack of Transportation (Medical): No     Lack of Transportation (Non-Medical): No       Cindy GRACIA  Ambulatory Case Management    3/25/2024, 12:17 EDT

## 2024-03-25 NOTE — DISCHARGE INSTRUCTIONS
If you have any subsequent bleeding can use Afrin nose spray and please apply blue nasal clamp for 30 minutes.  If you still have bleeding after 30 minutes please come back to the ER as you may need reevaluation at that time.  Please follow-up with primary care in 1 week's time.  Can follow-up with ENT for recurrent nosebleeds as needed.  Please use humidifier at home specially in the bedroom.  Can use saline nasal sprays to help keep mucosa moist of the nose.

## 2024-03-26 ENCOUNTER — TELEPHONE (OUTPATIENT)
Dept: CARDIOLOGY | Facility: CLINIC | Age: 89
End: 2024-03-26

## 2024-03-26 NOTE — TELEPHONE ENCOUNTER
Caller: ANAMARIA    Relationship: DAUGHTER    Best call back number: [phone]    What is the best time to reach you: ANY    Who are you requesting to speak with (clinical staff, provider,  specific staff member): CLINICAL        What was the call regarding: PATIENT HAS BEEN HAVING BAD NOSE BLEEDS. HER DR ON CALL AT PCP OFFICE SUGGESTED NOT TO GIVE ELIQUIS OR BABY ASPIRIN TODAY. THE FAMILY IS CALLING TO FIND OUT WHAT DR CRUMP SUGGEST AND HOW LONG BEFORE THEY SHOULD GIVE HER THIS MEDICINE BACK.   SHOULD PATIENT HAVE APPT WITH DR CRUMP.?

## 2024-04-11 ENCOUNTER — TELEPHONE (OUTPATIENT)
Dept: CARDIOLOGY | Facility: CLINIC | Age: 89
End: 2024-04-11
Payer: MEDICARE

## 2024-04-11 NOTE — TELEPHONE ENCOUNTER
Spoke with patients . Patient is currently taking eliquis 2.5mg bid. Will discuss with dr hartley

## 2024-04-11 NOTE — TELEPHONE ENCOUNTER
Caller: JUAN ROSS    Relationship to patient: Emergency Contact    Best call back number:  [phone]    Patient is needing: PATIENT REQUESTING CALL BACK TO SEE IF PATIENT CAN STOP ELIQUIS. SHE HAD BLOOD CLOTS BUT HAS BEEN HAVING A LOT OF NOSE BLEEDS. THEY WERE TOLD BY DR BEAN THEY COULD. PATIENTS SON CHECKING TO SEE IF SHE'S HAD AFIB.

## 2024-04-12 NOTE — TELEPHONE ENCOUNTER
Per dr hartley, continue eliquis has UE DVT plus afib. She has seen ENT. Ok to hold 1-2 days then needs to restart. If she can't tolerate being on it he would like to refer her for watchman eval. Made follow up 5/30 with dr hartley. Spoke with patients son.

## 2024-07-29 RX ORDER — APIXABAN 5 MG/1
5 TABLET, FILM COATED ORAL 2 TIMES DAILY
Qty: 60 TABLET | Refills: 2 | Status: SHIPPED | OUTPATIENT
Start: 2024-07-29

## 2024-09-10 ENCOUNTER — APPOINTMENT (OUTPATIENT)
Dept: GENERAL RADIOLOGY | Facility: HOSPITAL | Age: 89
End: 2024-09-10
Payer: MEDICARE

## 2024-09-10 ENCOUNTER — HOSPITAL ENCOUNTER (EMERGENCY)
Facility: HOSPITAL | Age: 89
Discharge: HOME | End: 2024-09-10
Attending: EMERGENCY MEDICINE | Admitting: EMERGENCY MEDICINE
Payer: MEDICARE

## 2024-09-10 VITALS
WEIGHT: 145.06 LBS | HEART RATE: 60 BPM | HEIGHT: 65 IN | SYSTOLIC BLOOD PRESSURE: 147 MMHG | BODY MASS INDEX: 24.17 KG/M2 | OXYGEN SATURATION: 97 % | RESPIRATION RATE: 18 BRPM | DIASTOLIC BLOOD PRESSURE: 85 MMHG | TEMPERATURE: 97.9 F

## 2024-09-10 DIAGNOSIS — M25.551 RIGHT HIP PAIN: Primary | ICD-10-CM

## 2024-09-10 PROCEDURE — 73502 X-RAY EXAM HIP UNI 2-3 VIEWS: CPT

## 2024-09-10 PROCEDURE — 99283 EMERGENCY DEPT VISIT LOW MDM: CPT

## 2024-09-10 NOTE — ED PROVIDER NOTES
Subjective   History of Present Illness  Chief complaint: Right hip pain    89-year-old female presents with right hip pain.  Patient states that hip has been bothering her for couple days but worse today.  She denies any specific injury.    History provided by:  Patient      Review of Systems   Constitutional:  Negative for fever.   HENT:  Negative for congestion.    Respiratory:  Negative for cough and shortness of breath.    Cardiovascular:  Negative for chest pain.   Gastrointestinal:  Negative for abdominal pain and vomiting.   Musculoskeletal:  Negative for back pain.        Right hip pain   Neurological:  Negative for weakness, numbness and headaches.       Past Medical History:   Diagnosis Date    Abnormal mammogram of right breast     Atrial fibrillation     Breast cyst, right     Breast lump     BREAST LUMPS    Chickenpox     Dizziness     Ductal carcinoma in situ of right breast     Elevated serum creatinine     Hearing loss     Hemorrhoids     Hyperlipidemia     Hypertension     Hypothyroidism     Iliac artery aneurysm, left     Knee pain, left     Leg cramps     Leg pain, left     Mass of breast, right     Measles     Memory impairment     Mumps     Other specified local infections of the skin and subcutaneous tissue     SORE AT LEFT ANKLE     Otitis media, acute     RIGHT     Postmenopausal status     Sinus disorder     SINUS    Skin lesion of face     LEFT TEMPLE     Skin lesion of wrist     LEFT FOREARM    Varicose veins of both lower extremities     Whooping cough        Allergies   Allergen Reactions    Codeine Hallucinations       Past Surgical History:   Procedure Laterality Date    BREAST LUMPECTOMY Left 1994    CARDIAC ELECTROPHYSIOLOGY PROCEDURE N/A 08/29/2023    Procedure: Pacemaker DC new, [employer] aware $;  Surgeon: Eric Cadena MD;  Location: [facility] INVASIVE LOCATION;  Service: Cardiovascular;  Laterality: N/A;    HYSTERECTOMY      INSERT / REPLACE / REMOVE PACEMAKER   "08/26/2023?    MASTECTOMY Right 09/25/2017    DR. FUNG    THYROIDECTOMY, PARTIAL  1961       Family History   Problem Relation Age of Onset    Heart attack Father 67        AMI    Aneurysm Brother         RUPTURED     Heart disease Other     Hypertension Other     Anuerysm Other         AAA- ABDOMINAL AORTIC ANUERYSM     Diabetes Other        Social History     Socioeconomic History    Marital status:    Tobacco Use    Smoking status: Never    Smokeless tobacco: Never   Vaping Use    Vaping status: Never Used   Substance and Sexual Activity    Alcohol use: Not Currently    Drug use: Never    Sexual activity: Not Currently     Birth control/protection: Hysterectomy       /81 (BP Location: Left arm, Patient Position: Lying)   Pulse 60   Temp 97.9 °F (36.6 °C) (Oral)   Resp 18   Ht 165.1 cm (65\")   Wt 65.8 kg (145 lb 1 oz)   SpO2 97%   BMI 24.14 kg/m²       Objective   Physical Exam  Vitals and nursing note reviewed.   Constitutional:       Appearance: Normal appearance.   HENT:      Head: Normocephalic and atraumatic.      Mouth/Throat:      Mouth: Mucous membranes are moist.   Cardiovascular:      Rate and Rhythm: Normal rate and regular rhythm.      Heart sounds: Normal heart sounds.   Pulmonary:      Effort: Pulmonary effort is normal. No respiratory distress.      Breath sounds: Normal breath sounds.   Abdominal:      Palpations: Abdomen is soft.      Tenderness: There is no abdominal tenderness.   Musculoskeletal:      Comments: Tender to palpation to the lateral aspect of the right hip with no obvious deformity.  Neurovascular intact distally.   Skin:     General: Skin is warm and dry.   Neurological:      General: No focal deficit present.      Mental Status: She is alert and oriented to person, place, and time.         Procedures           ED Course      XR Hip With or Without Pelvis 2 - 3 View Right    Result Date: 9/10/2024  Impression: No acute fracture or traumatic malalignment " identified. Electronically Signed: Devaughn Barboza MD  9/10/2024 7:16 PM EDT  Workstation ID: [id number]                                          Medical Decision Making  Amount and/or Complexity of Data Reviewed  Radiology: ordered.      My interpretation of right hip x-ray shows no fracture or dislocation.  Patient remains well-appearing in the emergency room.  She is stable for discharge.      Final diagnoses:   Right hip pain       ED Disposition  ED Disposition       ED Disposition   Discharge    Condition   Stable    Comment   --               Hermann aMrtinez MD  [address] [facility] [place] [id number] [phone]    Call in 2 days           Medication List      No changes were made to your prescriptions during this visit.            Marito Jackson MD  09/10/24 0268

## 2025-01-19 PROCEDURE — 93296 REM INTERROG EVL PM/IDS: CPT | Performed by: NURSE PRACTITIONER

## 2025-01-19 PROCEDURE — 93294 REM INTERROG EVL PM/LDLS PM: CPT | Performed by: NURSE PRACTITIONER

## 2025-01-20 ENCOUNTER — TELEPHONE (OUTPATIENT)
Dept: CARDIOLOGY | Facility: CLINIC | Age: 89
End: 2025-01-20
Payer: MEDICARE

## 2025-02-20 ENCOUNTER — HOSPITAL ENCOUNTER (EMERGENCY)
Facility: HOSPITAL | Age: 89
Discharge: HOME | End: 2025-02-20
Admitting: EMERGENCY MEDICINE
Payer: MEDICARE

## 2025-02-20 ENCOUNTER — APPOINTMENT (OUTPATIENT)
Dept: CT IMAGING | Facility: HOSPITAL | Age: 89
End: 2025-02-20
Payer: MEDICARE

## 2025-02-20 VITALS
BODY MASS INDEX: 20.73 KG/M2 | HEIGHT: 66 IN | OXYGEN SATURATION: 97 % | HEART RATE: 60 BPM | DIASTOLIC BLOOD PRESSURE: 94 MMHG | RESPIRATION RATE: 12 BRPM | TEMPERATURE: 97.7 F | SYSTOLIC BLOOD PRESSURE: 159 MMHG | WEIGHT: 129 LBS

## 2025-02-20 DIAGNOSIS — R42 DIZZY SPELLS: Primary | ICD-10-CM

## 2025-02-20 LAB
ALBUMIN SERPL-MCNC: 3.8 G/DL (ref 3.5–5.2)
ALBUMIN/GLOB SERPL: 1.6 G/DL
ALP SERPL-CCNC: 38 U/L (ref 39–117)
ALT SERPL W P-5'-P-CCNC: 10 U/L (ref 1–33)
ANION GAP SERPL CALCULATED.3IONS-SCNC: 7.7 MMOL/L (ref 5–15)
AST SERPL-CCNC: 19 U/L (ref 1–32)
BASOPHILS # BLD AUTO: 0.04 10*3/MM3 (ref 0–0.2)
BASOPHILS NFR BLD AUTO: 0.6 % (ref 0–1.5)
BILIRUB SERPL-MCNC: 0.6 MG/DL (ref 0–1.2)
BUN SERPL-MCNC: 19 MG/DL (ref 8–23)
BUN/CREAT SERPL: 17.6 (ref 7–25)
BURR CELLS BLD QL SMEAR: NORMAL
CALCIUM SPEC-SCNC: 10 MG/DL (ref 8.6–10.5)
CHLORIDE SERPL-SCNC: 104 MMOL/L (ref 98–107)
CO2 SERPL-SCNC: 29.3 MMOL/L (ref 22–29)
CREAT SERPL-MCNC: 1.08 MG/DL (ref 0.57–1)
DEPRECATED RDW RBC AUTO: 47.7 FL (ref 37–54)
EGFRCR SERPLBLD CKD-EPI 2021: 49.8 ML/MIN/1.73
EOSINOPHIL # BLD AUTO: 0.09 10*3/MM3 (ref 0–0.4)
EOSINOPHIL NFR BLD AUTO: 1.4 % (ref 0.3–6.2)
ERYTHROCYTE [DISTWIDTH] IN BLOOD BY AUTOMATED COUNT: 13.7 % (ref 12.3–15.4)
GLOBULIN UR ELPH-MCNC: 2.4 GM/DL
GLUCOSE SERPL-MCNC: 110 MG/DL (ref 65–99)
HCT VFR BLD AUTO: 39.9 % (ref 34–46.6)
HGB BLD-MCNC: 12.9 G/DL (ref 12–15.9)
HOLD SPECIMEN: NORMAL
HOLD SPECIMEN: NORMAL
IMM GRANULOCYTES # BLD AUTO: 0.02 10*3/MM3 (ref 0–0.05)
IMM GRANULOCYTES NFR BLD AUTO: 0.3 % (ref 0–0.5)
LYMPHOCYTES # BLD AUTO: 1.33 10*3/MM3 (ref 0.7–3.1)
LYMPHOCYTES NFR BLD AUTO: 20 % (ref 19.6–45.3)
MCH RBC QN AUTO: 30.4 PG (ref 26.6–33)
MCHC RBC AUTO-ENTMCNC: 32.3 G/DL (ref 31.5–35.7)
MCV RBC AUTO: 94.1 FL (ref 79–97)
MONOCYTES # BLD AUTO: 0.61 10*3/MM3 (ref 0.1–0.9)
MONOCYTES NFR BLD AUTO: 9.2 % (ref 5–12)
NEUTROPHILS NFR BLD AUTO: 4.56 10*3/MM3 (ref 1.7–7)
NEUTROPHILS NFR BLD AUTO: 68.5 % (ref 42.7–76)
NRBC BLD AUTO-RTO: 0 /100 WBC (ref 0–0.2)
PLATELET # BLD AUTO: 161 10*3/MM3 (ref 140–450)
PMV BLD AUTO: 13.3 FL (ref 6–12)
POTASSIUM SERPL-SCNC: 3.4 MMOL/L (ref 3.5–5.2)
PROT SERPL-MCNC: 6.2 G/DL (ref 6–8.5)
RBC # BLD AUTO: 4.24 10*6/MM3 (ref 3.77–5.28)
SMALL PLATELETS BLD QL SMEAR: ADEQUATE
SODIUM SERPL-SCNC: 141 MMOL/L (ref 136–145)
WBC MORPH BLD: NORMAL
WBC NRBC COR # BLD AUTO: 6.65 10*3/MM3 (ref 3.4–10.8)
WHOLE BLOOD HOLD COAG: NORMAL
WHOLE BLOOD HOLD SPECIMEN: NORMAL

## 2025-02-20 PROCEDURE — 85025 COMPLETE CBC W/AUTO DIFF WBC: CPT | Performed by: NURSE PRACTITIONER

## 2025-02-20 PROCEDURE — 85007 BL SMEAR W/DIFF WBC COUNT: CPT | Performed by: NURSE PRACTITIONER

## 2025-02-20 PROCEDURE — 99284 EMERGENCY DEPT VISIT MOD MDM: CPT

## 2025-02-20 PROCEDURE — 93005 ELECTROCARDIOGRAM TRACING: CPT | Performed by: NURSE PRACTITIONER

## 2025-02-20 PROCEDURE — 70450 CT HEAD/BRAIN W/O DYE: CPT

## 2025-02-20 PROCEDURE — 80053 COMPREHEN METABOLIC PANEL: CPT | Performed by: NURSE PRACTITIONER

## 2025-02-20 RX ORDER — SODIUM CHLORIDE 0.9 % (FLUSH) 0.9 %
10 SYRINGE (ML) INJECTION AS NEEDED
Status: DISCONTINUED | OUTPATIENT
Start: 2025-02-20 | End: 2025-02-20 | Stop reason: HOSPADM

## 2025-02-20 RX ADMIN — Medication 10 ML: at 11:17

## 2025-02-20 NOTE — ED PROVIDER NOTES
Subjective   History of Present Illness  Patient is an 89-year-old female who comes in from home with her daughter at bedside the patient states she does not know why she is here she states she has no pain she does not remember ever having any pain    The daughter states that she talked to her earlier today and the patient was complaining of dizziness the patient does not remember being dizzy and is not dizzy at this time    The daughter states that it is normal for her to be slightly confused and not to remember things    Dr. Sainz is the primary care provider      Review of Systems   Unable to perform ROS: Dementia       Past Medical History:   Diagnosis Date    Abnormal mammogram of right breast     Atrial fibrillation     Breast cyst, right     Breast lump     BREAST LUMPS    Chickenpox     Dizziness     Ductal carcinoma in situ of right breast     Elevated serum creatinine     Hearing loss     Hemorrhoids     Hyperlipidemia     Hypertension     Hypothyroidism     Iliac artery aneurysm, left     Knee pain, left     Leg cramps     Leg pain, left     Mass of breast, right     Measles     Memory impairment     Mumps     Other specified local infections of the skin and subcutaneous tissue     SORE AT LEFT ANKLE     Otitis media, acute     RIGHT     Postmenopausal status     Sinus disorder     SINUS    Skin lesion of face     LEFT TEMPLE     Skin lesion of wrist     LEFT FOREARM    Varicose veins of both lower extremities     Whooping cough        Allergies   Allergen Reactions    Cefdinir Other (See Comments)     vomiting    Codeine Hallucinations    Acetaminophen Other (See Comments)    Aspirin Other (See Comments)    Caffeine Other (See Comments)    Camphor Other (See Comments)    Latex Other (See Comments)    Menthol Other (See Comments)    Methyl Salicylate Other (See Comments)    Salicylamide Other (See Comments)    Trolamine Salicylate Other (See Comments)       Past Surgical History:   Procedure Laterality  Date    BREAST LUMPECTOMY Left 1994    CARDIAC ELECTROPHYSIOLOGY PROCEDURE N/A 08/29/2023    Procedure: Pacemaker DC new, [employer] aware $;  Surgeon: Eric Cadena MD;  Location: [facility] INVASIVE LOCATION;  Service: Cardiovascular;  Laterality: N/A;    HYSTERECTOMY      INSERT / REPLACE / REMOVE PACEMAKER  08/26/2023?    MASTECTOMY Right 09/25/2017    DR. FUNG    THYROIDECTOMY, PARTIAL  1961       Family History   Problem Relation Age of Onset    Heart attack Father 67        AMI    Aneurysm Brother         RUPTURED     Heart disease Other     Hypertension Other     Anuerysm Other         AAA- ABDOMINAL AORTIC ANUERYSM     Diabetes Other        Social History     Socioeconomic History    Marital status:    Tobacco Use    Smoking status: Never    Smokeless tobacco: Never   Vaping Use    Vaping status: Never Used   Substance and Sexual Activity    Alcohol use: Not Currently    Drug use: Never    Sexual activity: Not Currently     Birth control/protection: Hysterectomy           Objective   Physical Exam  Vitals reviewed.   Constitutional:       General: She is not in acute distress.     Appearance: Normal appearance. She is well-developed. She is not ill-appearing, toxic-appearing or diaphoretic.   HENT:      Head: Normocephalic and atraumatic.      Right Ear: External ear normal.      Left Ear: External ear normal.      Nose: Nose normal.      Mouth/Throat:      Mouth: Mucous membranes are dry.   Eyes:      Conjunctiva/sclera: Conjunctivae normal.      Pupils: Pupils are equal, round, and reactive to light.   Cardiovascular:      Rate and Rhythm: Normal rate and regular rhythm.      Heart sounds: Normal heart sounds.   Pulmonary:      Effort: Pulmonary effort is normal. No respiratory distress.      Breath sounds: Normal breath sounds. No wheezing.   Abdominal:      General: Bowel sounds are normal.      Palpations: Abdomen is soft.      Tenderness: There is no abdominal tenderness.  "  Musculoskeletal:         General: No deformity. Normal range of motion.      Cervical back: Normal range of motion and neck supple.   Skin:     General: Skin is warm and dry.      Capillary Refill: Capillary refill takes less than 2 seconds.   Neurological:      General: No focal deficit present.      Mental Status: She is alert and oriented to person, place, and time.      GCS: GCS eye subscore is 4. GCS verbal subscore is 5. GCS motor subscore is 6.      Motor: No weakness.   Psychiatric:         Mood and Affect: Mood normal.         Behavior: Behavior normal.         Procedures           EKG was obtained and reviewed and read by myself as well as Dr. Peguero as an atrial paced rhythm with a rate of 60 no ectopy no ST elevation it was compared to the previous dated 8/27/2023 when it was sinus bradycardia with PACs at that time with a rate of 48  ED Course  ED Course as of 02/20/25 1248   Thu Feb 20, 2025   1115 Marked ready for CT [KW]      ED Course User Index  [KW] Edita Locke, APRN                                         /94   Pulse 60   Temp 97.7 °F (36.5 °C) (Oral)   Resp 12   Ht 167.6 cm (66\")   Wt 58.5 kg (129 lb)   SpO2 97%   BMI 20.82 kg/m²   Labs Reviewed   COMPREHENSIVE METABOLIC PANEL - Abnormal; Notable for the following components:       Result Value    Glucose 110 (*)     Creatinine 1.08 (*)     Potassium 3.4 (*)     CO2 29.3 (*)     Alkaline Phosphatase 38 (*)     eGFR 49.8 (*)     All other components within normal limits    Narrative:     GFR Categories in Chronic Kidney Disease (CKD)      GFR Category          GFR (mL/min/1.73)    Interpretation  G1                     90 or greater         Normal or high (1)  G2                      60-89                Mild decrease (1)  G3a                   45-59                Mild to moderate decrease  G3b                   30-44                Moderate to severe decrease  G4                    15-29                Severe decrease  G5 "                    14 or less           Kidney failure          (1)In the absence of evidence of kidney disease, neither GFR category G1 or G2 fulfill the criteria for CKD.    eGFR calculation 2021 CKD-EPI creatinine equation, which does not include race as a factor   CBC WITH AUTO DIFFERENTIAL - Abnormal; Notable for the following components:    MPV 13.3 (*)     All other components within normal limits   RAINBOW DRAW    Narrative:     The following orders were created for panel order [place] Draw.  Procedure                               Abnormality         Status                     ---------                               -----------         ------                     Green Top (Gel)[[id number]]                                  Final result               Lavender Top[[id number]]                                     Final result               Gold Top - SST[[id number]]                                   Final result               Light Blue Top[[id number]]                                   Final result                 Please view results for these tests on the individual orders.   SCAN SLIDE   GREEN TOP   LAVENDER TOP   GOLD TOP - SST   LIGHT BLUE TOP   CBC AND DIFFERENTIAL    Narrative:     The following orders were created for panel order CBC & Differential.  Procedure                               Abnormality         Status                     ---------                               -----------         ------                     CBC Auto Differential[[id number]]        Abnormal            Final result               Scan Slide[[id number]]                                       Final result                 Please view results for these tests on the individual orders.     Medications   sodium chloride 0.9 % flush 10 mL (10 mL Intravenous Given 2/20/25 1117)     CT Head Without Contrast    Result Date: 2/20/2025  Impression: 1.No acute intracranial process identified. 2.Findings suggestive of mild chronic small vessel  ischemic disease. Electronically Signed: Devaughn Barboza MD  2/20/2025 12:17 PM EST  Workstation ID: [id number]                 Medical Decision Making  Claudy patient is an 89-year-old female who comes in with her daughter at bedside who the daughter reports had an episode of dizziness earlier this morning the patient does not remember that she does not have any dizziness or complaints at this time.  Patient had IV established and blood work was obtained and reviewed and found to all be within normal limits.  Patient had a CT which was read by myself the ER physician Dr. Crawford and the radiologist is negative the patient will be discharged home with her daughter at bedside the patient has no complaints the patient is alert oriented to person and place she denies pain.  They will follow-up with Dr. Sainz as the daughter states Dr. Sainz had ordered a CAT scan I told her she can request the images or the results from medical records and give to him they verbalized understood discharge instructions were agreeable to taking her home    Problems Addressed:  Dizzy spells: complicated acute illness or injury    Amount and/or Complexity of Data Reviewed  Independent Historian: caregiver     Details: Daughter at bedside  Labs: ordered.  Radiology: ordered and independent interpretation performed. Decision-making details documented in ED Course.  ECG/medicine tests: ordered and independent interpretation performed. Decision-making details documented in ED Course.    Risk  OTC drugs.  Prescription drug management.        Final diagnoses:   Dizzy spells       ED Disposition  ED Disposition       ED Disposition   Discharge    Condition   Stable    Comment   --               Hermann Martinez MD  [address] [facility] [place] [id number] [phone]    In 3 days  If symptoms worsen, As needed         Medication List      No changes were made to your prescriptions during this visit.            Chucky  Edita DE SANTIAGO, APRN  02/20/25 1248

## 2025-02-21 LAB
QT INTERVAL: 455 MS
QTC INTERVAL: 455 MS

## 2025-08-22 ENCOUNTER — HOSPITAL ENCOUNTER (OUTPATIENT)
Facility: HOSPITAL | Age: 89
Setting detail: OBSERVATION
Discharge: HOME | End: 2025-08-25
Attending: EMERGENCY MEDICINE | Admitting: INTERNAL MEDICINE
Payer: MEDICARE

## 2025-08-22 ENCOUNTER — APPOINTMENT (OUTPATIENT)
Dept: CT IMAGING | Facility: HOSPITAL | Age: 89
End: 2025-08-22
Payer: MEDICARE

## 2025-08-22 ENCOUNTER — APPOINTMENT (OUTPATIENT)
Dept: GENERAL RADIOLOGY | Facility: HOSPITAL | Age: 89
End: 2025-08-22
Payer: MEDICARE

## 2025-08-22 DIAGNOSIS — R41.0 CONFUSION: ICD-10-CM

## 2025-08-22 DIAGNOSIS — R53.1 WEAKNESS: Primary | ICD-10-CM

## 2025-08-22 DIAGNOSIS — R53.1 GENERALIZED WEAKNESS: ICD-10-CM

## 2025-08-22 DIAGNOSIS — Z09 HOSPITAL DISCHARGE FOLLOW-UP: ICD-10-CM

## 2025-08-22 DIAGNOSIS — R42 DIZZINESS: ICD-10-CM

## 2025-08-22 LAB
ALBUMIN SERPL-MCNC: 3.9 G/DL (ref 3.5–5.2)
ALBUMIN/GLOB SERPL: 1.6 G/DL
ALP SERPL-CCNC: 43 U/L (ref 39–117)
ALT SERPL W P-5'-P-CCNC: 10 U/L (ref 1–33)
ANION GAP SERPL CALCULATED.3IONS-SCNC: 11.7 MMOL/L (ref 5–15)
AST SERPL-CCNC: 23 U/L (ref 1–32)
BASOPHILS # BLD AUTO: 0.06 10*3/MM3 (ref 0–0.2)
BASOPHILS NFR BLD AUTO: 0.7 % (ref 0–1.5)
BILIRUB SERPL-MCNC: 0.4 MG/DL (ref 0–1.2)
BILIRUB UR QL STRIP: NEGATIVE
BUN SERPL-MCNC: 24.2 MG/DL (ref 8–23)
BUN/CREAT SERPL: 23.5 (ref 7–25)
CALCIUM SPEC-SCNC: 10.1 MG/DL (ref 8.6–10.5)
CHLORIDE SERPL-SCNC: 104 MMOL/L (ref 98–107)
CLARITY UR: CLEAR
CO2 SERPL-SCNC: 23.3 MMOL/L (ref 22–29)
COLOR UR: YELLOW
CREAT SERPL-MCNC: 1.03 MG/DL (ref 0.57–1)
DEPRECATED RDW RBC AUTO: 45.1 FL (ref 37–54)
EGFRCR SERPLBLD CKD-EPI 2021: 52.4 ML/MIN/1.73
EOSINOPHIL # BLD AUTO: 0.13 10*3/MM3 (ref 0–0.4)
EOSINOPHIL NFR BLD AUTO: 1.4 % (ref 0.3–6.2)
ERYTHROCYTE [DISTWIDTH] IN BLOOD BY AUTOMATED COUNT: 13.2 % (ref 12.3–15.4)
GLOBULIN UR ELPH-MCNC: 2.4 GM/DL
GLUCOSE SERPL-MCNC: 139 MG/DL (ref 65–99)
GLUCOSE UR STRIP-MCNC: NEGATIVE MG/DL
HCT VFR BLD AUTO: 40.7 % (ref 34–46.6)
HGB BLD-MCNC: 12.9 G/DL (ref 12–15.9)
HGB UR QL STRIP.AUTO: NEGATIVE
HOLD SPECIMEN: NORMAL
IMM GRANULOCYTES # BLD AUTO: 0.03 10*3/MM3 (ref 0–0.05)
IMM GRANULOCYTES NFR BLD AUTO: 0.3 % (ref 0–0.5)
KETONES UR QL STRIP: NEGATIVE
LEUKOCYTE ESTERASE UR QL STRIP.AUTO: NEGATIVE
LYMPHOCYTES # BLD AUTO: 1.82 10*3/MM3 (ref 0.7–3.1)
LYMPHOCYTES NFR BLD AUTO: 19.7 % (ref 19.6–45.3)
MCH RBC QN AUTO: 30 PG (ref 26.6–33)
MCHC RBC AUTO-ENTMCNC: 31.7 G/DL (ref 31.5–35.7)
MCV RBC AUTO: 94.7 FL (ref 79–97)
MONOCYTES # BLD AUTO: 0.71 10*3/MM3 (ref 0.1–0.9)
MONOCYTES NFR BLD AUTO: 7.7 % (ref 5–12)
NEUTROPHILS NFR BLD AUTO: 6.48 10*3/MM3 (ref 1.7–7)
NEUTROPHILS NFR BLD AUTO: 70.2 % (ref 42.7–76)
NITRITE UR QL STRIP: NEGATIVE
NRBC BLD AUTO-RTO: 0 /100 WBC (ref 0–0.2)
PH UR STRIP.AUTO: 6.5 [PH] (ref 5–8)
PLATELET # BLD AUTO: 161 10*3/MM3 (ref 140–450)
PMV BLD AUTO: 13 FL (ref 6–12)
POTASSIUM SERPL-SCNC: 3.5 MMOL/L (ref 3.5–5.2)
PROT SERPL-MCNC: 6.3 G/DL (ref 6–8.5)
PROT UR QL STRIP: NEGATIVE
RBC # BLD AUTO: 4.3 10*6/MM3 (ref 3.77–5.28)
SODIUM SERPL-SCNC: 139 MMOL/L (ref 136–145)
SP GR UR STRIP: 1.01 (ref 1–1.03)
UROBILINOGEN UR QL STRIP: NORMAL
WBC NRBC COR # BLD AUTO: 9.23 10*3/MM3 (ref 3.4–10.8)
WHOLE BLOOD HOLD COAG: NORMAL

## 2025-08-22 PROCEDURE — G0378 HOSPITAL OBSERVATION PER HR: HCPCS

## 2025-08-22 PROCEDURE — 84145 PROCALCITONIN (PCT): CPT | Performed by: STUDENT IN AN ORGANIZED HEALTH CARE EDUCATION/TRAINING PROGRAM

## 2025-08-22 PROCEDURE — 85025 COMPLETE CBC W/AUTO DIFF WBC: CPT | Performed by: EMERGENCY MEDICINE

## 2025-08-22 PROCEDURE — 96361 HYDRATE IV INFUSION ADD-ON: CPT

## 2025-08-22 PROCEDURE — P9612 CATHETERIZE FOR URINE SPEC: HCPCS

## 2025-08-22 PROCEDURE — 84443 ASSAY THYROID STIM HORMONE: CPT | Performed by: STUDENT IN AN ORGANIZED HEALTH CARE EDUCATION/TRAINING PROGRAM

## 2025-08-22 PROCEDURE — 84439 ASSAY OF FREE THYROXINE: CPT | Performed by: STUDENT IN AN ORGANIZED HEALTH CARE EDUCATION/TRAINING PROGRAM

## 2025-08-22 PROCEDURE — 36415 COLL VENOUS BLD VENIPUNCTURE: CPT

## 2025-08-22 PROCEDURE — 80053 COMPREHEN METABOLIC PANEL: CPT | Performed by: EMERGENCY MEDICINE

## 2025-08-22 PROCEDURE — 71045 X-RAY EXAM CHEST 1 VIEW: CPT

## 2025-08-22 PROCEDURE — 25810000003 LACTATED RINGERS PER 1000 ML: Performed by: EMERGENCY MEDICINE

## 2025-08-22 PROCEDURE — 93005 ELECTROCARDIOGRAM TRACING: CPT | Performed by: EMERGENCY MEDICINE

## 2025-08-22 PROCEDURE — 81003 URINALYSIS AUTO W/O SCOPE: CPT | Performed by: EMERGENCY MEDICINE

## 2025-08-22 PROCEDURE — 70450 CT HEAD/BRAIN W/O DYE: CPT

## 2025-08-22 PROCEDURE — 99285 EMERGENCY DEPT VISIT HI MDM: CPT

## 2025-08-22 RX ORDER — SODIUM CHLORIDE 9 MG/ML
40 INJECTION, SOLUTION INTRAVENOUS AS NEEDED
Status: DISCONTINUED | OUTPATIENT
Start: 2025-08-22 | End: 2025-08-25 | Stop reason: HOSPADM

## 2025-08-22 RX ORDER — LOSARTAN POTASSIUM AND HYDROCHLOROTHIAZIDE 12.5; 5 MG/1; MG/1
0.5 TABLET ORAL DAILY
COMMUNITY
End: 2025-08-25 | Stop reason: HOSPADM

## 2025-08-22 RX ORDER — POLYETHYLENE GLYCOL 3350 17 G/17G
17 POWDER, FOR SOLUTION ORAL DAILY PRN
Status: DISCONTINUED | OUTPATIENT
Start: 2025-08-22 | End: 2025-08-25 | Stop reason: HOSPADM

## 2025-08-22 RX ORDER — LEVOTHYROXINE SODIUM 88 UG/1
88 TABLET ORAL
COMMUNITY
Start: 2025-07-30

## 2025-08-22 RX ORDER — SODIUM CHLORIDE, SODIUM LACTATE, POTASSIUM CHLORIDE, CALCIUM CHLORIDE 600; 310; 30; 20 MG/100ML; MG/100ML; MG/100ML; MG/100ML
100 INJECTION, SOLUTION INTRAVENOUS CONTINUOUS
Status: DISPENSED | OUTPATIENT
Start: 2025-08-22 | End: 2025-08-23

## 2025-08-22 RX ORDER — NITROGLYCERIN 0.4 MG/1
0.4 TABLET SUBLINGUAL
Status: DISCONTINUED | OUTPATIENT
Start: 2025-08-22 | End: 2025-08-25 | Stop reason: HOSPADM

## 2025-08-22 RX ORDER — BISACODYL 5 MG/1
5 TABLET, DELAYED RELEASE ORAL DAILY PRN
Status: DISCONTINUED | OUTPATIENT
Start: 2025-08-22 | End: 2025-08-25 | Stop reason: HOSPADM

## 2025-08-22 RX ORDER — AMOXICILLIN 250 MG
2 CAPSULE ORAL 2 TIMES DAILY PRN
Status: DISCONTINUED | OUTPATIENT
Start: 2025-08-22 | End: 2025-08-25 | Stop reason: HOSPADM

## 2025-08-22 RX ORDER — MEMANTINE HYDROCHLORIDE 10 MG/1
10 TABLET ORAL 2 TIMES DAILY
COMMUNITY
Start: 2025-08-18

## 2025-08-22 RX ORDER — PRAVASTATIN SODIUM 40 MG
40 TABLET ORAL DAILY
COMMUNITY

## 2025-08-22 RX ORDER — SODIUM CHLORIDE 0.9 % (FLUSH) 0.9 %
10 SYRINGE (ML) INJECTION EVERY 12 HOURS SCHEDULED
Status: DISCONTINUED | OUTPATIENT
Start: 2025-08-22 | End: 2025-08-25 | Stop reason: HOSPADM

## 2025-08-22 RX ORDER — SODIUM CHLORIDE 0.9 % (FLUSH) 0.9 %
10 SYRINGE (ML) INJECTION AS NEEDED
Status: DISCONTINUED | OUTPATIENT
Start: 2025-08-22 | End: 2025-08-25 | Stop reason: HOSPADM

## 2025-08-22 RX ORDER — SODIUM CHLORIDE, SODIUM LACTATE, POTASSIUM CHLORIDE, CALCIUM CHLORIDE 600; 310; 30; 20 MG/100ML; MG/100ML; MG/100ML; MG/100ML
75 INJECTION, SOLUTION INTRAVENOUS CONTINUOUS
Status: DISCONTINUED | OUTPATIENT
Start: 2025-08-22 | End: 2025-08-22

## 2025-08-22 RX ORDER — SODIUM CHLORIDE, SODIUM LACTATE, POTASSIUM CHLORIDE, CALCIUM CHLORIDE 600; 310; 30; 20 MG/100ML; MG/100ML; MG/100ML; MG/100ML
100 INJECTION, SOLUTION INTRAVENOUS CONTINUOUS
Status: DISCONTINUED | OUTPATIENT
Start: 2025-08-22 | End: 2025-08-23

## 2025-08-22 RX ORDER — BISACODYL 10 MG
10 SUPPOSITORY, RECTAL RECTAL DAILY PRN
Status: DISCONTINUED | OUTPATIENT
Start: 2025-08-22 | End: 2025-08-25 | Stop reason: HOSPADM

## 2025-08-22 RX ADMIN — SODIUM CHLORIDE, POTASSIUM CHLORIDE, SODIUM LACTATE AND CALCIUM CHLORIDE 100 ML/HR: 600; 310; 30; 20 INJECTION, SOLUTION INTRAVENOUS at 19:02

## 2025-08-23 PROBLEM — N18.31 STAGE 3A CHRONIC KIDNEY DISEASE: Status: ACTIVE | Noted: 2025-08-23

## 2025-08-23 LAB
ALBUMIN SERPL-MCNC: 3.8 G/DL (ref 3.5–5.2)
ALBUMIN/GLOB SERPL: 1.5 G/DL
ALP SERPL-CCNC: 42 U/L (ref 39–117)
ALT SERPL W P-5'-P-CCNC: 7 U/L (ref 1–33)
ANION GAP SERPL CALCULATED.3IONS-SCNC: 13.6 MMOL/L (ref 5–15)
AST SERPL-CCNC: 22 U/L (ref 1–32)
BASOPHILS # BLD AUTO: 0.04 10*3/MM3 (ref 0–0.2)
BASOPHILS NFR BLD AUTO: 0.4 % (ref 0–1.5)
BILIRUB SERPL-MCNC: 0.8 MG/DL (ref 0–1.2)
BUN SERPL-MCNC: 17.9 MG/DL (ref 8–23)
BUN/CREAT SERPL: 19 (ref 7–25)
CALCIUM SPEC-SCNC: 10.4 MG/DL (ref 8.6–10.5)
CHLORIDE SERPL-SCNC: 107 MMOL/L (ref 98–107)
CO2 SERPL-SCNC: 19.4 MMOL/L (ref 22–29)
CREAT SERPL-MCNC: 0.94 MG/DL (ref 0.57–1)
DEPRECATED RDW RBC AUTO: 45.1 FL (ref 37–54)
EGFRCR SERPLBLD CKD-EPI 2021: 58.5 ML/MIN/1.73
EOSINOPHIL # BLD AUTO: 0.06 10*3/MM3 (ref 0–0.4)
EOSINOPHIL NFR BLD AUTO: 0.6 % (ref 0.3–6.2)
ERYTHROCYTE [DISTWIDTH] IN BLOOD BY AUTOMATED COUNT: 13.2 % (ref 12.3–15.4)
GLOBULIN UR ELPH-MCNC: 2.6 GM/DL
GLUCOSE SERPL-MCNC: 95 MG/DL (ref 65–99)
HCT VFR BLD AUTO: 43.3 % (ref 34–46.6)
HGB BLD-MCNC: 13.7 G/DL (ref 12–15.9)
IMM GRANULOCYTES # BLD AUTO: 0.04 10*3/MM3 (ref 0–0.05)
IMM GRANULOCYTES NFR BLD AUTO: 0.4 % (ref 0–0.5)
LARGE PLATELETS: NORMAL
LYMPHOCYTES # BLD AUTO: 1.59 10*3/MM3 (ref 0.7–3.1)
LYMPHOCYTES NFR BLD AUTO: 16.8 % (ref 19.6–45.3)
MAGNESIUM SERPL-MCNC: 2.4 MG/DL (ref 1.6–2.4)
MCH RBC QN AUTO: 29.7 PG (ref 26.6–33)
MCHC RBC AUTO-ENTMCNC: 31.6 G/DL (ref 31.5–35.7)
MCV RBC AUTO: 93.7 FL (ref 79–97)
MONOCYTES # BLD AUTO: 0.76 10*3/MM3 (ref 0.1–0.9)
MONOCYTES NFR BLD AUTO: 8 % (ref 5–12)
NEUTROPHILS NFR BLD AUTO: 6.98 10*3/MM3 (ref 1.7–7)
NEUTROPHILS NFR BLD AUTO: 73.8 % (ref 42.7–76)
NRBC BLD AUTO-RTO: 0 /100 WBC (ref 0–0.2)
PHOSPHATE SERPL-MCNC: 2.1 MG/DL (ref 2.5–4.5)
PHOSPHATE SERPL-MCNC: 2.7 MG/DL (ref 2.5–4.5)
PLATELET # BLD AUTO: 185 10*3/MM3 (ref 140–450)
PMV BLD AUTO: 13.2 FL (ref 6–12)
POTASSIUM SERPL-SCNC: 4.3 MMOL/L (ref 3.5–5.2)
PROCALCITONIN SERPL-MCNC: 0.05 NG/ML (ref 0–0.25)
PROT SERPL-MCNC: 6.4 G/DL (ref 6–8.5)
QT INTERVAL: 477 MS
QTC INTERVAL: 477 MS
RBC # BLD AUTO: 4.62 10*6/MM3 (ref 3.77–5.28)
RBC MORPH BLD: NORMAL
SODIUM SERPL-SCNC: 140 MMOL/L (ref 136–145)
T4 FREE SERPL-MCNC: 1.69 NG/DL (ref 0.92–1.68)
TSH SERPL DL<=0.05 MIU/L-ACNC: 0.72 UIU/ML (ref 0.27–4.2)
WBC MORPH BLD: NORMAL
WBC NRBC COR # BLD AUTO: 9.47 10*3/MM3 (ref 3.4–10.8)

## 2025-08-23 PROCEDURE — 80053 COMPREHEN METABOLIC PANEL: CPT | Performed by: STUDENT IN AN ORGANIZED HEALTH CARE EDUCATION/TRAINING PROGRAM

## 2025-08-23 PROCEDURE — 96361 HYDRATE IV INFUSION ADD-ON: CPT

## 2025-08-23 PROCEDURE — 96375 TX/PRO/DX INJ NEW DRUG ADDON: CPT

## 2025-08-23 PROCEDURE — G0378 HOSPITAL OBSERVATION PER HR: HCPCS

## 2025-08-23 PROCEDURE — 85007 BL SMEAR W/DIFF WBC COUNT: CPT | Performed by: STUDENT IN AN ORGANIZED HEALTH CARE EDUCATION/TRAINING PROGRAM

## 2025-08-23 PROCEDURE — 96365 THER/PROPH/DIAG IV INF INIT: CPT

## 2025-08-23 PROCEDURE — 84100 ASSAY OF PHOSPHORUS: CPT | Performed by: STUDENT IN AN ORGANIZED HEALTH CARE EDUCATION/TRAINING PROGRAM

## 2025-08-23 PROCEDURE — 96366 THER/PROPH/DIAG IV INF ADDON: CPT

## 2025-08-23 PROCEDURE — 84100 ASSAY OF PHOSPHORUS: CPT

## 2025-08-23 PROCEDURE — 25810000003 SODIUM CHLORIDE 0.9 % SOLUTION

## 2025-08-23 PROCEDURE — 83735 ASSAY OF MAGNESIUM: CPT | Performed by: STUDENT IN AN ORGANIZED HEALTH CARE EDUCATION/TRAINING PROGRAM

## 2025-08-23 PROCEDURE — 85025 COMPLETE CBC W/AUTO DIFF WBC: CPT | Performed by: STUDENT IN AN ORGANIZED HEALTH CARE EDUCATION/TRAINING PROGRAM

## 2025-08-23 PROCEDURE — 25010000002 HYDRALAZINE PER 20 MG: Performed by: STUDENT IN AN ORGANIZED HEALTH CARE EDUCATION/TRAINING PROGRAM

## 2025-08-23 RX ORDER — LOSARTAN POTASSIUM 50 MG/1
50 TABLET ORAL
Status: DISCONTINUED | OUTPATIENT
Start: 2025-08-24 | End: 2025-08-25 | Stop reason: HOSPADM

## 2025-08-23 RX ORDER — LEVOTHYROXINE SODIUM 88 UG/1
88 TABLET ORAL
Status: DISCONTINUED | OUTPATIENT
Start: 2025-08-23 | End: 2025-08-25 | Stop reason: HOSPADM

## 2025-08-23 RX ORDER — VALSARTAN 80 MG/1
80 TABLET ORAL
Status: DISCONTINUED | OUTPATIENT
Start: 2025-08-24 | End: 2025-08-23

## 2025-08-23 RX ORDER — HYDRALAZINE HYDROCHLORIDE 20 MG/ML
10 INJECTION INTRAMUSCULAR; INTRAVENOUS ONCE
Status: COMPLETED | OUTPATIENT
Start: 2025-08-23 | End: 2025-08-23

## 2025-08-23 RX ORDER — MEMANTINE HYDROCHLORIDE 10 MG/1
10 TABLET ORAL 2 TIMES DAILY
Status: DISCONTINUED | OUTPATIENT
Start: 2025-08-23 | End: 2025-08-25 | Stop reason: HOSPADM

## 2025-08-23 RX ORDER — POTASSIUM CHLORIDE 1500 MG/1
40 TABLET, EXTENDED RELEASE ORAL EVERY 4 HOURS
Status: COMPLETED | OUTPATIENT
Start: 2025-08-23 | End: 2025-08-23

## 2025-08-23 RX ORDER — LOSARTAN POTASSIUM 50 MG/1
50 TABLET ORAL
Status: DISCONTINUED | OUTPATIENT
Start: 2025-08-23 | End: 2025-08-23

## 2025-08-23 RX ORDER — ATORVASTATIN CALCIUM 10 MG/1
10 TABLET, FILM COATED ORAL DAILY
Status: DISCONTINUED | OUTPATIENT
Start: 2025-08-23 | End: 2025-08-25 | Stop reason: HOSPADM

## 2025-08-23 RX ADMIN — ATORVASTATIN CALCIUM 10 MG: 10 TABLET ORAL at 09:03

## 2025-08-23 RX ADMIN — APIXABAN 2.5 MG: 2.5 TABLET, FILM COATED ORAL at 09:03

## 2025-08-23 RX ADMIN — Medication 10 ML: at 02:18

## 2025-08-23 RX ADMIN — LOSARTAN POTASSIUM 50 MG: 50 TABLET, FILM COATED ORAL at 04:03

## 2025-08-23 RX ADMIN — MEMANTINE 10 MG: 10 TABLET ORAL at 20:49

## 2025-08-23 RX ADMIN — POTASSIUM CHLORIDE 40 MEQ: 1500 TABLET, EXTENDED RELEASE ORAL at 09:03

## 2025-08-23 RX ADMIN — LEVOTHYROXINE SODIUM 88 MCG: 88 TABLET ORAL at 06:08

## 2025-08-23 RX ADMIN — POTASSIUM CHLORIDE 40 MEQ: 1500 TABLET, EXTENDED RELEASE ORAL at 02:07

## 2025-08-23 RX ADMIN — Medication 10 ML: at 20:49

## 2025-08-23 RX ADMIN — MEMANTINE 10 MG: 10 TABLET ORAL at 09:03

## 2025-08-23 RX ADMIN — APIXABAN 2.5 MG: 2.5 TABLET, FILM COATED ORAL at 20:49

## 2025-08-23 RX ADMIN — HYDRALAZINE HYDROCHLORIDE 10 MG: 20 INJECTION INTRAMUSCULAR; INTRAVENOUS at 02:07

## 2025-08-23 RX ADMIN — Medication 10 ML: at 09:03

## 2025-08-24 PROCEDURE — 96375 TX/PRO/DX INJ NEW DRUG ADDON: CPT

## 2025-08-24 PROCEDURE — 97166 OT EVAL MOD COMPLEX 45 MIN: CPT | Performed by: OCCUPATIONAL THERAPIST

## 2025-08-24 PROCEDURE — 25010000002 ONDANSETRON PER 1 MG

## 2025-08-24 PROCEDURE — 25010000002 HYDRALAZINE PER 20 MG

## 2025-08-24 PROCEDURE — 97161 PT EVAL LOW COMPLEX 20 MIN: CPT

## 2025-08-24 PROCEDURE — G0378 HOSPITAL OBSERVATION PER HR: HCPCS

## 2025-08-24 PROCEDURE — 25010000002 KETOROLAC TROMETHAMINE PER 15 MG

## 2025-08-24 PROCEDURE — 96376 TX/PRO/DX INJ SAME DRUG ADON: CPT

## 2025-08-24 RX ORDER — HYDROCHLOROTHIAZIDE 12.5 MG/1
12.5 TABLET ORAL DAILY
Status: DISCONTINUED | OUTPATIENT
Start: 2025-08-24 | End: 2025-08-25 | Stop reason: HOSPADM

## 2025-08-24 RX ORDER — KETOROLAC TROMETHAMINE 30 MG/ML
15 INJECTION, SOLUTION INTRAMUSCULAR; INTRAVENOUS ONCE
Status: COMPLETED | OUTPATIENT
Start: 2025-08-24 | End: 2025-08-24

## 2025-08-24 RX ORDER — HYDRALAZINE HYDROCHLORIDE 20 MG/ML
20 INJECTION INTRAMUSCULAR; INTRAVENOUS EVERY 6 HOURS PRN
Status: DISCONTINUED | OUTPATIENT
Start: 2025-08-24 | End: 2025-08-24

## 2025-08-24 RX ORDER — HYDRALAZINE HYDROCHLORIDE 20 MG/ML
10 INJECTION INTRAMUSCULAR; INTRAVENOUS EVERY 6 HOURS PRN
Status: DISCONTINUED | OUTPATIENT
Start: 2025-08-24 | End: 2025-08-25 | Stop reason: HOSPADM

## 2025-08-24 RX ORDER — AMLODIPINE BESYLATE 5 MG/1
10 TABLET ORAL
Status: DISCONTINUED | OUTPATIENT
Start: 2025-08-24 | End: 2025-08-25 | Stop reason: HOSPADM

## 2025-08-24 RX ORDER — ONDANSETRON 2 MG/ML
4 INJECTION INTRAMUSCULAR; INTRAVENOUS EVERY 6 HOURS PRN
Status: DISCONTINUED | OUTPATIENT
Start: 2025-08-24 | End: 2025-08-25 | Stop reason: HOSPADM

## 2025-08-24 RX ORDER — ACETAMINOPHEN 325 MG/1
650 TABLET ORAL EVERY 6 HOURS PRN
Status: DISCONTINUED | OUTPATIENT
Start: 2025-08-24 | End: 2025-08-25 | Stop reason: HOSPADM

## 2025-08-24 RX ADMIN — LOSARTAN POTASSIUM 50 MG: 50 TABLET, FILM COATED ORAL at 06:01

## 2025-08-24 RX ADMIN — AMLODIPINE BESYLATE 10 MG: 5 TABLET ORAL at 16:42

## 2025-08-24 RX ADMIN — MEMANTINE 10 MG: 10 TABLET ORAL at 08:32

## 2025-08-24 RX ADMIN — Medication 10 ML: at 08:32

## 2025-08-24 RX ADMIN — HYDRALAZINE HYDROCHLORIDE 10 MG: 20 INJECTION INTRAMUSCULAR; INTRAVENOUS at 16:05

## 2025-08-24 RX ADMIN — HYDRALAZINE HYDROCHLORIDE 10 MG: 20 INJECTION INTRAMUSCULAR; INTRAVENOUS at 07:52

## 2025-08-24 RX ADMIN — MEMANTINE 10 MG: 10 TABLET ORAL at 20:13

## 2025-08-24 RX ADMIN — Medication 10 ML: at 20:14

## 2025-08-24 RX ADMIN — ATORVASTATIN CALCIUM 10 MG: 10 TABLET ORAL at 08:32

## 2025-08-24 RX ADMIN — APIXABAN 2.5 MG: 2.5 TABLET, FILM COATED ORAL at 20:13

## 2025-08-24 RX ADMIN — ONDANSETRON 4 MG: 2 INJECTION, SOLUTION INTRAMUSCULAR; INTRAVENOUS at 17:18

## 2025-08-24 RX ADMIN — HYDROCHLOROTHIAZIDE 12.5 MG: 12.5 TABLET ORAL at 13:04

## 2025-08-24 RX ADMIN — APIXABAN 2.5 MG: 2.5 TABLET, FILM COATED ORAL at 08:32

## 2025-08-24 RX ADMIN — LEVOTHYROXINE SODIUM 88 MCG: 88 TABLET ORAL at 05:49

## 2025-08-24 RX ADMIN — KETOROLAC TROMETHAMINE 15 MG: 30 INJECTION INTRAMUSCULAR; INTRAVENOUS at 16:41

## 2025-08-24 RX ADMIN — ACETAMINOPHEN 650 MG: 500 TABLET, FILM COATED ORAL at 11:17

## 2025-08-25 VITALS
RESPIRATION RATE: 24 BRPM | TEMPERATURE: 98.3 F | WEIGHT: 132.28 LBS | SYSTOLIC BLOOD PRESSURE: 134 MMHG | BODY MASS INDEX: 21.26 KG/M2 | OXYGEN SATURATION: 94 % | HEART RATE: 68 BPM | DIASTOLIC BLOOD PRESSURE: 77 MMHG | HEIGHT: 66 IN

## 2025-08-25 PROCEDURE — G0378 HOSPITAL OBSERVATION PER HR: HCPCS

## 2025-08-25 RX ORDER — AMLODIPINE BESYLATE 10 MG/1
10 TABLET ORAL
Qty: 30 TABLET | Refills: 0 | Status: SHIPPED | OUTPATIENT
Start: 2025-08-26

## 2025-08-25 RX ORDER — LOSARTAN POTASSIUM 25 MG/1
25 TABLET ORAL 2 TIMES DAILY
Qty: 30 TABLET | Refills: 0 | Status: SHIPPED | OUTPATIENT
Start: 2025-08-25

## 2025-08-25 RX ADMIN — APIXABAN 2.5 MG: 2.5 TABLET, FILM COATED ORAL at 08:53

## 2025-08-25 RX ADMIN — AMLODIPINE BESYLATE 10 MG: 5 TABLET ORAL at 08:52

## 2025-08-25 RX ADMIN — HYDROCHLOROTHIAZIDE 12.5 MG: 12.5 TABLET ORAL at 08:53

## 2025-08-25 RX ADMIN — LOSARTAN POTASSIUM 50 MG: 50 TABLET, FILM COATED ORAL at 08:53

## 2025-08-25 RX ADMIN — ATORVASTATIN CALCIUM 10 MG: 10 TABLET ORAL at 08:53

## 2025-08-25 RX ADMIN — MEMANTINE 10 MG: 10 TABLET ORAL at 08:53

## 2025-08-25 RX ADMIN — Medication 10 ML: at 09:43

## 2025-08-25 RX ADMIN — LEVOTHYROXINE SODIUM 88 MCG: 88 TABLET ORAL at 05:32

## (undated) DEVICE — ADHS LIQ MASTISOL 2/3ML

## (undated) DEVICE — PACEMAKER CDS: Brand: MEDLINE INDUSTRIES, INC.

## (undated) DEVICE — INTRO SHEATH PRELUDE SNAP .038 6F 13CM W/SDPRT

## (undated) DEVICE — 3M™ STERI-STRIP™ BLEND TONE SKIN CLOSURES, B1557, TAN, 1/2 IN X 4 IN (12MM X 100MM), 6 STRIPS/ENVELOPE: Brand: 3M™ STERI-STRIP™

## (undated) DEVICE — VIOLET BRAIDED (POLYGLACTIN 910), SYNTHETIC ABSORBABLE SUTURE: Brand: COATED VICRYL

## (undated) DEVICE — IMMOB SHLDR CUT/AWAY UNIV

## (undated) DEVICE — UNDYED BRAIDED (POLYGLACTIN 910), SYNTHETIC ABSORBABLE SUTURE: Brand: COATED VICRYL

## (undated) DEVICE — SUT SILK 2/0 FS BLK 18IN 685G

## (undated) DEVICE — TBG PENCL TELESCP MEGADYNE SMOKE EVAC 10FT

## (undated) DEVICE — SUT ETHIB 0/0 MO6 I8IN CX45D

## (undated) DEVICE — CABL BIPOL W/ALLGTR CLIP/SM 12FT

## (undated) DEVICE — Device

## (undated) DEVICE — ANTIBACTERIAL UNDYED BRAIDED (POLYGLACTIN 910), SYNTHETIC ABSORBABLE SUTURE: Brand: COATED VICRYL

## (undated) DEVICE — 3M™ PATIENT PLATE, CORDED, SPLIT, LARGE, 40 PER CASE, 1179: Brand: 3M™

## (undated) DEVICE — DRSNG WND BORDR/ADHS NONADHR/GZ LF 4X4IN STRL

## (undated) DEVICE — 3M™ IOBAN™ 2 ANTIMICROBIAL INCISE DRAPE 6650EZ: Brand: IOBAN™ 2

## (undated) DEVICE — ST ACC MICROPUNCTURE STFF/CANN PLAT/TP 4F 21G 40CM

## (undated) DEVICE — ELECTRD DEFIB M/FUNC PROPADZ RADIOL 2PK